# Patient Record
Sex: MALE | Race: WHITE | NOT HISPANIC OR LATINO | URBAN - METROPOLITAN AREA
[De-identification: names, ages, dates, MRNs, and addresses within clinical notes are randomized per-mention and may not be internally consistent; named-entity substitution may affect disease eponyms.]

---

## 2023-09-06 ENCOUNTER — OFFICE (OUTPATIENT)
Dept: URBAN - METROPOLITAN AREA CLINIC 18 | Facility: CLINIC | Age: 68
End: 2023-09-06

## 2023-09-06 ENCOUNTER — HOSPITAL ENCOUNTER (OUTPATIENT)
Dept: DATA CONVERSION | Facility: HOSPITAL | Age: 68
Discharge: HOME | End: 2023-09-07
Payer: MEDICAID

## 2023-09-06 VITALS
HEART RATE: 44 BPM | SYSTOLIC BLOOD PRESSURE: 134 MMHG | HEIGHT: 72 IN | WEIGHT: 315 LBS | DIASTOLIC BLOOD PRESSURE: 86 MMHG

## 2023-09-06 DIAGNOSIS — K80.20 CALCULUS OF GALLBLADDER WITHOUT CHOLECYSTITIS WITHOUT OBSTRU: ICD-10-CM

## 2023-09-06 DIAGNOSIS — M10.9 GOUT, UNSPECIFIED: ICD-10-CM

## 2023-09-06 DIAGNOSIS — Z87.891 PERSONAL HISTORY OF NICOTINE DEPENDENCE: ICD-10-CM

## 2023-09-06 DIAGNOSIS — L03.115 CELLULITIS OF RIGHT LOWER LIMB: ICD-10-CM

## 2023-09-06 DIAGNOSIS — R60.0 LOCALIZED EDEMA: ICD-10-CM

## 2023-09-06 DIAGNOSIS — K83.8 OTHER SPECIFIED DISEASES OF BILIARY TRACT: ICD-10-CM

## 2023-09-06 DIAGNOSIS — E80.6 OTHER DISORDERS OF BILIRUBIN METABOLISM: ICD-10-CM

## 2023-09-06 DIAGNOSIS — Z79.899 OTHER LONG TERM (CURRENT) DRUG THERAPY: ICD-10-CM

## 2023-09-06 DIAGNOSIS — E66.01 MORBID (SEVERE) OBESITY DUE TO EXCESS CALORIES: ICD-10-CM

## 2023-09-06 LAB
ALBUMIN SERPL-MCNC: 3.8 GM/DL (ref 3.5–5)
ALBUMIN/GLOB SERPL: 1.1 RATIO (ref 1.5–3)
ALP BLD-CCNC: 105 U/L (ref 35–125)
ALT SERPL-CCNC: 31 U/L (ref 5–40)
ANION GAP SERPL CALCULATED.3IONS-SCNC: 12 MMOL/L (ref 0–19)
AST SERPL-CCNC: 48 U/L (ref 5–40)
BACTERIA UR QL AUTO: NEGATIVE
BASOPHILS # BLD AUTO: 0.04 K/UL (ref 0–0.22)
BASOPHILS NFR BLD AUTO: 0.4 % (ref 0–1)
BILIRUB SERPL-MCNC: 2.2 MG/DL (ref 0.1–1.2)
BILIRUB UR QL STRIP.AUTO: NEGATIVE
BLOOD CULTURE RESULTS -LH SQ DATA CONVERSION: NORMAL
BLOOD CULTURE RESULTS -LH SQ DATA CONVERSION: NORMAL
BUN SERPL-MCNC: 12 MG/DL (ref 8–25)
BUN/CREAT SERPL: 13.3 RATIO (ref 8–21)
CALCIUM SERPL-MCNC: 9 MG/DL (ref 8.5–10.4)
CHLORIDE SERPL-SCNC: 99 MMOL/L (ref 97–107)
CLARITY UR: ABNORMAL
CO2 SERPL-SCNC: 22 MMOL/L (ref 24–31)
COLOR UR: YELLOW
CREAT SERPL-MCNC: 0.9 MG/DL (ref 0.4–1.6)
CRP SERPL-MCNC: 6.4 MG/DL (ref 0–2)
DEPRECATED RDW RBC AUTO: 57.7 FL (ref 37–54)
DIFFERENTIAL METHOD BLD: ABNORMAL
EOSINOPHIL # BLD AUTO: 0.01 K/UL (ref 0–0.45)
EOSINOPHIL NFR BLD: 0.1 % (ref 0–3)
ERYTHROCYTE [DISTWIDTH] IN BLOOD BY AUTOMATED COUNT: 14.2 % (ref 11.7–15)
ERYTHROCYTE [SEDIMENTATION RATE] IN BLOOD BY WESTERGREN METHOD: 25 MM/HR (ref 0–20)
GFR SERPL CREATININE-BSD FRML MDRD: 94 ML/MIN/1.73 M2
GLOBULIN SER-MCNC: 3.5 G/DL (ref 1.9–3.7)
GLUCOSE SERPL-MCNC: 133 MG/DL (ref 65–99)
GLUCOSE UR STRIP.AUTO-MCNC: NEGATIVE MG/DL
HCT VFR BLD AUTO: 35.9 % (ref 41–50)
HGB BLD-MCNC: 12.7 GM/DL (ref 13.5–16.5)
HGB UR QL STRIP.AUTO: 2 /HPF (ref 0–3)
HGB UR QL: NEGATIVE
HYALINE CASTS UR QL AUTO: 3 /LPF
IMM GRANULOCYTES # BLD AUTO: 0.04 K/UL (ref 0–0.1)
KETONES UR QL STRIP.AUTO: NEGATIVE
LACTATE BLDV-SCNC: 2.5 MMOL/L (ref 0.4–2)
LACTATE BLDV-SCNC: 3.6 MMOL/L (ref 0.4–2)
LEUKOCYTE ESTERASE UR QL STRIP.AUTO: NEGATIVE
LYMPHOCYTES # BLD AUTO: 0.88 K/UL (ref 1.2–3.2)
LYMPHOCYTES NFR BLD MANUAL: 9.6 % (ref 20–40)
MCH RBC QN AUTO: 38.7 PG (ref 26–34)
MCHC RBC AUTO-ENTMCNC: 35.4 % (ref 31–37)
MCV RBC AUTO: 109.5 FL (ref 80–100)
MICROSCOPIC (UA): ABNORMAL
MONOCYTES # BLD AUTO: 1.29 K/UL (ref 0–0.8)
MONOCYTES NFR BLD MANUAL: 14 % (ref 0–8)
NEUTROPHILS # BLD AUTO: 6.94 K/UL
NEUTROPHILS # BLD AUTO: 6.94 K/UL (ref 1.8–7.7)
NEUTROPHILS.IMMATURE NFR BLD: 0.4 % (ref 0–1)
NEUTS SEG NFR BLD: 75.5 % (ref 50–70)
NITRITE UR QL STRIP.AUTO: NEGATIVE
NRBC BLD-RTO: 0 /100 WBC
PH UR STRIP.AUTO: 5.5 [PH] (ref 4.6–8)
PLATELET # BLD AUTO: 200 K/UL (ref 150–450)
PMV BLD AUTO: 9.4 CU (ref 7–12.6)
POTASSIUM SERPL-SCNC: 3.7 MMOL/L (ref 3.4–5.1)
PROT SERPL-MCNC: 7.3 G/DL (ref 5.9–7.9)
PROT UR STRIP.AUTO-MCNC: ABNORMAL MG/DL
RBC # BLD AUTO: 3.28 M/UL (ref 4.5–5.5)
SODIUM SERPL-SCNC: 133 MMOL/L (ref 133–145)
SOURCE,MICRO -LH SQ DATA CONVERSION: NORMAL
SOURCE,MICRO -LH SQ DATA CONVERSION: NORMAL
SP GR UR STRIP.AUTO: 1.03 (ref 1–1.03)
SQUAMOUS UR QL AUTO: ABNORMAL /HPF
URATE SERPL-MCNC: 8.1 MG/DL (ref 3.6–7.7)
URINE CULTURE: ABNORMAL
UROBILINOGEN UR QL STRIP.AUTO: NORMAL MG/DL (ref 0–1)
WBC # BLD AUTO: 9.2 K/UL (ref 4.5–11)
WBC #/AREA URNS AUTO: 2 /HPF (ref 0–3)

## 2023-09-06 PROCEDURE — 99204 OFFICE O/P NEW MOD 45 MIN: CPT | Performed by: INTERNAL MEDICINE

## 2023-09-07 LAB
ALBUMIN SERPL-MCNC: 2.9 GM/DL (ref 3.5–5)
ALBUMIN/GLOB SERPL: 1 RATIO (ref 1.5–3)
ALP BLD-CCNC: 86 U/L (ref 35–125)
ALT SERPL-CCNC: 20 U/L (ref 5–40)
ANION GAP SERPL CALCULATED.3IONS-SCNC: 11 MMOL/L (ref 0–19)
ANISOCYTOSIS BLD QL SMEAR: ABNORMAL
AST SERPL-CCNC: 29 U/L (ref 5–40)
BASOPHILS # BLD AUTO: 0 K/UL (ref 0–0.22)
BASOPHILS NFR BLD AUTO: 0 % (ref 0–1)
BILIRUB SERPL-MCNC: 0.9 MG/DL (ref 0.1–1.2)
BUN SERPL-MCNC: 14 MG/DL (ref 8–25)
BUN/CREAT SERPL: 17.5 RATIO (ref 8–21)
CALCIUM SERPL-MCNC: 8.3 MG/DL (ref 8.5–10.4)
CHLORIDE SERPL-SCNC: 105 MMOL/L (ref 97–107)
CO2 SERPL-SCNC: 21 MMOL/L (ref 24–31)
CREAT SERPL-MCNC: 0.8 MG/DL (ref 0.4–1.6)
DACRYOCYTES BLD QL SMEAR: ABNORMAL
DEPRECATED RDW RBC AUTO: 56.8 FL (ref 37–54)
DIFFERENTIAL METHOD BLD: ABNORMAL
EOSINOPHIL # BLD AUTO: 0 K/UL (ref 0–0.45)
EOSINOPHIL NFR BLD: 0 % (ref 0–3)
ERYTHROCYTE [DISTWIDTH] IN BLOOD BY AUTOMATED COUNT: 13.7 % (ref 11.7–15)
GFR SERPL CREATININE-BSD FRML MDRD: 98 ML/MIN/1.73 M2
GLOBULIN SER-MCNC: 3 G/DL (ref 1.9–3.7)
GLUCOSE SERPL-MCNC: 186 MG/DL (ref 65–99)
HCT VFR BLD AUTO: 31.5 % (ref 41–50)
HGB BLD-MCNC: 10.6 GM/DL (ref 13.5–16.5)
IMM GRANULOCYTES # BLD AUTO: 0.05 K/UL (ref 0–0.1)
LYMPHOCYTES # BLD AUTO: 0.6 K/UL (ref 1.2–3.2)
LYMPHOCYTES NFR BLD MANUAL: 6.7 % (ref 20–40)
MACROCYTES BLD QL SMEAR: ABNORMAL
MAGNESIUM SERPL-MCNC: 2.4 MG/DL (ref 1.6–3.1)
MCH RBC QN AUTO: 37.9 PG (ref 26–34)
MCHC RBC AUTO-ENTMCNC: 33.7 % (ref 31–37)
MCV RBC AUTO: 112.5 FL (ref 80–100)
MONOCYTES # BLD AUTO: 0.34 K/UL (ref 0–0.8)
MONOCYTES NFR BLD MANUAL: 3.8 % (ref 0–8)
NEUTROPHILS # BLD AUTO: 7.97 K/UL
NEUTROPHILS # BLD AUTO: 7.97 K/UL (ref 1.8–7.7)
NEUTROPHILS.IMMATURE NFR BLD: 0.6 % (ref 0–1)
NEUTS SEG NFR BLD: 88.9 % (ref 50–70)
NRBC BLD-RTO: 0 /100 WBC
PHOSPHATE SERPL-MCNC: 1.8 MG/DL (ref 2.5–4.5)
PLATELET # BLD AUTO: 159 K/UL (ref 150–450)
PLATELET BLD QL SMEAR: ABNORMAL
PMV BLD AUTO: 10.3 CU (ref 7–12.6)
POIKILOCYTOSIS BLD QL SMEAR: ABNORMAL
POLYCHROMASIA BLD QL SMEAR: ABNORMAL
POTASSIUM SERPL-SCNC: 3.9 MMOL/L (ref 3.4–5.1)
PROT SERPL-MCNC: 5.9 G/DL (ref 5.9–7.9)
RBC # BLD AUTO: 2.8 M/UL (ref 4.5–5.5)
RBC MORPH BLD: ABNORMAL
SODIUM SERPL-SCNC: 137 MMOL/L (ref 133–145)
URATE SERPL-MCNC: 4.8 MG/DL (ref 3.6–7.7)
WBC # BLD AUTO: 9 K/UL (ref 4.5–11)
WBC MORPH BLD: ABNORMAL

## 2023-10-11 ENCOUNTER — APPOINTMENT (OUTPATIENT)
Dept: RADIOLOGY | Facility: HOSPITAL | Age: 68
End: 2023-10-11
Payer: MEDICAID

## 2023-10-11 ENCOUNTER — HOSPITAL ENCOUNTER (EMERGENCY)
Facility: HOSPITAL | Age: 68
Discharge: HOME | End: 2023-10-11
Attending: EMERGENCY MEDICINE
Payer: MEDICAID

## 2023-10-11 VITALS
WEIGHT: 170 LBS | BODY MASS INDEX: 25.18 KG/M2 | HEART RATE: 88 BPM | TEMPERATURE: 99.3 F | SYSTOLIC BLOOD PRESSURE: 118 MMHG | RESPIRATION RATE: 16 BRPM | HEIGHT: 69 IN | DIASTOLIC BLOOD PRESSURE: 78 MMHG | OXYGEN SATURATION: 98 %

## 2023-10-11 DIAGNOSIS — M10.9 ACUTE GOUT OF RIGHT FOOT, UNSPECIFIED CAUSE: Primary | ICD-10-CM

## 2023-10-11 LAB
ANION GAP SERPL CALC-SCNC: 12 MMOL/L
BASOPHILS # BLD AUTO: 0.04 X10*3/UL (ref 0–0.1)
BASOPHILS NFR BLD AUTO: 0.5 %
BUN SERPL-MCNC: 11 MG/DL (ref 8–25)
CALCIUM SERPL-MCNC: 9.2 MG/DL (ref 8.5–10.4)
CHLORIDE SERPL-SCNC: 101 MMOL/L (ref 97–107)
CO2 SERPL-SCNC: 24 MMOL/L (ref 24–31)
CREAT SERPL-MCNC: 0.9 MG/DL (ref 0.4–1.6)
EOSINOPHIL # BLD AUTO: 0.1 X10*3/UL (ref 0–0.7)
EOSINOPHIL NFR BLD AUTO: 1.2 %
ERYTHROCYTE [DISTWIDTH] IN BLOOD BY AUTOMATED COUNT: 12.6 % (ref 11.5–14.5)
GFR SERPL CREATININE-BSD FRML MDRD: >90 ML/MIN/1.73M*2
GLUCOSE SERPL-MCNC: 100 MG/DL (ref 65–99)
HCT VFR BLD AUTO: 34 % (ref 41–52)
HGB BLD-MCNC: 11.4 G/DL (ref 13.5–17.5)
IMM GRANULOCYTES # BLD AUTO: 0.03 X10*3/UL (ref 0–0.7)
IMM GRANULOCYTES NFR BLD AUTO: 0.4 % (ref 0–0.9)
LYMPHOCYTES # BLD AUTO: 0.97 X10*3/UL (ref 1.2–4.8)
LYMPHOCYTES NFR BLD AUTO: 11.8 %
MCH RBC QN AUTO: 36.8 PG (ref 26–34)
MCHC RBC AUTO-ENTMCNC: 33.5 G/DL (ref 32–36)
MCV RBC AUTO: 110 FL (ref 80–100)
MONOCYTES # BLD AUTO: 0.86 X10*3/UL (ref 0.1–1)
MONOCYTES NFR BLD AUTO: 10.4 %
NEUTROPHILS # BLD AUTO: 6.24 X10*3/UL (ref 1.2–7.7)
NEUTROPHILS NFR BLD AUTO: 75.7 %
NRBC BLD-RTO: 0 /100 WBCS (ref 0–0)
PLATELET # BLD AUTO: 219 X10*3/UL (ref 150–450)
PMV BLD AUTO: 8.7 FL (ref 7.5–11.5)
POTASSIUM SERPL-SCNC: 3.7 MMOL/L (ref 3.4–5.1)
RBC # BLD AUTO: 3.1 X10*6/UL (ref 4.5–5.9)
SODIUM SERPL-SCNC: 137 MMOL/L (ref 133–145)
URATE SERPL-MCNC: 3.9 MG/DL (ref 3.6–7.7)
WBC # BLD AUTO: 8.2 X10*3/UL (ref 4.4–11.3)

## 2023-10-11 PROCEDURE — 36415 COLL VENOUS BLD VENIPUNCTURE: CPT | Performed by: EMERGENCY MEDICINE

## 2023-10-11 PROCEDURE — 84550 ASSAY OF BLOOD/URIC ACID: CPT | Performed by: EMERGENCY MEDICINE

## 2023-10-11 PROCEDURE — 73630 X-RAY EXAM OF FOOT: CPT | Mod: RT,FY

## 2023-10-11 PROCEDURE — 80048 BASIC METABOLIC PNL TOTAL CA: CPT | Performed by: EMERGENCY MEDICINE

## 2023-10-11 PROCEDURE — 2500000004 HC RX 250 GENERAL PHARMACY W/ HCPCS (ALT 636 FOR OP/ED): Performed by: EMERGENCY MEDICINE

## 2023-10-11 PROCEDURE — 99283 EMERGENCY DEPT VISIT LOW MDM: CPT

## 2023-10-11 PROCEDURE — 99284 EMERGENCY DEPT VISIT MOD MDM: CPT | Performed by: EMERGENCY MEDICINE

## 2023-10-11 PROCEDURE — 85025 COMPLETE CBC W/AUTO DIFF WBC: CPT | Performed by: EMERGENCY MEDICINE

## 2023-10-11 RX ORDER — PREDNISONE 20 MG/1
40 TABLET ORAL ONCE
Status: COMPLETED | OUTPATIENT
Start: 2023-10-11 | End: 2023-10-11

## 2023-10-11 RX ORDER — PREDNISONE 20 MG/1
40 TABLET ORAL DAILY
Qty: 14 TABLET | Refills: 0 | Status: SHIPPED | OUTPATIENT
Start: 2023-10-11 | End: 2023-10-18

## 2023-10-11 RX ADMIN — PREDNISONE 40 MG: 20 TABLET ORAL at 17:39

## 2023-10-11 ASSESSMENT — PAIN SCALES - GENERAL: PAINLEVEL_OUTOF10: 10 - WORST POSSIBLE PAIN

## 2023-10-11 ASSESSMENT — PAIN - FUNCTIONAL ASSESSMENT: PAIN_FUNCTIONAL_ASSESSMENT: 0-10

## 2023-10-11 NOTE — ED PROVIDER NOTES
"Department of Emergency Medicine   ED  Provider Note  Admit Date/RoomTime: 10/11/2023  5:13 PM  ED Room: ST28/ST28        History of Present Illness:  Chief Complaint   Patient presents with    Foot Pain/Gout    Leg Pain     Pt states \"I have gout attack and lately my foot has been getting more painful and swollen and red. I was at the hospital 6 weeks ago and they put me on allopurinol 100. I went to see my doctor on Monday sept 25th and she bumped me up to 300 twice a day. On Thursday it seemed to be getting worse and more painful. Yesterday I stopped taking the allopurinol because it seems like it is not helping.\" Pt denies CP/SOB/N/V/fever/chills         Jake Banerjee is a 68 y.o. male presenting to the ED for gout attack, beginning a few days ago.  The complaint has been persistent, moderate in severity, and worsened by nothing.  Patient says that he was in the hospital 6 weeks ago for gout attack.  They put him on allopurinol 100 mg.  He went to see his physician on September 25 and the increase allopurinol to 300 mg twice daily.  Patient says over the last couple of days is becoming more painful.  He stopped taking the allopurinol because he did not think it was helping.  He denies any chest pain, shortness of breath, fever, chills, nausea, vomiting, trauma, injury.      Review of Systems:   Pertinent positives and negatives are stated within HPI, all other systems reviewed and are negative.        --------------------------------------------- PAST HISTORY ---------------------------------------------  Past Medical History:  has a past medical history of Foreign body in cornea, left eye, initial encounter (11/02/2017).  Past Surgical History:  has a past surgical history that includes Hernia repair (10/18/2017).  Social History:    Family History: family history is not on file.. Unless otherwise noted, family history is non contributory  The patient’s home medications have been reviewed.  Allergies: Sulfa " "(sulfonamide antibiotics)        ---------------------------------------------------PHYSICAL EXAM--------------------------------------    GENERAL APPEARANCE: Awake and alert.   VITAL SIGNS: As per the nurses' triage record.   HEENT: Normocephalic, atraumatic. Extraocular muscles are intact. Pupils equal round and reactive to light. Conjunctiva are pink. Negative scleral icterus. Mucous membranes are moist. Tongue in the midline. Pharynx was without erythema or exudates, uvula midline  NECK: Soft Nontender and supple, full gross ROM, no meningeal signs.  CHEST: Nontender to palpation. Clear to auscultation bilaterally. No rales, rhonchi, or wheezing.   HEART: S1, S2. Regular rate and rhythm. No murmurs, gallops or rubs.  Strong and equal pulses in the extremities.   ABDOMEN: Soft, nontender, nondistended, positive bowel sounds, no palpable masses.  MUSCULCSKELETAL: The calves are nontender to palpation. Full gross active range of motion. Ambulating on own with no acute difficulties.  Right foot is edematous and erythematous.  Tenderness to palpation over the foot.  2+ dorsalis pedis pulse.  NEUROLOGICAL: Awake, alert and oriented x 3. Power intact in the upper and lower extremities. Sensation is intact to light touch in the upper and lower extremities.   IMMUNOLOGICAL: No lymphatic streaking noted   DERM: No petechiae, rashes, or ecchymoses.          ------------------------- NURSING NOTES AND VITALS REVIEWED ---------------------------  The nursing notes within the ED encounter and vital signs as below have been reviewed by myself  /77 (BP Location: Right arm, Patient Position: Sitting)   Pulse 92   Temp 37.4 °C (99.3 °F) (Oral)   Resp 18   Ht 1.753 m (5' 9\")   Wt 77.1 kg (170 lb)   SpO2 100%   BMI 25.10 kg/m²     Oxygen Saturation Interpretation: Normal      The patient’s available past medical records and past encounters were reviewed.          -----------------------DIAGNOSTIC " RESULTS------------------------  LABS:    Labs Reviewed   CBC WITH AUTO DIFFERENTIAL - Abnormal       Result Value    WBC 8.2      nRBC 0.0      RBC 3.10 (*)     Hemoglobin 11.4 (*)     Hematocrit 34.0 (*)      (*)     MCH 36.8 (*)     MCHC 33.5      RDW 12.6      Platelets 219      MPV 8.7      Neutrophils % 75.7      Immature Granulocytes %, Automated 0.4      Lymphocytes % 11.8      Monocytes % 10.4      Eosinophils % 1.2      Basophils % 0.5      Neutrophils Absolute 6.24      Immature Granulocytes Absolute, Automated 0.03      Lymphocytes Absolute 0.97 (*)     Monocytes Absolute 0.86      Eosinophils Absolute 0.10      Basophils Absolute 0.04     BASIC METABOLIC PANEL - Abnormal    Glucose 100 (*)     Sodium 137      Potassium 3.7      Chloride 101      Bicarbonate 24      Urea Nitrogen 11      Creatinine 0.90      eGFR >90      Calcium 9.2      Anion Gap 12     URIC ACID - Normal    Uric Acid 3.9         As interpreted by me, the above displayed labs are abnormal. All other labs obtained during this visit were within normal range or not returned as of this dictation.        XR foot right 3+ views   Final Result   1. No acute fracture or dislocation.   2. Soft tissue swelling about the 1st IP joint.        MACRO:   None.        Signed by: Jovan Mota 10/11/2023 4:05 PM   Dictation workstation:   [id number]              XR foot right 3+ views   Final Result   1. No acute fracture or dislocation.   2. Soft tissue swelling about the 1st IP joint.        MACRO:   None.        Signed by: Jovan Mota 10/11/2023 4:05 PM   Dictation workstation:   [id number]              ------------------------------ ED COURSE/MEDICAL DECISION MAKING----------------------  Medical Decision Making:   Exam: A medically appropriate exam performed, outlined above, given the known history and presentation.    [facility]    Social Determinants of Health considered during this visit: none    PAST  MEDICAL HISTORY/Chronic Conditions Affecting Care     has a past medical history of Foreign body in cornea, left eye, initial encounter (11/02/2017).     CC/HPI Summary, Social Determinants of health, Records Reviewed, DDx, testing done/not done, ED Course, Reassessment, disposition considerations/shared decision making with patient, consults, disposition, MDM:   Erythematous and edematous right foot for the last 6 weeks.  History of gout.  Has tried colchicine and allopurinol without much relief of symptoms.  Labs obtained and are within normal limits.  X-ray was obtained.  No acute process identified.  I will start the patient on prednisone.  I told him to take the medication as directed, to follow-up with his primary care physician for reevaluation this week, and to return for any worsening symptoms.  Return precautions were discussed.  Patient is agreeable with the shared decision making at this time.    Differential Diagnosis:  Gout, cellulitis, osteomyelitis    PROCEDURES  Unless otherwise noted below, none    CONSULTS:   None    Diagnoses as of 10/11/23 1747   Acute gout of right foot, unspecified cause       This patient has remained hemodynamically stable during their ED course.    Critical Care: None    Counseling:  The emergency provider has spoken with the patient and discussed today’s results, in addition to providing specific details for the plan of care and counseling regarding the diagnosis and prognosis.  Questions are answered at this time and they are agreeable with the plan.         --------------------------------- IMPRESSION AND DISPOSITION ---------------------------------    IMPRESSION  1. Acute gout of right foot, unspecified cause        DISPOSITION  Disposition: Discharge to home  Patient condition is stable        NOTE: This report was transcribed using voice recognition software. Every effort was made to ensure accuracy; however, inadvertent computerized transcription errors may be  present       Francheska Pichardo MD  10/11/23 7953

## 2024-10-11 ENCOUNTER — PHARMACY VISIT (OUTPATIENT)
Dept: PHARMACY | Facility: CLINIC | Age: 69
End: 2024-10-11

## 2024-10-11 ENCOUNTER — HOSPITAL ENCOUNTER (EMERGENCY)
Facility: HOSPITAL | Age: 69
Discharge: HOME | End: 2024-10-11
Attending: STUDENT IN AN ORGANIZED HEALTH CARE EDUCATION/TRAINING PROGRAM
Payer: MEDICAID

## 2024-10-11 VITALS
HEIGHT: 69 IN | HEART RATE: 98 BPM | BODY MASS INDEX: 22.96 KG/M2 | DIASTOLIC BLOOD PRESSURE: 81 MMHG | SYSTOLIC BLOOD PRESSURE: 122 MMHG | TEMPERATURE: 97.7 F | WEIGHT: 155 LBS | OXYGEN SATURATION: 99 % | RESPIRATION RATE: 15 BRPM

## 2024-10-11 DIAGNOSIS — M25.562 ACUTE PAIN OF LEFT KNEE: Primary | ICD-10-CM

## 2024-10-11 DIAGNOSIS — M25.462 KNEE EFFUSION, LEFT: ICD-10-CM

## 2024-10-11 DIAGNOSIS — M10.162 LEAD-INDUCED ACUTE GOUT OF LEFT KNEE, INITIAL ENCOUNTER: ICD-10-CM

## 2024-10-11 DIAGNOSIS — T56.0X1A LEAD-INDUCED ACUTE GOUT OF LEFT KNEE, INITIAL ENCOUNTER: ICD-10-CM

## 2024-10-11 LAB
ALBUMIN SERPL BCP-MCNC: 3.7 G/DL (ref 3.4–5)
ALP SERPL-CCNC: 124 U/L (ref 33–136)
ALT SERPL W P-5'-P-CCNC: 58 U/L (ref 10–52)
ANION GAP SERPL CALCULATED.3IONS-SCNC: 16 MMOL/L (ref 10–20)
AST SERPL W P-5'-P-CCNC: 91 U/L (ref 9–39)
BASOPHILS # BLD AUTO: 0.04 X10*3/UL (ref 0–0.1)
BASOPHILS NFR BLD AUTO: 0.4 %
BASOPHILS NFR FLD MANUAL: 0 %
BILIRUB SERPL-MCNC: 3.5 MG/DL (ref 0–1.2)
BLASTS NFR FLD MANUAL: 0 %
BUN SERPL-MCNC: 13 MG/DL (ref 6–23)
CALCIUM SERPL-MCNC: 8.9 MG/DL (ref 8.6–10.3)
CHLORIDE SERPL-SCNC: 98 MMOL/L (ref 98–107)
CLARITY FLD: ABNORMAL
CO2 SERPL-SCNC: 24 MMOL/L (ref 21–32)
COLOR FLD: YELLOW
CREAT SERPL-MCNC: 0.85 MG/DL (ref 0.5–1.3)
CRYSTALS FLD MICRO: ABNORMAL
EGFRCR SERPLBLD CKD-EPI 2021: >90 ML/MIN/1.73M*2
EOSINOPHIL # BLD AUTO: 0.03 X10*3/UL (ref 0–0.7)
EOSINOPHIL NFR BLD AUTO: 0.3 %
EOSINOPHIL NFR FLD MANUAL: 0 %
ERYTHROCYTE [DISTWIDTH] IN BLOOD BY AUTOMATED COUNT: 13.5 % (ref 11.5–14.5)
GLUCOSE SERPL-MCNC: 133 MG/DL (ref 74–99)
HCT VFR BLD AUTO: 32.7 % (ref 41–52)
HGB BLD-MCNC: 11.4 G/DL (ref 13.5–17.5)
IMM GRANULOCYTES # BLD AUTO: 0.04 X10*3/UL (ref 0–0.7)
IMM GRANULOCYTES NFR BLD AUTO: 0.4 % (ref 0–0.9)
IMMATURE GRANULOCYTES IN FLUID: 0 %
LYMPHOCYTES # BLD AUTO: 1.01 X10*3/UL (ref 1.2–4.8)
LYMPHOCYTES NFR BLD AUTO: 10.3 %
LYMPHOCYTES NFR FLD MANUAL: 3 %
MAGNESIUM SERPL-MCNC: 1.99 MG/DL (ref 1.6–2.4)
MCH RBC QN AUTO: 38.3 PG (ref 26–34)
MCHC RBC AUTO-ENTMCNC: 34.9 G/DL (ref 32–36)
MCV RBC AUTO: 110 FL (ref 80–100)
MONOCYTES # BLD AUTO: 1.66 X10*3/UL (ref 0.1–1)
MONOCYTES NFR BLD AUTO: 17 %
MONOS+MACROS NFR FLD MANUAL: 3 %
NEUTROPHILS # BLD AUTO: 7 X10*3/UL (ref 1.2–7.7)
NEUTROPHILS NFR BLD AUTO: 71.6 %
NEUTROPHILS NFR FLD MANUAL: 94 %
NRBC BLD-RTO: 0 /100 WBCS (ref 0–0)
OTHER CELLS NFR FLD MANUAL: 0 %
PLASMA CELLS NFR FLD MANUAL: 0 %
PLATELET # BLD AUTO: 113 X10*3/UL (ref 150–450)
POTASSIUM SERPL-SCNC: 3.8 MMOL/L (ref 3.5–5.3)
PROT SERPL-MCNC: 7.3 G/DL (ref 6.4–8.2)
RBC # BLD AUTO: 2.98 X10*6/UL (ref 4.5–5.9)
RBC # FLD AUTO: 4000 /UL
RBC MORPH BLD: NORMAL
SODIUM SERPL-SCNC: 134 MMOL/L (ref 136–145)
TOTAL CELLS COUNTED FLD: 100
URATE SERPL-MCNC: 7.7 MG/DL (ref 4–7.5)
WBC # BLD AUTO: 9.8 X10*3/UL (ref 4.4–11.3)
WBC # FLD AUTO: ABNORMAL /UL

## 2024-10-11 PROCEDURE — 89060 EXAM SYNOVIAL FLUID CRYSTALS: CPT | Mod: TRILAB,WESLAB | Performed by: STUDENT IN AN ORGANIZED HEALTH CARE EDUCATION/TRAINING PROGRAM

## 2024-10-11 PROCEDURE — 2500000001 HC RX 250 WO HCPCS SELF ADMINISTERED DRUGS (ALT 637 FOR MEDICARE OP): Performed by: STUDENT IN AN ORGANIZED HEALTH CARE EDUCATION/TRAINING PROGRAM

## 2024-10-11 PROCEDURE — 2500000004 HC RX 250 GENERAL PHARMACY W/ HCPCS (ALT 636 FOR OP/ED): Performed by: STUDENT IN AN ORGANIZED HEALTH CARE EDUCATION/TRAINING PROGRAM

## 2024-10-11 PROCEDURE — 83735 ASSAY OF MAGNESIUM: CPT | Performed by: STUDENT IN AN ORGANIZED HEALTH CARE EDUCATION/TRAINING PROGRAM

## 2024-10-11 PROCEDURE — RXMED WILLOW AMBULATORY MEDICATION CHARGE

## 2024-10-11 PROCEDURE — 99283 EMERGENCY DEPT VISIT LOW MDM: CPT | Mod: 25

## 2024-10-11 PROCEDURE — 36415 COLL VENOUS BLD VENIPUNCTURE: CPT | Performed by: STUDENT IN AN ORGANIZED HEALTH CARE EDUCATION/TRAINING PROGRAM

## 2024-10-11 PROCEDURE — 84550 ASSAY OF BLOOD/URIC ACID: CPT | Performed by: STUDENT IN AN ORGANIZED HEALTH CARE EDUCATION/TRAINING PROGRAM

## 2024-10-11 PROCEDURE — 20610 DRAIN/INJ JOINT/BURSA W/O US: CPT | Mod: LT | Performed by: STUDENT IN AN ORGANIZED HEALTH CARE EDUCATION/TRAINING PROGRAM

## 2024-10-11 PROCEDURE — 89051 BODY FLUID CELL COUNT: CPT | Performed by: STUDENT IN AN ORGANIZED HEALTH CARE EDUCATION/TRAINING PROGRAM

## 2024-10-11 PROCEDURE — 80053 COMPREHEN METABOLIC PANEL: CPT | Performed by: STUDENT IN AN ORGANIZED HEALTH CARE EDUCATION/TRAINING PROGRAM

## 2024-10-11 PROCEDURE — 87205 SMEAR GRAM STAIN: CPT | Mod: TRILAB,WESLAB | Performed by: STUDENT IN AN ORGANIZED HEALTH CARE EDUCATION/TRAINING PROGRAM

## 2024-10-11 PROCEDURE — 85025 COMPLETE CBC W/AUTO DIFF WBC: CPT | Performed by: STUDENT IN AN ORGANIZED HEALTH CARE EDUCATION/TRAINING PROGRAM

## 2024-10-11 RX ORDER — NAPROXEN 250 MG/1
500 TABLET ORAL ONCE
Status: DISCONTINUED | OUTPATIENT
Start: 2024-10-11 | End: 2024-10-11 | Stop reason: HOSPADM

## 2024-10-11 RX ORDER — COLCHICINE 0.6 MG/1
0.6 TABLET ORAL DAILY
Status: DISCONTINUED | OUTPATIENT
Start: 2024-10-11 | End: 2024-10-11 | Stop reason: HOSPADM

## 2024-10-11 RX ORDER — COLCHICINE 0.6 MG/1
0.6 TABLET ORAL 2 TIMES DAILY
Qty: 20 TABLET | Refills: 0 | Status: SHIPPED | OUTPATIENT
Start: 2024-10-11 | End: 2024-10-21

## 2024-10-11 RX ORDER — OXYCODONE HYDROCHLORIDE 5 MG/1
5 TABLET ORAL ONCE
Status: COMPLETED | OUTPATIENT
Start: 2024-10-11 | End: 2024-10-11

## 2024-10-11 RX ORDER — COLCHICINE 0.6 MG/1
1.2 TABLET ORAL DAILY
Status: DISCONTINUED | OUTPATIENT
Start: 2024-10-11 | End: 2024-10-11

## 2024-10-11 RX ORDER — PREDNISONE 20 MG/1
40 TABLET ORAL DAILY
Qty: 20 TABLET | Refills: 0 | Status: SHIPPED | OUTPATIENT
Start: 2024-10-11 | End: 2024-10-21

## 2024-10-11 RX ORDER — NAPROXEN 500 MG/1
500 TABLET ORAL
Qty: 30 TABLET | Refills: 0 | Status: SHIPPED | OUTPATIENT
Start: 2024-10-11 | End: 2024-10-26

## 2024-10-11 RX ORDER — COLCHICINE 0.6 MG/1
1.2 TABLET ORAL ONCE
Status: DISCONTINUED | OUTPATIENT
Start: 2024-10-11 | End: 2024-10-11 | Stop reason: HOSPADM

## 2024-10-11 RX ORDER — PREDNISONE 20 MG/1
40 TABLET ORAL ONCE
Status: COMPLETED | OUTPATIENT
Start: 2024-10-11 | End: 2024-10-11

## 2024-10-11 RX ORDER — LIDOCAINE HYDROCHLORIDE AND EPINEPHRINE 10; 10 MG/ML; UG/ML
10 INJECTION, SOLUTION INFILTRATION; PERINEURAL ONCE
Status: COMPLETED | OUTPATIENT
Start: 2024-10-11 | End: 2024-10-11

## 2024-10-11 ASSESSMENT — PAIN DESCRIPTION - ORIENTATION: ORIENTATION: LEFT

## 2024-10-11 ASSESSMENT — COLUMBIA-SUICIDE SEVERITY RATING SCALE - C-SSRS
6. HAVE YOU EVER DONE ANYTHING, STARTED TO DO ANYTHING, OR PREPARED TO DO ANYTHING TO END YOUR LIFE?: NO
2. HAVE YOU ACTUALLY HAD ANY THOUGHTS OF KILLING YOURSELF?: NO
1. IN THE PAST MONTH, HAVE YOU WISHED YOU WERE DEAD OR WISHED YOU COULD GO TO SLEEP AND NOT WAKE UP?: NO

## 2024-10-11 ASSESSMENT — PAIN - FUNCTIONAL ASSESSMENT
PAIN_FUNCTIONAL_ASSESSMENT: 0-10
PAIN_FUNCTIONAL_ASSESSMENT: 0-10

## 2024-10-11 ASSESSMENT — PAIN SCALES - GENERAL
PAINLEVEL_OUTOF10: 8
PAINLEVEL_OUTOF10: 10 - WORST POSSIBLE PAIN
PAINLEVEL_OUTOF10: 2

## 2024-10-11 ASSESSMENT — PAIN DESCRIPTION - LOCATION: LOCATION: KNEE

## 2024-10-11 NOTE — DISCHARGE INSTRUCTIONS
You were evaluated for left knee/ankle pain and swelling with difficulty ambulating.     During your visit in the emergency department you were evaluated for your presenting symptoms.  Based on the extensive workup you received,  all efforts were made to identify the source of your symptoms and identify any life-threatening conditions.  These life-threatening conditions that were attempted to be identified and medically managed/treated include but not limited to fracture/dislocation, infectious/septic arthritis, deep vein thrombosis, cellulitis.  Additionally, you may be experiencing symptoms that are non-life-threatening but are uncomfortable and disruptive to your everyday life.  All efforts were made to manage your symptoms while in the emergency department along with appropriate at home therapy for symptomatic management during your recovery. Please be aware that not all of the conditions explained/discussed in these discharge instructions are applicable to your condition but encompass many of the conditions that were evaluated for during your ED encounter.      During your evaluation and assessment, all measures were taken to evaluate you and address your health concerns to identify dangerous and life threatening health conditions. It is not possible to identify all health conditions or pathologies and eliminate any chance of unfavorable outcomes while in the Emergency Department. My team encourages you to be vigilant with your health and follow-up with the appropriate providers outlined in your discharge paperwork. Please return to the Emergency Department if you feel that your health has not improved or experiencing worsening symptoms.    Special instructions please take the medication as prescribed.  Please make a follow-up with your primary care physician to further manage symptoms and address your health concerns.  Please decrease the amount of colchicine you are taking if you start experience significant  diarrhea.    Incidental findings:  None

## 2024-10-11 NOTE — ED PROVIDER NOTES
"HPI   Chief Complaint   Patient presents with    Knee Pain     Pt bib ems from home for left knee pain, no fall or recent injury, ems states he has a hx gout and think that is what is causing it, pain started yesterday and had right knee pain last week but it subsided, no other complaints at this time.       Patient presents to the ED for left knee and left ankle pain.  Notes history of gout.  States been experiencing worsening symptoms over the last few days initially started his ankle extending up to his knee.  Denies any recent falls or injuries.  Has not experienced any significant fever/chills.  Notes no abnormal redness or warmth to his left knee/ankle.  Notes moderate joint swelling and inability to completely extend/flex joint secondary to pressure.  States he takes allopurinol.  Denies any other significant systemic symptoms or complaints.              Patient History   Past Medical History:   Diagnosis Date    Foreign body in cornea, left eye, initial encounter 11/02/2017    Acute foreign body of left cornea     Past Surgical History:   Procedure Laterality Date    HERNIA REPAIR  10/18/2017    Hernia Repair     No family history on file.  Social History     Tobacco Use    Smoking status: Not on file    Smokeless tobacco: Not on file   Substance Use Topics    Alcohol use: Not on file    Drug use: Not on file       Physical Exam   /81   Pulse 98   Temp 36.5 °C (97.7 °F) (Temporal)   Resp 15   Ht 1.753 m (5' 9\")   Wt 70.3 kg (155 lb)   SpO2 99%   BMI 22.89 kg/m²       Physical Exam  Vitals and nursing note reviewed.   Constitutional:       General: He is not in acute distress.     Appearance: Normal appearance.   Cardiovascular:      Rate and Rhythm: Normal rate.   Pulmonary:      Effort: Pulmonary effort is normal.   Musculoskeletal:         General: No swelling, deformity or signs of injury.      Left knee: Swelling and effusion present. No deformity, erythema, ecchymosis, bony tenderness or " crepitus. Decreased range of motion. Tenderness present.      Right ankle: Swelling present.      Left ankle: Swelling present. No deformity, ecchymosis or lacerations. No tenderness. Normal range of motion. Anterior drawer test negative. Normal pulse.      Comments: Moderate joint swelling with effusion of left knee, no abnormal warmth or overlying erythema, incomplete full extension/flexion secondary to pressure and discomfort, no appreciable abnormal alignment, patella is midline, no obvious trauma, remaining extremities neurovasc intact, equal and symmetrical swelling of distal ankles without any significant erythema and no significant pain with palpation, no gross forms or injuries   Skin:     General: Skin is warm and dry.      Capillary Refill: Capillary refill takes less than 2 seconds.   Neurological:      General: No focal deficit present.      Mental Status: He is alert and oriented to person, place, and time.      Sensory: No sensory deficit.      Motor: No weakness.           ED Course & MDM   ED Course as of 10/11/24 2048   Fri Oct 11, 2024   0825 VSS on presentation in setting of reported left knee and left ankle swelling and pain [BC]   0936 Uric acid(!)  Mildly elevated above upper limit concerning for early gout flare in the setting of left knee pain and swelling [BC]   0936 Comprehensive metabolic panel(!)  Unremarkable and noncontributory to Patient condition/symptoms [BC]   0936 Magnesium  WNL [BC]   1040 CBC and Auto Differential(!)  Unremarkable and noncontributory to Patient condition/symptoms [BC]   1212 Body Fluid Cell Count With Differential(!) [BC]      ED Course User Index  [BC] Gus Olivas MD         Diagnoses as of 10/11/24 2048   Acute pain of left knee   Knee effusion, left   Lead-induced acute gout of left knee, initial encounter                 No data recorded     [place] Coma Scale Score: 15 (10/11/24 0802 : Drew Quiroz RN)                           Medical Decision  Making  Patient presented to the ED for left knee/left ankle pain for the last few days with worsening swelling of left knee and ankle with concerning PMHx of gout.  I personally reviewed and interpreted VS and labs which are as stated above in the ED course.    Assessment/evaluation consistent with likely inflammatory/gout flare versus noninflammatory tibiofemoral joint effusion.  No concerning history, clinical evidence/work-up, or exam findings for the considered differentials of low suspicion infectious/septic arthritis, fracture/dislocation, DVT.  These conditions have been thoroughly evaluated and determined to be sufficiently unlikely to be the etiology of patient's presenting symptoms.    Prescribed naproxen for symptomatic management, and prednisone for appropriate treatment.  After receiving an appropriate exam, clinical work-up, and necessary interventions/treatment, Patient is appropriate for discharge at this time due to no concerning symptoms or findings requiring hospitalization for stabilization or further interrogation/management and is appropriate for management of symptoms at home with recommended appropriate outpatient follow-up.  Patient was encouraged to ask any questions or for clarification of today's ED encounter. Discussed with Patient today's results/findings and likely diagnosis, provided appropriate RTED precautions along with recommended follow-up with PCP and Orthopedist/Orthopedics        Per Chart Review: Primary care office visit on 7/29/2024 at [facility] for evaluation of vit B12 deficiency and gout, this summary significant for reported frequent gout flares managed with colchicine twice daily, currently not experiencing any joint pain or swelling, exam unremarkable/noncontributory, recommend follow-up with gastroenterology, obtain future labs, RTO 1 month.      Parts of this chart have been completed using voice-to-text recognition software. Please excuse any errors  of transcription that were missed for editing/correcting.    Amount and/or Complexity of Data Reviewed  External Data Reviewed: notes.     Details: See MDM  Labs: ordered. Decision-making details documented in ED Course.        Procedure  Arthrocentesis    Performed by: Gus Olivas MD  Authorized by: Gus Olivas MD    Consent:     Consent obtained:  Verbal and written    Consent given by:  Patient    Risks, benefits, and alternatives were discussed: yes      Risks discussed:  Bleeding, infection, pain and incomplete drainage    Alternatives discussed:  Alternative treatment  Universal protocol:     Procedure explained and questions answered to patient or proxy's satisfaction: yes      Relevant documents present and verified: no      Test results available: yes      Imaging studies available: no      Required blood products, implants, devices, and special equipment available: yes      Site/side marked: no      Immediately prior to procedure, a time out was called: yes      Patient identity confirmed:  Verbally with patient, arm band and hospital-assigned identification number  Location:     Location:  Knee    Knee:  L knee  Anesthesia:     Anesthesia method:  Local infiltration  Procedure details:     Needle gauge:  18 G    Ultrasound guidance: no      Approach:  Medial    Aspirate amount:  25 cc    Aspirate characteristics:  Yellow    Steroid injected: no      Specimen collected: yes    Post-procedure details:     Dressing:  Adhesive bandage    Procedure completion:  Tolerated       Gus Olivas MD  10/11/24 1158       Gus Olivas MD  10/11/24 2042

## 2024-10-13 LAB
BACTERIA FLD CULT: NORMAL
GRAM STN SPEC: NORMAL
GRAM STN SPEC: NORMAL

## 2024-10-14 LAB
BACTERIA FLD CULT: NORMAL
GRAM STN SPEC: NORMAL
GRAM STN SPEC: NORMAL
PATH REVIEW-CRYSTALS: NORMAL

## 2025-02-18 ENCOUNTER — APPOINTMENT (OUTPATIENT)
Dept: RADIOLOGY | Facility: HOSPITAL | Age: 70
End: 2025-02-18
Payer: MEDICAID

## 2025-02-18 ENCOUNTER — HOSPITAL ENCOUNTER (INPATIENT)
Facility: HOSPITAL | Age: 70
End: 2025-02-18
Attending: STUDENT IN AN ORGANIZED HEALTH CARE EDUCATION/TRAINING PROGRAM | Admitting: INTERNAL MEDICINE
Payer: MEDICAID

## 2025-02-18 DIAGNOSIS — M79.604 PAIN IN BOTH LOWER EXTREMITIES: ICD-10-CM

## 2025-02-18 DIAGNOSIS — M10.09 ACUTE IDIOPATHIC GOUT OF MULTIPLE SITES: Primary | ICD-10-CM

## 2025-02-18 DIAGNOSIS — R60.9 SWELLING: ICD-10-CM

## 2025-02-18 DIAGNOSIS — M79.605 PAIN IN BOTH LOWER EXTREMITIES: ICD-10-CM

## 2025-02-18 PROBLEM — L02.416 CELLULITIS AND ABSCESS OF LEFT LOWER EXTREMITY: Status: ACTIVE | Noted: 2025-02-18

## 2025-02-18 PROBLEM — F10.10 ALCOHOL ABUSE: Status: ACTIVE | Noted: 2025-02-18

## 2025-02-18 PROBLEM — R26.2 AMBULATORY DYSFUNCTION: Status: ACTIVE | Noted: 2025-02-18

## 2025-02-18 PROBLEM — L03.116 CELLULITIS AND ABSCESS OF LEFT LOWER EXTREMITY: Status: ACTIVE | Noted: 2025-02-18

## 2025-02-18 LAB
ABO GROUP (TYPE) IN BLOOD: NORMAL
ABO GROUP (TYPE) IN BLOOD: NORMAL
ALBUMIN SERPL BCP-MCNC: 3.8 G/DL (ref 3.4–5)
ALP SERPL-CCNC: 92 U/L (ref 33–136)
ALT SERPL W P-5'-P-CCNC: 23 U/L (ref 10–52)
ANION GAP SERPL CALCULATED.3IONS-SCNC: 12 MMOL/L (ref 10–20)
ANTIBODY SCREEN: NORMAL
AST SERPL W P-5'-P-CCNC: 37 U/L (ref 9–39)
BASOPHILS # BLD AUTO: 0.03 X10*3/UL (ref 0–0.1)
BASOPHILS NFR BLD AUTO: 0.3 %
BILIRUB DIRECT SERPL-MCNC: 1.2 MG/DL (ref 0–0.3)
BILIRUB SERPL-MCNC: 4.4 MG/DL (ref 0–1.2)
BUN SERPL-MCNC: 16 MG/DL (ref 6–23)
CALCIUM SERPL-MCNC: 9.1 MG/DL (ref 8.6–10.3)
CHLORIDE SERPL-SCNC: 102 MMOL/L (ref 98–107)
CO2 SERPL-SCNC: 25 MMOL/L (ref 21–32)
CREAT SERPL-MCNC: 0.8 MG/DL (ref 0.5–1.3)
CRP SERPL-MCNC: 10.91 MG/DL
EGFRCR SERPLBLD CKD-EPI 2021: >90 ML/MIN/1.73M*2
EOSINOPHIL # BLD AUTO: 0.02 X10*3/UL (ref 0–0.7)
EOSINOPHIL NFR BLD AUTO: 0.2 %
ERYTHROCYTE [DISTWIDTH] IN BLOOD BY AUTOMATED COUNT: 15.5 % (ref 11.5–14.5)
ERYTHROCYTE [SEDIMENTATION RATE] IN BLOOD BY WESTERGREN METHOD: 97 MM/H (ref 0–20)
ETHANOL SERPL-MCNC: <10 MG/DL
GLUCOSE SERPL-MCNC: 139 MG/DL (ref 74–99)
HCT VFR BLD AUTO: 31.8 % (ref 41–52)
HGB BLD-MCNC: 10.8 G/DL (ref 13.5–17.5)
IMM GRANULOCYTES # BLD AUTO: 0.03 X10*3/UL (ref 0–0.7)
IMM GRANULOCYTES NFR BLD AUTO: 0.3 % (ref 0–0.9)
INR PPP: 1.2 (ref 0.9–1.2)
LYMPHOCYTES # BLD AUTO: 0.63 X10*3/UL (ref 1.2–4.8)
LYMPHOCYTES NFR BLD AUTO: 6.6 %
MAGNESIUM SERPL-MCNC: 2.26 MG/DL (ref 1.6–2.4)
MCH RBC QN AUTO: 37 PG (ref 26–34)
MCHC RBC AUTO-ENTMCNC: 34 G/DL (ref 32–36)
MCV RBC AUTO: 109 FL (ref 80–100)
MONOCYTES # BLD AUTO: 1.38 X10*3/UL (ref 0.1–1)
MONOCYTES NFR BLD AUTO: 14.5 %
NEUTROPHILS # BLD AUTO: 7.43 X10*3/UL (ref 1.2–7.7)
NEUTROPHILS NFR BLD AUTO: 78.1 %
NRBC BLD-RTO: 0 /100 WBCS (ref 0–0)
PLATELET # BLD AUTO: 128 X10*3/UL (ref 150–450)
POTASSIUM SERPL-SCNC: 3.9 MMOL/L (ref 3.5–5.3)
PROT SERPL-MCNC: 8.1 G/DL (ref 6.4–8.2)
PROTHROMBIN TIME: 12.8 SECONDS (ref 9.3–12.7)
RBC # BLD AUTO: 2.92 X10*6/UL (ref 4.5–5.9)
RH FACTOR (ANTIGEN D): NORMAL
RH FACTOR (ANTIGEN D): NORMAL
SODIUM SERPL-SCNC: 135 MMOL/L (ref 136–145)
WBC # BLD AUTO: 9.5 X10*3/UL (ref 4.4–11.3)

## 2025-02-18 PROCEDURE — 87040 BLOOD CULTURE FOR BACTERIA: CPT

## 2025-02-18 PROCEDURE — 73590 X-RAY EXAM OF LOWER LEG: CPT | Mod: LEFT SIDE | Performed by: RADIOLOGY

## 2025-02-18 PROCEDURE — 36415 COLL VENOUS BLD VENIPUNCTURE: CPT | Performed by: STUDENT IN AN ORGANIZED HEALTH CARE EDUCATION/TRAINING PROGRAM

## 2025-02-18 PROCEDURE — 86901 BLOOD TYPING SEROLOGIC RH(D): CPT | Performed by: INTERNAL MEDICINE

## 2025-02-18 PROCEDURE — 99285 EMERGENCY DEPT VISIT HI MDM: CPT | Mod: 25 | Performed by: STUDENT IN AN ORGANIZED HEALTH CARE EDUCATION/TRAINING PROGRAM

## 2025-02-18 PROCEDURE — 2500000004 HC RX 250 GENERAL PHARMACY W/ HCPCS (ALT 636 FOR OP/ED): Performed by: STUDENT IN AN ORGANIZED HEALTH CARE EDUCATION/TRAINING PROGRAM

## 2025-02-18 PROCEDURE — 87040 BLOOD CULTURE FOR BACTERIA: CPT | Mod: TRILAB | Performed by: INTERNAL MEDICINE

## 2025-02-18 PROCEDURE — 2500000004 HC RX 250 GENERAL PHARMACY W/ HCPCS (ALT 636 FOR OP/ED): Performed by: INTERNAL MEDICINE

## 2025-02-18 PROCEDURE — 86140 C-REACTIVE PROTEIN: CPT | Performed by: INTERNAL MEDICINE

## 2025-02-18 PROCEDURE — 73610 X-RAY EXAM OF ANKLE: CPT | Mod: 50

## 2025-02-18 PROCEDURE — 2500000004 HC RX 250 GENERAL PHARMACY W/ HCPCS (ALT 636 FOR OP/ED)

## 2025-02-18 PROCEDURE — 87075 CULTR BACTERIA EXCEPT BLOOD: CPT

## 2025-02-18 PROCEDURE — 2500000001 HC RX 250 WO HCPCS SELF ADMINISTERED DRUGS (ALT 637 FOR MEDICARE OP): Performed by: INTERNAL MEDICINE

## 2025-02-18 PROCEDURE — 93970 EXTREMITY STUDY: CPT | Performed by: RADIOLOGY

## 2025-02-18 PROCEDURE — 36415 COLL VENOUS BLD VENIPUNCTURE: CPT | Performed by: INTERNAL MEDICINE

## 2025-02-18 PROCEDURE — 73610 X-RAY EXAM OF ANKLE: CPT | Mod: BILATERAL PROCEDURE | Performed by: RADIOLOGY

## 2025-02-18 PROCEDURE — 82077 ASSAY SPEC XCP UR&BREATH IA: CPT | Performed by: INTERNAL MEDICINE

## 2025-02-18 PROCEDURE — 80361 OPIATES 1 OR MORE: CPT | Mod: TRILAB | Performed by: INTERNAL MEDICINE

## 2025-02-18 PROCEDURE — 83036 HEMOGLOBIN GLYCOSYLATED A1C: CPT | Mod: TRILAB | Performed by: INTERNAL MEDICINE

## 2025-02-18 PROCEDURE — 85652 RBC SED RATE AUTOMATED: CPT | Performed by: INTERNAL MEDICINE

## 2025-02-18 PROCEDURE — 86060 ANTISTREPTOLYSIN O TITER: CPT | Mod: TRILAB | Performed by: INTERNAL MEDICINE

## 2025-02-18 PROCEDURE — 85610 PROTHROMBIN TIME: CPT | Performed by: INTERNAL MEDICINE

## 2025-02-18 PROCEDURE — 99223 1ST HOSP IP/OBS HIGH 75: CPT | Performed by: INTERNAL MEDICINE

## 2025-02-18 PROCEDURE — 73564 X-RAY EXAM KNEE 4 OR MORE: CPT | Mod: BILATERAL PROCEDURE | Performed by: RADIOLOGY

## 2025-02-18 PROCEDURE — 73590 X-RAY EXAM OF LOWER LEG: CPT | Mod: LT

## 2025-02-18 PROCEDURE — G0378 HOSPITAL OBSERVATION PER HR: HCPCS

## 2025-02-18 PROCEDURE — 80307 DRUG TEST PRSMV CHEM ANLYZR: CPT | Performed by: INTERNAL MEDICINE

## 2025-02-18 PROCEDURE — 96372 THER/PROPH/DIAG INJ SC/IM: CPT | Performed by: INTERNAL MEDICINE

## 2025-02-18 PROCEDURE — 2500000001 HC RX 250 WO HCPCS SELF ADMINISTERED DRUGS (ALT 637 FOR MEDICARE OP): Performed by: STUDENT IN AN ORGANIZED HEALTH CARE EDUCATION/TRAINING PROGRAM

## 2025-02-18 PROCEDURE — 85025 COMPLETE CBC W/AUTO DIFF WBC: CPT | Performed by: STUDENT IN AN ORGANIZED HEALTH CARE EDUCATION/TRAINING PROGRAM

## 2025-02-18 PROCEDURE — 80053 COMPREHEN METABOLIC PANEL: CPT | Performed by: STUDENT IN AN ORGANIZED HEALTH CARE EDUCATION/TRAINING PROGRAM

## 2025-02-18 PROCEDURE — 83735 ASSAY OF MAGNESIUM: CPT | Performed by: INTERNAL MEDICINE

## 2025-02-18 PROCEDURE — 73564 X-RAY EXAM KNEE 4 OR MORE: CPT | Mod: 50

## 2025-02-18 PROCEDURE — 97161 PT EVAL LOW COMPLEX 20 MIN: CPT | Mod: GP

## 2025-02-18 PROCEDURE — 93970 EXTREMITY STUDY: CPT

## 2025-02-18 PROCEDURE — 81001 URINALYSIS AUTO W/SCOPE: CPT | Performed by: INTERNAL MEDICINE

## 2025-02-18 PROCEDURE — 82248 BILIRUBIN DIRECT: CPT | Performed by: INTERNAL MEDICINE

## 2025-02-18 RX ORDER — CEPHALEXIN 500 MG/1
500 CAPSULE ORAL ONCE
Status: COMPLETED | OUTPATIENT
Start: 2025-02-18 | End: 2025-02-18

## 2025-02-18 RX ORDER — OXYCODONE HYDROCHLORIDE 5 MG/1
5 TABLET ORAL EVERY 6 HOURS PRN
Status: DISCONTINUED | OUTPATIENT
Start: 2025-02-18 | End: 2025-02-24 | Stop reason: HOSPADM

## 2025-02-18 RX ORDER — POLYETHYLENE GLYCOL 3350 17 G/17G
17 POWDER, FOR SOLUTION ORAL DAILY
Status: DISCONTINUED | OUTPATIENT
Start: 2025-02-18 | End: 2025-02-24 | Stop reason: HOSPADM

## 2025-02-18 RX ORDER — PANTOPRAZOLE SODIUM 40 MG/10ML
40 INJECTION, POWDER, LYOPHILIZED, FOR SOLUTION INTRAVENOUS
Status: DISCONTINUED | OUTPATIENT
Start: 2025-02-19 | End: 2025-02-24 | Stop reason: HOSPADM

## 2025-02-18 RX ORDER — MULTIVIT-MIN/IRON FUM/FOLIC AC 7.5 MG-4
1 TABLET ORAL DAILY
Status: DISCONTINUED | OUTPATIENT
Start: 2025-02-18 | End: 2025-02-24 | Stop reason: HOSPADM

## 2025-02-18 RX ORDER — COLCHICINE 0.6 MG/1
0.6 TABLET ORAL 2 TIMES DAILY
Status: DISCONTINUED | OUTPATIENT
Start: 2025-02-19 | End: 2025-02-24 | Stop reason: HOSPADM

## 2025-02-18 RX ORDER — THIAMINE HYDROCHLORIDE 100 MG/ML
100 INJECTION, SOLUTION INTRAMUSCULAR; INTRAVENOUS DAILY
Status: COMPLETED | OUTPATIENT
Start: 2025-02-18 | End: 2025-02-20

## 2025-02-18 RX ORDER — VANCOMYCIN HYDROCHLORIDE 1 G/20ML
INJECTION, POWDER, LYOPHILIZED, FOR SOLUTION INTRAVENOUS DAILY PRN
Status: DISCONTINUED | OUTPATIENT
Start: 2025-02-18 | End: 2025-02-23

## 2025-02-18 RX ORDER — COLCHICINE 0.6 MG/1
1.2 TABLET ORAL ONCE
Status: COMPLETED | OUTPATIENT
Start: 2025-02-18 | End: 2025-02-18

## 2025-02-18 RX ORDER — MULTIVITAMIN
1 TABLET ORAL
COMMUNITY
Start: 2024-05-24 | End: 2025-02-18 | Stop reason: ENTERED-IN-ERROR

## 2025-02-18 RX ORDER — IBUPROFEN 800 MG/1
800 TABLET ORAL EVERY 8 HOURS PRN
Status: DISCONTINUED | OUTPATIENT
Start: 2025-02-18 | End: 2025-02-24 | Stop reason: HOSPADM

## 2025-02-18 RX ORDER — PANTOPRAZOLE SODIUM 40 MG/1
40 TABLET, DELAYED RELEASE ORAL
Status: DISCONTINUED | OUTPATIENT
Start: 2025-02-19 | End: 2025-02-24 | Stop reason: HOSPADM

## 2025-02-18 RX ORDER — OXYCODONE AND ACETAMINOPHEN 5; 325 MG/1; MG/1
1 TABLET ORAL ONCE
Status: COMPLETED | OUTPATIENT
Start: 2025-02-18 | End: 2025-02-18

## 2025-02-18 RX ORDER — ENOXAPARIN SODIUM 100 MG/ML
40 INJECTION SUBCUTANEOUS EVERY 24 HOURS
Status: DISCONTINUED | OUTPATIENT
Start: 2025-02-18 | End: 2025-02-24 | Stop reason: HOSPADM

## 2025-02-18 RX ORDER — COLCHICINE 0.6 MG/1
0.6 TABLET ORAL ONCE
Status: COMPLETED | OUTPATIENT
Start: 2025-02-18 | End: 2025-02-18

## 2025-02-18 RX ORDER — ACETAMINOPHEN 325 MG/1
650 TABLET ORAL EVERY 6 HOURS PRN
Status: DISCONTINUED | OUTPATIENT
Start: 2025-02-18 | End: 2025-02-24 | Stop reason: HOSPADM

## 2025-02-18 RX ORDER — COLCHICINE 0.6 MG/1
1.2 TABLET ORAL DAILY PRN
COMMUNITY
Start: 2024-06-26 | End: 2025-02-24 | Stop reason: HOSPADM

## 2025-02-18 RX ORDER — PREDNISONE 20 MG/1
20 TABLET ORAL ONCE
Status: COMPLETED | OUTPATIENT
Start: 2025-02-18 | End: 2025-02-18

## 2025-02-18 RX ORDER — VANCOMYCIN 1.25 G/250ML
1750 INJECTION, SOLUTION INTRAVENOUS
Status: DISCONTINUED | OUTPATIENT
Start: 2025-02-18 | End: 2025-02-22

## 2025-02-18 RX ORDER — LANOLIN ALCOHOL/MO/W.PET/CERES
100 CREAM (GRAM) TOPICAL DAILY
Status: DISCONTINUED | OUTPATIENT
Start: 2025-02-21 | End: 2025-02-24 | Stop reason: HOSPADM

## 2025-02-18 RX ORDER — FOLIC ACID 1 MG/1
1 TABLET ORAL DAILY
Status: DISCONTINUED | OUTPATIENT
Start: 2025-02-18 | End: 2025-02-24 | Stop reason: HOSPADM

## 2025-02-18 RX ADMIN — OXYCODONE HYDROCHLORIDE AND ACETAMINOPHEN 1 TABLET: 5; 325 TABLET ORAL at 09:17

## 2025-02-18 RX ADMIN — PREDNISONE 20 MG: 20 TABLET ORAL at 09:18

## 2025-02-18 RX ADMIN — CEPHALEXIN 500 MG: 500 CAPSULE ORAL at 09:17

## 2025-02-18 RX ADMIN — AMPICILLIN SODIUM AND SULBACTAM SODIUM 3 G: 2; 1 INJECTION, POWDER, FOR SOLUTION INTRAMUSCULAR; INTRAVENOUS at 17:47

## 2025-02-18 RX ADMIN — COLCHICINE 0.6 MG: 0.6 TABLET, FILM COATED ORAL at 17:24

## 2025-02-18 RX ADMIN — VANCOMYCIN 1750 MG: 1.25 INJECTION, SOLUTION INTRAVENOUS at 20:11

## 2025-02-18 RX ADMIN — COLCHICINE 1.2 MG: 0.6 TABLET, FILM COATED ORAL at 09:18

## 2025-02-18 RX ADMIN — THIAMINE HYDROCHLORIDE 100 MG: 100 INJECTION, SOLUTION INTRAMUSCULAR; INTRAVENOUS at 17:24

## 2025-02-18 RX ADMIN — ENOXAPARIN SODIUM 40 MG: 40 INJECTION SUBCUTANEOUS at 17:24

## 2025-02-18 RX ADMIN — AMPICILLIN SODIUM AND SULBACTAM SODIUM 3 G: 2; 1 INJECTION, POWDER, FOR SOLUTION INTRAMUSCULAR; INTRAVENOUS at 23:20

## 2025-02-18 SDOH — SOCIAL STABILITY: SOCIAL INSECURITY
WITHIN THE LAST YEAR, HAVE YOU BEEN KICKED, HIT, SLAPPED, OR OTHERWISE PHYSICALLY HURT BY YOUR PARTNER OR EX-PARTNER?: NO

## 2025-02-18 SDOH — SOCIAL STABILITY: SOCIAL INSECURITY: HAS ANYONE EVER THREATENED TO HURT YOUR FAMILY OR YOUR PETS?: NO

## 2025-02-18 SDOH — ECONOMIC STABILITY: HOUSING INSECURITY: AT ANY TIME IN THE PAST 12 MONTHS, WERE YOU HOMELESS OR LIVING IN A SHELTER (INCLUDING NOW)?: NO

## 2025-02-18 SDOH — SOCIAL STABILITY: SOCIAL INSECURITY: DOES ANYONE TRY TO KEEP YOU FROM HAVING/CONTACTING OTHER FRIENDS OR DOING THINGS OUTSIDE YOUR HOME?: NO

## 2025-02-18 SDOH — SOCIAL STABILITY: SOCIAL INSECURITY
WITHIN THE LAST YEAR, HAVE YOU BEEN RAPED OR FORCED TO HAVE ANY KIND OF SEXUAL ACTIVITY BY YOUR PARTNER OR EX-PARTNER?: NO

## 2025-02-18 SDOH — SOCIAL STABILITY: SOCIAL INSECURITY: WITHIN THE LAST YEAR, HAVE YOU BEEN AFRAID OF YOUR PARTNER OR EX-PARTNER?: NO

## 2025-02-18 SDOH — ECONOMIC STABILITY: HOUSING INSECURITY: IN THE LAST 12 MONTHS, WAS THERE A TIME WHEN YOU WERE NOT ABLE TO PAY THE MORTGAGE OR RENT ON TIME?: NO

## 2025-02-18 SDOH — ECONOMIC STABILITY: FOOD INSECURITY
WITHIN THE PAST 12 MONTHS, YOU WORRIED THAT YOUR FOOD WOULD RUN OUT BEFORE YOU GOT THE MONEY TO BUY MORE.: SOMETIMES TRUE

## 2025-02-18 SDOH — SOCIAL STABILITY: SOCIAL INSECURITY: ARE YOU OR HAVE YOU BEEN THREATENED OR ABUSED PHYSICALLY, EMOTIONALLY, OR SEXUALLY BY ANYONE?: NO

## 2025-02-18 SDOH — SOCIAL STABILITY: SOCIAL INSECURITY: WITHIN THE LAST YEAR, HAVE YOU BEEN HUMILIATED OR EMOTIONALLY ABUSED IN OTHER WAYS BY YOUR PARTNER OR EX-PARTNER?: NO

## 2025-02-18 SDOH — SOCIAL STABILITY: SOCIAL INSECURITY: ARE THERE ANY APPARENT SIGNS OF INJURIES/BEHAVIORS THAT COULD BE RELATED TO ABUSE/NEGLECT?: NO

## 2025-02-18 SDOH — ECONOMIC STABILITY: FOOD INSECURITY: HOW HARD IS IT FOR YOU TO PAY FOR THE VERY BASICS LIKE FOOD, HOUSING, MEDICAL CARE, AND HEATING?: SOMEWHAT HARD

## 2025-02-18 SDOH — ECONOMIC STABILITY: FOOD INSECURITY: WITHIN THE PAST 12 MONTHS, THE FOOD YOU BOUGHT JUST DIDN'T LAST AND YOU DIDN'T HAVE MONEY TO GET MORE.: SOMETIMES TRUE

## 2025-02-18 SDOH — ECONOMIC STABILITY: INCOME INSECURITY: IN THE PAST 12 MONTHS HAS THE ELECTRIC, GAS, OIL, OR WATER COMPANY THREATENED TO SHUT OFF SERVICES IN YOUR HOME?: NO

## 2025-02-18 SDOH — SOCIAL STABILITY: SOCIAL INSECURITY: WERE YOU ABLE TO COMPLETE ALL THE BEHAVIORAL HEALTH SCREENINGS?: YES

## 2025-02-18 SDOH — SOCIAL STABILITY: SOCIAL INSECURITY: HAVE YOU HAD THOUGHTS OF HARMING ANYONE ELSE?: NO

## 2025-02-18 SDOH — SOCIAL STABILITY: SOCIAL INSECURITY: ABUSE: ADULT

## 2025-02-18 SDOH — ECONOMIC STABILITY: HOUSING INSECURITY: IN THE PAST 12 MONTHS, HOW MANY TIMES HAVE YOU MOVED WHERE YOU WERE LIVING?: 0

## 2025-02-18 SDOH — SOCIAL STABILITY: SOCIAL INSECURITY: DO YOU FEEL UNSAFE GOING BACK TO THE PLACE WHERE YOU ARE LIVING?: NO

## 2025-02-18 SDOH — SOCIAL STABILITY: SOCIAL INSECURITY: HAVE YOU HAD ANY THOUGHTS OF HARMING ANYONE ELSE?: NO

## 2025-02-18 SDOH — ECONOMIC STABILITY: TRANSPORTATION INSECURITY: IN THE PAST 12 MONTHS, HAS LACK OF TRANSPORTATION KEPT YOU FROM MEDICAL APPOINTMENTS OR FROM GETTING MEDICATIONS?: YES

## 2025-02-18 SDOH — SOCIAL STABILITY: SOCIAL INSECURITY: DO YOU FEEL ANYONE HAS EXPLOITED OR TAKEN ADVANTAGE OF YOU FINANCIALLY OR OF YOUR PERSONAL PROPERTY?: NO

## 2025-02-18 ASSESSMENT — ENCOUNTER SYMPTOMS
MUSCULOSKELETAL NEGATIVE: 1
ALLERGIC/IMMUNOLOGIC NEGATIVE: 1
HEMATOLOGIC/LYMPHATIC NEGATIVE: 1
PSYCHIATRIC NEGATIVE: 1
EYES NEGATIVE: 1
COLOR CHANGE: 1
NEUROLOGICAL NEGATIVE: 1
RESPIRATORY NEGATIVE: 1
ENDOCRINE NEGATIVE: 1
GASTROINTESTINAL NEGATIVE: 1
ARTHRALGIAS: 1
JOINT SWELLING: 1
WEAKNESS: 1
CONSTITUTIONAL NEGATIVE: 1

## 2025-02-18 ASSESSMENT — LIFESTYLE VARIABLES
HOW OFTEN DO YOU HAVE 6 OR MORE DRINKS ON ONE OCCASION: NEVER
HEADACHE, FULLNESS IN HEAD: NOT PRESENT
VISUAL DISTURBANCES: NOT PRESENT
NAUSEA AND VOMITING: NO NAUSEA AND NO VOMITING
HEADACHE, FULLNESS IN HEAD: NOT PRESENT
PAROXYSMAL SWEATS: NO SWEAT VISIBLE
TREMOR: NO TREMOR
NAUSEA AND VOMITING: NO NAUSEA AND NO VOMITING
HOW OFTEN DO YOU HAVE A DRINK CONTAINING ALCOHOL: NEVER
TOTAL SCORE: 0
HEADACHE, FULLNESS IN HEAD: NOT PRESENT
ANXIETY: NO ANXIETY, AT EASE
TREMOR: NOT VISIBLE, BUT CAN BE FELT FINGERTIP TO FINGERTIP
AUDITORY DISTURBANCES: NOT PRESENT
ORIENTATION AND CLOUDING OF SENSORIUM: ORIENTED AND CAN DO SERIAL ADDITIONS
HEADACHE, FULLNESS IN HEAD: NOT PRESENT
AUDIT-C TOTAL SCORE: 0
ORIENTATION AND CLOUDING OF SENSORIUM: ORIENTED AND CAN DO SERIAL ADDITIONS
HEADACHE, FULLNESS IN HEAD: NOT PRESENT
NAUSEA AND VOMITING: NO NAUSEA AND NO VOMITING
PAROXYSMAL SWEATS: NO SWEAT VISIBLE
AUDITORY DISTURBANCES: NOT PRESENT
VISUAL DISTURBANCES: NOT PRESENT
NAUSEA AND VOMITING: NO NAUSEA AND NO VOMITING
VISUAL DISTURBANCES: NOT PRESENT
AGITATION: NORMAL ACTIVITY
ANXIETY: NO ANXIETY, AT EASE
TOTAL SCORE: 2
NAUSEA AND VOMITING: NO NAUSEA AND NO VOMITING
ORIENTATION AND CLOUDING OF SENSORIUM: ORIENTED AND CAN DO SERIAL ADDITIONS
AUDITORY DISTURBANCES: NOT PRESENT
ANXIETY: NO ANXIETY, AT EASE
TREMOR: NO TREMOR
TREMOR: NO TREMOR
HOW MANY STANDARD DRINKS CONTAINING ALCOHOL DO YOU HAVE ON A TYPICAL DAY: PATIENT DOES NOT DRINK
AUDITORY DISTURBANCES: NOT PRESENT
PAROXYSMAL SWEATS: NO SWEAT VISIBLE
VISUAL DISTURBANCES: NOT PRESENT
HEADACHE, FULLNESS IN HEAD: NOT PRESENT
ORIENTATION AND CLOUDING OF SENSORIUM: ORIENTED AND CAN DO SERIAL ADDITIONS
PAROXYSMAL SWEATS: NO SWEAT VISIBLE
AGITATION: NORMAL ACTIVITY
TOTAL SCORE: 0
VISUAL DISTURBANCES: NOT PRESENT
PAROXYSMAL SWEATS: NO SWEAT VISIBLE
AUDIT-C TOTAL SCORE: 0
NAUSEA AND VOMITING: NO NAUSEA AND NO VOMITING
AGITATION: NORMAL ACTIVITY
AUDITORY DISTURBANCES: NOT PRESENT
TOTAL SCORE: 0
AGITATION: NORMAL ACTIVITY
AUDITORY DISTURBANCES: NOT PRESENT
ANXIETY: NO ANXIETY, AT EASE
PAROXYSMAL SWEATS: NO SWEAT VISIBLE
ORIENTATION AND CLOUDING OF SENSORIUM: ORIENTED AND CAN DO SERIAL ADDITIONS
AGITATION: NORMAL ACTIVITY
TREMOR: NO TREMOR
ANXIETY: NO ANXIETY, AT EASE
TOTAL SCORE: 1
SKIP TO QUESTIONS 9-10: 1
ANXIETY: NO ANXIETY, AT EASE
VISUAL DISTURBANCES: NOT PRESENT
AGITATION: NORMAL ACTIVITY
TREMOR: 2
TOTAL SCORE: 0
ORIENTATION AND CLOUDING OF SENSORIUM: ORIENTED AND CAN DO SERIAL ADDITIONS

## 2025-02-18 ASSESSMENT — ACTIVITIES OF DAILY LIVING (ADL)
BATHING: NEEDS ASSISTANCE
ADL_ASSISTANCE: INDEPENDENT
GROOMING: NEEDS ASSISTANCE
WALKS IN HOME: NEEDS ASSISTANCE
TOILETING: NEEDS ASSISTANCE
JUDGMENT_ADEQUATE_SAFELY_COMPLETE_DAILY_ACTIVITIES: YES
DRESSING YOURSELF: NEEDS ASSISTANCE
ADEQUATE_TO_COMPLETE_ADL: YES
LACK_OF_TRANSPORTATION: YES
ASSISTIVE_DEVICE: WALKER
HEARING - LEFT EAR: FUNCTIONAL
FEEDING YOURSELF: NEEDS ASSISTANCE
LACK_OF_TRANSPORTATION: YES
HEARING - RIGHT EAR: FUNCTIONAL
PATIENT'S MEMORY ADEQUATE TO SAFELY COMPLETE DAILY ACTIVITIES?: YES

## 2025-02-18 ASSESSMENT — COGNITIVE AND FUNCTIONAL STATUS - GENERAL
MOBILITY SCORE: 15
PERSONAL GROOMING: A LITTLE
PATIENT BASELINE BEDBOUND: NO
MOVING FROM LYING ON BACK TO SITTING ON SIDE OF FLAT BED WITH BEDRAILS: A LITTLE
DRESSING REGULAR UPPER BODY CLOTHING: A LITTLE
STANDING UP FROM CHAIR USING ARMS: A LOT
DAILY ACTIVITIY SCORE: 17
TOILETING: A LITTLE
STANDING UP FROM CHAIR USING ARMS: A LOT
TURNING FROM BACK TO SIDE WHILE IN FLAT BAD: A LOT
DRESSING REGULAR UPPER BODY CLOTHING: A LITTLE
MOVING TO AND FROM BED TO CHAIR: TOTAL
DAILY ACTIVITIY SCORE: 20
CLIMB 3 TO 5 STEPS WITH RAILING: A LOT
WALKING IN HOSPITAL ROOM: TOTAL
HELP NEEDED FOR BATHING: A LITTLE
MOBILITY SCORE: 13
MOBILITY SCORE: 8
CLIMB 3 TO 5 STEPS WITH RAILING: TOTAL
WALKING IN HOSPITAL ROOM: A LOT
TURNING FROM BACK TO SIDE WHILE IN FLAT BAD: A LITTLE
DRESSING REGULAR LOWER BODY CLOTHING: A LITTLE
HELP NEEDED FOR BATHING: A LOT
DRESSING REGULAR LOWER BODY CLOTHING: A LITTLE
STANDING UP FROM CHAIR USING ARMS: TOTAL
MOVING TO AND FROM BED TO CHAIR: A LOT
CLIMB 3 TO 5 STEPS WITH RAILING: TOTAL
TOILETING: A LOT
MOVING TO AND FROM BED TO CHAIR: A LOT
MOVING FROM LYING ON BACK TO SITTING ON SIDE OF FLAT BED WITH BEDRAILS: A LOT
TURNING FROM BACK TO SIDE WHILE IN FLAT BAD: A LITTLE
WALKING IN HOSPITAL ROOM: A LOT

## 2025-02-18 ASSESSMENT — PAIN SCALES - GENERAL
PAINLEVEL_OUTOF10: 0 - NO PAIN
PAINLEVEL_OUTOF10: 8
PAINLEVEL_OUTOF10: 1
PAINLEVEL_OUTOF10: 0 - NO PAIN

## 2025-02-18 ASSESSMENT — PAIN - FUNCTIONAL ASSESSMENT
PAIN_FUNCTIONAL_ASSESSMENT: 0-10

## 2025-02-18 ASSESSMENT — COLUMBIA-SUICIDE SEVERITY RATING SCALE - C-SSRS
1. IN THE PAST MONTH, HAVE YOU WISHED YOU WERE DEAD OR WISHED YOU COULD GO TO SLEEP AND NOT WAKE UP?: NO
6. HAVE YOU EVER DONE ANYTHING, STARTED TO DO ANYTHING, OR PREPARED TO DO ANYTHING TO END YOUR LIFE?: NO
6. HAVE YOU EVER DONE ANYTHING, STARTED TO DO ANYTHING, OR PREPARED TO DO ANYTHING TO END YOUR LIFE?: NO
1. IN THE PAST MONTH, HAVE YOU WISHED YOU WERE DEAD OR WISHED YOU COULD GO TO SLEEP AND NOT WAKE UP?: NO
2. HAVE YOU ACTUALLY HAD ANY THOUGHTS OF KILLING YOURSELF?: NO
2. HAVE YOU ACTUALLY HAD ANY THOUGHTS OF KILLING YOURSELF?: NO

## 2025-02-18 ASSESSMENT — PATIENT HEALTH QUESTIONNAIRE - PHQ9
1. LITTLE INTEREST OR PLEASURE IN DOING THINGS: NOT AT ALL
2. FEELING DOWN, DEPRESSED OR HOPELESS: NOT AT ALL
SUM OF ALL RESPONSES TO PHQ9 QUESTIONS 1 & 2: 0

## 2025-02-18 ASSESSMENT — PAIN DESCRIPTION - LOCATION: LOCATION: LEG

## 2025-02-18 ASSESSMENT — PAIN SCALES - PAIN ASSESSMENT IN ADVANCED DEMENTIA (PAINAD): TOTALSCORE: MEDICATION (SEE MAR)

## 2025-02-18 NOTE — ED PROVIDER NOTES
HPI   Chief Complaint   Patient presents with    Gout     Patient [facility] for gout flare up. Patient is a&ox4 and nad. Patient unable to ambulate at baseline uses walker. vss       Patient presents with pain in his legs.  He reports that initially he had some pain and swelling in his right knee as well as his right ankle.  He states that he ran out of his gout medication and was not able to get to the pharmacy to get more.  He has had gout flares in the past which have been similar.  He does live at home but is had difficulty getting around due to his gout.  EMS reports that patient has been urinating and cups next to him because he is not able to get around his house.  He normally uses a walker.              Patient History   Past Medical History:   Diagnosis Date    Foreign body in cornea, left eye, initial encounter 11/02/2017    Acute foreign body of left cornea     Past Surgical History:   Procedure Laterality Date    HERNIA REPAIR  10/18/2017    Hernia Repair     No family history on file.  Social History     Tobacco Use    Smoking status: Not on file    Smokeless tobacco: Not on file   Substance Use Topics    Alcohol use: Not on file    Drug use: Not on file       Physical Exam   ED Triage Vitals [02/18/25 0841]   Temperature Heart Rate Respirations BP   36.8 °C (98.2 °F) 100 16 145/69      Pulse Ox Temp Source Heart Rate Source Patient Position   100 % Temporal -- --      BP Location FiO2 (%)     -- --       Physical Exam  HENT:      Head: Normocephalic.   Musculoskeletal:      Comments: Some redness and swelling of the left shin.  Swelling of bilateral knees and ankles.   Neurological:      Mental Status: He is alert.      Comments: Patient is awake and alert, answers questions appropriately.           ED Course & MDM   Diagnoses as of 02/18/25 1248   Acute idiopathic gout of multiple sites   Pain in both lower extremities                 No data recorded                                 Medical Decision  Making  Symptoms felt to be secondary to gout.  Laboratory studies reveal no leukocytosis.  Patient was given colchicine, steroid, and well my suspicion for infection is lower, there was significant erythema of the left shin, and patient was given cephalexin in case of possible cellulitis.  Despite analgesics, patient was not able to ambulate in the emergency department.  He was not able to ambulate at home either for several days.  Patient accepted to medical service for further management in setting of ambulatory failure.  Parts of this chart were completed with dictation software, please excuse any errors in transcription.        Procedure  Procedures     Neeraj Jackson MD  02/18/25 5388

## 2025-02-18 NOTE — H&P
History Of Present Illness  Jake Banerjee is a 69 y.o. male presenting with inability to ambulate due to bilateral lower extremity swelling and pain.  He states that he has a previous diagnosis of gout in he believes that this is his gout flaring up.  The emergency room he had a workup by the ER physician who felt the patient may have some cellulitic change in the left calf but basically requested me to admit him for inability to ambulate due to gout flareup.  Patient is a poor historian.  He admits to daily alcohol intake which she describes as 1 beer with dinner.  He denies any fever chills nausea vomiting chest pain.  He states that the knees and the ankles have been swollen for some time but worse over the last several days.  Apparently he uses crutches or rolling walker.  I am not sure he can walk unassisted.  Past Medical History  He has a past medical history of Foreign body in cornea, left eye, initial encounter (11/02/2017).  Left knee aspiration from October of last year demonstrated uric acid crystals, consistent diagnosis of gout  Surgical History  He has a past surgical history that includes Hernia repair (10/18/2017).  Reviewed  Social History  He has no history on file for tobacco use, alcohol use, and drug use.  He lives with a roommate, alcohol use as above  Family History  No family history on file.     Allergies  Sulfa (sulfonamide antibiotics)    ROS  Review of Systems   Constitutional: Negative.    HENT: Negative.     Eyes: Negative.    Respiratory: Negative.     Cardiovascular:  Positive for leg swelling.   Gastrointestinal: Negative.    Endocrine: Negative.    Genitourinary: Negative.    Musculoskeletal: Negative.    Skin: Negative.    Allergic/Immunologic: Negative.    Neurological: Negative.    Hematological: Negative.    Psychiatric/Behavioral: Negative.     All other systems reviewed and are negative.       Last Recorded Vitals  /71 (BP Location: Right arm, Patient Position: Lying)    Pulse 83   Temp 36.6 °C (97.9 °F) (Temporal)   Resp 16   Wt 70.3 kg (155 lb)   SpO2 96%     Physical Exam  I says patient emergency room bed number time.  Alert oriented x 3 cooperative  male pleasant  Normocephalic/atraumatic EOMI PERRLA  Neck supple  Chest clear but diminished no adventitious breath sounds  Heart regular S1-S2 distant  Abdomen soft nontender  Extremities there is bilateral swelling of both ankles in's in both knees but the left worse.  Left knee however is not red or warm but is definitely swollen.  There is also cellulitic change in the left calf good peripheral pulses  Neurologic exam grossly nonfocal.  He has difficulty moving his legs but this is mostly due to pain, at least this is my impression; he has preserved sensation    Relevant Results  Reviewed    ASSESSMENT/PLAN  Assessment/Plan   Assessment & Plan  Acute idiopathic gout of multiple sites    Ambulatory dysfunction    Cellulitis and abscess of left lower extremity    Alcohol abuse    February 18,    He does not appear toxic.  I feel gout flareup plus cellulitis of the left calf are very likely as his diagnosis.  Will check x-rays of both knees but ankles are asked podiatry and orthopedics to evaluate ankles and feet respectively will defer to them decision about draining any of these acutely swollen joints.  In the meantime we will continue with colchicine for acute gout flareup he did receive 20 of prednisone but at this point will not continue prednisone until further evaluated.  Will check ESR and CRP PT OT eval.  Usual alcohol withdrawal precautions       Raghav Mcdonald MD

## 2025-02-18 NOTE — CONSULTS
Vancomycin Dosing by Pharmacy- INITIAL    Jake Banerjee is a 69 y.o. year old male who Pharmacy has been consulted for vancomycin dosing for osteomyelitis/septic arthritis. Based on the patient's indication and renal status this patient will be dosed based on a goal AUC of 400-600.     Renal function is currently stable.    Visit Vitals  /71 (BP Location: Right arm, Patient Position: Lying)   Pulse 83   Temp 36.6 °C (97.9 °F) (Temporal)   Resp 16        Lab Results   Component Value Date    CREATININE 0.80 2025    CREATININE 0.85 10/11/2024    CREATININE 0.90 10/11/2023    CREATININE 0.8 2023    CREATININE 0.9 2023    CREATININE 1.0 2021    CREATININE 0.98 2018        Patient weight is as follows:   Vitals:    25 0841   Weight: 70.3 kg (155 lb)       Cultures:  No results found for the encounter in last 14 days.        No intake/output data recorded.  I/O during current shift:  No intake/output data recorded.    Temp (24hrs), Av.7 °C (98.1 °F), Min:36.6 °C (97.9 °F), Max:36.8 °C (98.2 °F)         Assessment/Plan     Patient will not be given a loading dose.  Will initiate vancomycin maintenance,  1750 mg every 24 hours.    This dosing regimen is predicted by [employer] to result in the following pharmacokinetic parameters:  <<<<<Insight[employer] DATA>>>>>  Loading dose: N/A  Regimen: 1750 mg IV every 24 hours.  Start time: 17:53 on 2025  Exposure target: AUC24 (range)400-600 mg/L.hr   [id number]: 405 mg/L.hr  AUC24,ss: 479 mg/L.hr  Probability of AUC24 > 400: 71 %  Ctrough,ss: 12 mg/L  Probability of Ctrough,ss > 20: 13 %    Follow-up level will be ordered on  at 0500 unless clinically indicated sooner.  Will continue to monitor renal function daily while on vancomycin and order serum creatinine at least every 48 hours if not already ordered.  Follow for continued vancomycin needs, clinical response, and signs/symptoms of toxicity.       LISA Bejarano, PharmD

## 2025-02-18 NOTE — CARE PLAN
The patient's goals for the shift include   Problem: Fall/Injury  Goal: Not fall by end of shift  Outcome: Progressing     Problem: Skin  Goal: Prevent/manage excess moisture  Outcome: Progressing     Problem: Fall/Injury  Goal: Use assistive devices by end of the shift  Outcome: Progressing        The clinical goals for the shift include pain control    Over the shift, the patient did make progress toward the following goals. Recommendations to address these barriers include assessing pain, call light in reach, bed alarm set, appropriately medicating through the shift.

## 2025-02-18 NOTE — PROGRESS NOTES
Physical Therapy    Physical Therapy Evaluation    Patient Name: Jake Banerjee  MRN: [id number]  Department: [facility] ED  Room: [place] [address]  Today's Date: 2/18/2025   Time Calculation  Start Time: 1132  Stop Time: 1153  Time Calculation (min): 21 min    Assessment/Plan   PT Assessment  PT Assessment Results: Decreased strength, Decreased range of motion, Decreased endurance, Impaired balance, Decreased mobility, Decreased cognition, Impaired judgement, Decreased safety awareness, Decreased skin integrity, Pain  Rehab Prognosis: Fair  Barriers to Discharge Home: Caregiver assistance, Cognition needs, Physical needs  Caregiver Assistance: Caregiver assistance needed per identified barriers - however, level of patient's required assistance exceeds assistance available at home  Physical Needs: High falls risk due to function or environment, 24hr ADL assistance needed, 24hr mobility assistance needed  Evaluation/Treatment Tolerance: Patient limited by pain  Medical Staff Made Aware: Yes  Strengths: Premorbid level of function  Barriers to Participation: Ability to acquire knowledge, Attitude of self, Comorbidities, Insight into problems  End of Session Communication: Bedside nurse  Assessment Comment: 69 year old male presents to the ED with gout flare up. Unable to stand/walk as a result. On eval, he demonstrates impaired mobility warranting skilled PT care. At DC, moderate intensity rehab is recommended.  End of Session Patient Position: Bed, 2 rail up, Alarm off, not on at start of session (ED bed)  IP OR SWING BED PT PLAN  Inpatient or Swing Bed: Inpatient  PT Plan  Treatment/Interventions: Bed mobility, Gait training, Transfer training, Stair training, Balance training, Strengthening, Endurance training, Therapeutic exercise, Therapeutic activity, Home exercise program  PT Plan: Ongoing PT  PT Frequency: 3 times per week  PT Discharge Recommendations: Moderate intensity level of continued care  Equipment Recommended upon  Discharge: Wheeled walker  PT Recommended Transfer Status:  (TBD)  PT - OK to Discharge: Yes    Subjective   General Visit Information:  General  Reason for Referral: Impaired Mobility-Gout Flare up  Referred By: Dr. Jackson  Past Medical History Relevant to Rehab: gout  Family/Caregiver Present: No  Prior to Session Communication: Bedside nurse  Patient Position Received: Bed, 2 rail up, Alarm off, not on at start of session (ED bed)  Preferred Learning Style: verbal, visual  General Comment: 69 year old male presents to the ED with gout flare up. Unable to stand/walk as a result.  Home Living:  Home Living  Type of Home: House  Lives With:  (roomate)  Home Adaptive Equipment: Walker rolling or standard  Home Layout: Multi-level, Able to live on main level with bedroom/bathroom  Home Access: Stairs to enter with rails  Entrance Stairs-Rails: Left  Entrance Stairs-Number of Steps: 3  Bathroom Shower/Tub: Tub/shower unit  Bathroom Equipment: Grab bars in shower  Prior Level of Function:  Prior Function Per Pt/Caregiver Report  Level of Stem: Independent with ADLs and functional transfers, Independent with homemaking with ambulation  ADL Assistance: Independent  Homemaking Assistance: Independent  Ambulatory Assistance: Independent  Prior Function Comments: Typically no AD but reports 5x falls/ 3 months due to LOBs. The last 2 days he reports being unable to transfer/walk due to his gout. Unable to dress/bath as well  Precautions:  Precautions  Medical Precautions: Fall precautions    Vital Signs Comment: RA     Objective   Pain:  Pain Assessment  Pain Assessment: 0-10  0-10 (Numeric) Pain Score: 0 - No pain (at rest but 5/10 FLACC throughout BLEs with movement)  Cognition:  Cognition  Overall Cognitive Status: Impaired  Orientation Level: Oriented X4  Cognition Comments: AxO4 but poor insight into medical situation and functional deficits  Insight: Severe    General Assessments:  Activity Tolerance  Endurance:  Decreased tolerance for upright activites    Sensation  Light Touch:  (Declines attempts at LE sensation testing)    Strength  Strength Comments: Attempted MMT sitting at EOB. Pt is very fearful of LE pain due to flare up and refuses strength or ROM testing at this time. Generally appears very stiff with B knees fully extended out  Static Sitting Balance  Static Sitting-Balance Support: Bilateral upper extremity supported  Static Sitting-Level of Assistance: Close supervision  Static Sitting-Comment/Number of Minutes: x5mins at EOB  Functional Assessments:  Bed Mobility  Bed Mobility: Yes  Bed Mobility 1  Bed Mobility 1: Supine to sitting  Level of Assistance 1: Moderate assistance  Bed Mobility Comments 1: to bring BLEs off EOB and scoot hips towards floor. Cues on technique  Bed Mobility 2  Bed Mobility  2: Sitting to supine  Level of Assistance 2: Moderate assistance  Bed Mobility Comments 2: to bring BLEs back into bed. Able to self control trunk    Transfers  Transfer: No (From EOB sitting, walker placed in front of Pt. Despite reassurance of safety, Pt adamently declines attempting to stand stating his legs will not hold him enough to try.)    Ambulation/Gait Training  Ambulation/Gait Training Performed: No    Outcome Measures:  [facility] Basic Mobility  Turning from your back to your side while in a flat bed without using bedrails: A lot  Moving from lying on your back to sitting on the side of a flat bed without using bedrails: A lot  Moving to and from bed to chair (including a wheelchair): Total  Standing up from a chair using your arms (e.g. wheelchair or bedside chair): Total  To walk in hospital room: Total  Climbing 3-5 steps with railing: Total  Basic Mobility - Total Score: 8    Encounter Problems       Encounter Problems (Active)       Balance       Standing Balance (Progressing)       Start:  02/18/25    Expected End:  03/04/25       Pt will demonstrate good static standing balance to promote safe  participation with out of bed activity, transfers, and mobility           Standing Balance (Progressing)       Start:  02/18/25    Expected End:  03/04/25       Pt will demonstrate good static standing balance to promote safe participation with out of bed activity, transfers, and mobility              Mobility       Ambulation (Progressing)       Start:  02/18/25    Expected End:  03/04/25       Pt will ambulate 50' modified independent assist with LRD to promote safe home mobility           Entry Stair Negotiation (Progressing)       Start:  02/18/25    Expected End:  03/04/25       Pt will ascend/descend 3 stairs with rail(s) on L and modified independent assist to promote safe entry and exit in home environment                PT Transfers       Supine to sit (Progressing)       Start:  02/18/25    Expected End:  03/04/25       Pt will transfer supine to sitting at edge of bed with modified independent assist to promote acute care out of bed activity           Sit to stand (Progressing)       Start:  02/18/25    Expected End:  03/04/25       Pt will transfer sit to standing position with modified independent assist and walker to promote safe out of bed activity              Safety       Safe Mobility Techniques (Progressing)       Start:  02/18/25    Expected End:  03/04/25       Pt will correctly identify and demonstrate safe mobility techniques to reduce their risks for falls during their acute care stay                   Education Documentation  Mobility Training, taught by Simon Hitchcock, PT at 2/18/2025  1:31 PM.  Learner: Patient  Readiness: Refuses  Method: Explanation, Demonstration  Response: No Evidence of Learning, Refused Teaching  Comment: safe mobility techniques, importance of OOB activity    Education Comments  No comments found.

## 2025-02-18 NOTE — CONSULTS
Inpatient consult to Infectious Diseases  Consult performed by: Flora Jiménez, APRN-CNP  Consult ordered by: Raghav Mcdonald MD  Reason for consult: left leg cellulitis, multiple painful, swollen joints          Primary MD: Emmie Castillo MD      History Of Present Illness  Jake Banerjee is a 69 y.o. male with past medical history of gout.  He presented to the emergency room due to bilateral lower extremity pain and difficulty ambulating.  He reports started on his right leg with knee and ankle pain.  Then progressed to left leg pain including knee and ankle.  Bilateral lower extremity edema left worse than right with left leg erythema and tenderness with palpation.  Left great toe with black discoloration.  He denies any recent injury or trauma.  He is afebrile, denies chills.  He did denies shortness of breath cough or chest pain.  He is on room air saturating 96%.  Labs with no leukocytosis.  Elevated C-reactive protein.  Blood cultures pending.  X-ray of ankle with mild bilateral soft tissue swelling, no evidence of acute osteomyelitis.  X-ray of knees show small right and moderate left subtalar joint effusions without fracture or signs of osteomyelitis.  Left tibia-fibula x-ray with no acute findings.  He was admitted for further evaluation and management.  He was started on Unasyn.       Past Medical History  He has a past medical history of Foreign body in cornea, left eye, initial encounter (11/02/2017).    Surgical History  He has a past surgical history that includes Hernia repair (10/18/2017).     Social History     Occupational History    Not on file   Tobacco Use    Smoking status: Never    Smokeless tobacco: Never   Vaping Use    Vaping status: Never Used   Substance and Sexual Activity    Alcohol use: Not Currently     Alcohol/week: 1.0 standard drink of alcohol     Types: 1 Cans of beer per week    Drug use: Not on file    Sexual activity: Not on file     Travel History   Travel since  01/18/25    No documented travel since 01/18/25            Family History  No family history on file.  Allergies  Sulfa (sulfonamide antibiotics)     Immunization History   Administered Date(s) Administered    Need Fixed SARS-CoV-2 Vaccination 10/29/2021    Pfizer COVID-19 vaccine, bivalent, age 12 years and older (30 mcg/0.3 mL) 01/23/2023     Medications  Home medications:  Medications Prior to Admission   Medication Sig Dispense Refill Last Dose/Taking    colchicine 0.6 mg tablet Take 2 tablets (1.2 mg) by mouth once daily as needed for muscle/joint pain.   Unknown    dexAMETHasone (Decadron) 2 mg tablet TAKE 3 TABLETS BY MOUTH ONE TIME DAILY WITH BREAKFAST 30 tablet 0      Current medications:  Scheduled medications  ampicillin-sulbactam, 3 g, intravenous, q6h  [START ON 2/19/2025] colchicine, 0.6 mg, oral, BID  enoxaparin, 40 mg, subcutaneous, q24h  influenza, 0.5 mL, intramuscular, During hospitalization  folic acid, 1 mg, oral, Daily  multivitamin with minerals, 1 tablet, oral, Daily  [START ON 2/19/2025] pantoprazole, 40 mg, oral, Daily before breakfast   Or  [START ON 2/19/2025] pantoprazole, 40 mg, intravenous, Daily before breakfast  polyethylene glycol, 17 g, oral, Daily  [START ON 2/21/2025] thiamine, 100 mg, oral, Daily  thiamine, 100 mg, intravenous, Daily      Continuous medications     PRN medications  PRN medications: acetaminophen, ibuprofen, oxyCODONE    Review of Systems   Constitutional: Negative.    HENT: Negative.     Eyes: Negative.    Respiratory: Negative.     Cardiovascular:  Positive for leg swelling.   Gastrointestinal: Negative.    Endocrine: Negative.    Genitourinary: Negative.    Musculoskeletal:  Positive for arthralgias and joint swelling.   Skin:  Positive for color change.   Neurological:  Positive for weakness.   Psychiatric/Behavioral: Negative.          Objective  Range of Vitals (last 24 hours)  Heart Rate:  []   Temp:  [36.6 °C (97.9 °F)-36.8 °C (98.2 °F)]   Resp:   "[15-16]   BP: (116-145)/(69-78)   Height:  [175.3 cm (5' 9\")]   Weight:  [70.3 kg (155 lb)]   SpO2:  [96 %-100 %]   Daily Weight  02/18/25 : 70.3 kg (155 lb)    Body mass index is 22.89 kg/m².     Physical Exam  Constitutional:       Appearance: Normal appearance.   HENT:      Head: Normocephalic and atraumatic.      Nose: Nose normal.      Mouth/Throat:      Mouth: Mucous membranes are moist.      Pharynx: Oropharynx is clear.   Eyes:      General: No scleral icterus.  Cardiovascular:      Rate and Rhythm: Normal rate and regular rhythm.   Pulmonary:      Effort: Pulmonary effort is normal.      Breath sounds: Normal breath sounds.   Abdominal:      General: Bowel sounds are normal.      Palpations: Abdomen is soft.   Musculoskeletal:         General: Normal range of motion.      Cervical back: Normal range of motion and neck supple.      Comments: Bilateral knee and ankle swelling  Right hallux swelling, mild erythema    Skin:     Comments: Left leg erythema, calor  Left hallux black discoloration under nail bed   Neurological:      General: No focal deficit present.      Mental Status: He is alert.   Psychiatric:         Mood and Affect: Mood normal.         Behavior: Behavior normal.          Relevant Results  Outside Hospital Results  No  Labs  Results from last 72 hours   Lab Units 02/18/25  0911   WBC AUTO x10*3/uL 9.5   HEMOGLOBIN g/dL 10.8*   HEMATOCRIT % 31.8*   PLATELETS AUTO x10*3/uL 128*   NEUTROS PCT AUTO % 78.1   LYMPHS PCT AUTO % 6.6   MONOS PCT AUTO % 14.5   EOS PCT AUTO % 0.2     Results from last 72 hours   Lab Units 02/18/25  0911   SODIUM mmol/L 135*   POTASSIUM mmol/L 3.9   CHLORIDE mmol/L 102   CO2 mmol/L 25   BUN mg/dL 16   CREATININE mg/dL 0.80   GLUCOSE mg/dL 139*   CALCIUM mg/dL 9.1   ANION GAP mmol/L 12   EGFR mL/min/1.73m*2 >90     Results from last 72 hours   Lab Units 02/18/25  1459   ALK PHOS U/L 92   BILIRUBIN TOTAL mg/dL 4.4*   BILIRUBIN DIRECT mg/dL 1.2*   PROTEIN TOTAL g/dL 8.1 " "  ALT U/L 23   AST U/L 37   ALBUMIN g/dL 3.8     Estimated Creatinine Clearance: 89.1 mL/min (by C-G formula based on SCr of 0.8 mg/dL).  C-Reactive Protein   Date Value Ref Range Status   02/18/2025 10.91 (H) <1.00 mg/dL Final     CRP   Date Value Ref Range Status   09/06/2023 6.4 (H) 0 - 2.0 MG/DL Final     Comment:     Performed at [address] [address]     Sedimentation Rate   Date Value Ref Range Status   02/18/2025 97 (H) 0 - 20 mm/h Final   09/06/2023 25 (H) 0 - 20 MM/HR Final     Comment:     Performed at [address] [address]     No results found for: \"HIV1X2\", \"HIVCONF\", \"GBNMBL6EN\"  No results found for: \"HEPCABINIT\", \"HEPCAB\", \"HCVPCRQUANT\"  Microbiology  Blood culture pending       Imaging  XR knee 4+ views bilateral    Result Date: 2/18/2025  Interpreted By:  Flavio Arceo, STUDY: XR KNEE 4+ VIEWS BILATERAL; 2/18/2025 4:14 pm   INDICATION: Signs/Symptoms:Pain and swelling both knees   COMPARISON: None.   ACCESSION NUMBER(S): [id number]   ORDERING CLINICIAN: DEVANG ROMERO   TECHNIQUE: Number of films: 8 view radiographs of the knees bilaterally.   FINDINGS: No fractures or destructive lesions are identified. The joint spaces and articular surfaces are maintained. The alignment is anatomic. The soft tissues are unremarkable. There is small right and moderate right suprapatellar joint effusion.       Small right and moderate left suprapatellar joint effusions without underlying fracture or subluxation. No radiographic evidence of acute osteomyelitis.   Signed by: Flavio Arceo 2/18/2025 4:57 PM Dictation workstation:   [id number]    XR ankle bilateral complete minimum 3 views    Result Date: 2/18/2025  Interpreted By:  Flavio Arceo, STUDY: XR ANKLE BILATERAL COMPLETE MINIMUM 3 VIEWS 2/18/2025 4:14 pm   INDICATION: Signs/Symptoms:Rule out arthritis vs infection Injury/Pain   COMPARISON: Left knee from 2017   ACCESSION NUMBER(S): [id number]   ORDERING " CLINICIAN: DEVANG ROMERO   TECHNIQUE: Number of films: 6 view radiographs of the ankles bilaterally.   FINDINGS: There is no fracture, blastic or lytic bone lesion. No abnormal periosteal reaction is identified. The ankle mortise and subtalar joints are intact. The alignment is anatomic. Small calcaneal spurs are seen bilaterally. Mild bilateral soft tissue swelling is noted, without abnormal gas collections or radiopaque foreign bodies.       Mild bilateral soft tissue swelling. No radiographic evidence of acute osteomyelitis.   Signed by: Flavio Arceo 2/18/2025 4:55 PM Dictation workstation:   [id number]    Vascular US Lower Extremity Venous Duplex Bilateral    Result Date: 2/18/2025  Interpreted By:  Vamsi Krishna, STUDY: [facility] US LOWER EXTREMITY VENOUS DUPLEX BILATERAL  2/18/2025 3:55 pm   INDICATION: Swelling and pain for 5 days.   ,R60.9 Edema, unspecified   COMPARISON: 09/06/2023.   ACCESSION NUMBER(S): [id number]   ORDERING CLINICIAN: DEVANG ROMERO   TECHNIQUE: Routine ultrasound of the  bilateral lower extremity was performed with duplex Doppler (color and spectral) evaluation.  Static images were obtained for remote interpretation.   FINDINGS: THIGH VEINS: The  bilateral common femoral, femoral, popliteal, proximal medial saphenous, and deep femoral veins are patent and free of thrombus. The veins are normally compressible.  They demonstrate normal phasic flow and augmentation response.   Within the right popliteal fossa there is a small hypoechoic cystic lesion or fluid collection measuring 1.4 x 0.5 x 0.6 cm.   CALF VEINS: The paired peroneal and posterior tibial calf veins are patent.         No evidence of deep venous thrombosis of the  bilateral lower extremity.   Nonspecific small hypoechoic cystic lesion or fluid collection of the right popliteal fossa.       MACRO: None.   Signed by: Vamsi Krishna 2/18/2025 4:01 PM Dictation workstation:   [id number]    XR tibia fibula  left 2 views    Result Date: 2/18/2025  Interpreted By:  Ruddy Gage, STUDY: XR TIBIA FIBULA LEFT 2 VIEWS; 2/18/2025 9:32 am   INDICATION: Signs/Symptoms:swelling, pain.   COMPARISON: None.   ACCESSION NUMBER(S): [id number]   ORDERING CLINICIAN: BIPIN DE LA GARZA   TECHNIQUE: Left tib fib two views   FINDINGS: No fractures or destructive lesions are identified. There is a small plantar calcaneal spur incidentally noted.       No acute pathologic findings are identified.   MACRO: none   Signed by: Ruddy Gage 2/18/2025 10:08 AM Dictation workstation:   [id number]     Assessment/Plan   Left leg cellulitis   Bilateral knee and ankle swelling/pain-gout, inflammation verses infection       IV Unasyn  IV vancomycin   Follow blood cultures  Bilateral foot x-ray  Monitor temperature and WBC  Supportive care  Orthopedic consulted  Podiatry consulted  Further recommendations based on workup    Total time spent caring for the patient today was 35 minutes. This includes time spent before the visit reviewing the chart, time spent during the visit, and time spent after the visit on documentation.       Flora Jiménez, APRN-CNP

## 2025-02-18 NOTE — CONSULTS
"Consults    Reason For Consult  Bilateral knee swelling    History Of Present Illness  Jake Banerjee is a 69 y.o. male presenting with lower extremity symptoms including swelling in his knees.  He has a history of gout and is prone to frequent flareups.  At the present time he reports no significant pain in his knees and reports that they are \" getting better.\"     Past Medical History  He has a past medical history of Foreign body in cornea, left eye, initial encounter (11/02/2017).    Surgical History  He has a past surgical history that includes Hernia repair (10/18/2017).     Social History  He reports that he has never smoked. He has never used smokeless tobacco. He reports that he does not currently use alcohol after a past usage of about 1.0 standard drink of alcohol per week. No history on file for drug use.    Family History  No family history on file.     Allergies  Sulfa (sulfonamide antibiotics)    Review of Systems     Physical Exam examination of his knees bilateral demonstrate minimal swelling perhaps a slight effusion on the left side.  Range of motion is quite ample with minimal pain.     Last Recorded Vitals  Blood pressure 116/71, pulse 83, temperature 36.6 °C (97.9 °F), temperature source Temporal, resp. rate 16, height 1.753 m (5' 9\"), weight 70.3 kg (155 lb), SpO2 96%.    Relevant Results      Scheduled medications  ampicillin-sulbactam, 3 g, intravenous, q6h  [START ON 2/19/2025] colchicine, 0.6 mg, oral, BID  enoxaparin, 40 mg, subcutaneous, q24h  influenza, 0.5 mL, intramuscular, During hospitalization  folic acid, 1 mg, oral, Daily  multivitamin with minerals, 1 tablet, oral, Daily  [START ON 2/19/2025] pantoprazole, 40 mg, oral, Daily before breakfast   Or  [START ON 2/19/2025] pantoprazole, 40 mg, intravenous, Daily before breakfast  polyethylene glycol, 17 g, oral, Daily  [START ON 2/21/2025] thiamine, 100 mg, oral, Daily  thiamine, 100 mg, intravenous, Daily      Continuous medications   "   PRN medications  PRN medications: acetaminophen, ibuprofen, oxyCODONE, vancomycin  Results for orders placed or performed during the hospital encounter of 02/18/25 (from the past 24 hours)   CBC and Auto Differential   Result Value Ref Range    WBC 9.5 4.4 - 11.3 x10*3/uL    nRBC 0.0 0.0 - 0.0 /100 WBCs    RBC 2.92 (L) 4.50 - 5.90 x10*6/uL    Hemoglobin 10.8 (L) 13.5 - 17.5 g/dL    Hematocrit 31.8 (L) 41.0 - 52.0 %     (H) 80 - 100 fL    MCH 37.0 (H) 26.0 - 34.0 pg    MCHC 34.0 32.0 - 36.0 g/dL    RDW 15.5 (H) 11.5 - 14.5 %    Platelets 128 (L) 150 - 450 x10*3/uL    Neutrophils % 78.1 40.0 - 80.0 %    Immature Granulocytes %, Automated 0.3 0.0 - 0.9 %    Lymphocytes % 6.6 13.0 - 44.0 %    Monocytes % 14.5 2.0 - 10.0 %    Eosinophils % 0.2 0.0 - 6.0 %    Basophils % 0.3 0.0 - 2.0 %    Neutrophils Absolute 7.43 1.20 - 7.70 x10*3/uL    Immature Granulocytes Absolute, Automated 0.03 0.00 - 0.70 x10*3/uL    Lymphocytes Absolute 0.63 (L) 1.20 - 4.80 x10*3/uL    Monocytes Absolute 1.38 (H) 0.10 - 1.00 x10*3/uL    Eosinophils Absolute 0.02 0.00 - 0.70 x10*3/uL    Basophils Absolute 0.03 0.00 - 0.10 x10*3/uL   Basic Metabolic Panel   Result Value Ref Range    Glucose 139 (H) 74 - 99 mg/dL    Sodium 135 (L) 136 - 145 mmol/L    Potassium 3.9 3.5 - 5.3 mmol/L    Chloride 102 98 - 107 mmol/L    Bicarbonate 25 21 - 32 mmol/L    Anion Gap 12 10 - 20 mmol/L    Urea Nitrogen 16 6 - 23 mg/dL    Creatinine 0.80 0.50 - 1.30 mg/dL    eGFR >90 >60 mL/min/1.73m*2    Calcium 9.1 8.6 - 10.3 mg/dL   Type And Screen   Result Value Ref Range    ABO TYPE A     Rh TYPE POS     ANTIBODY SCREEN NEG    Protime-INR   Result Value Ref Range    Protime 12.8 (H) 9.3 - 12.7 seconds    INR 1.2 0.9 - 1.2   Sedimentation rate, automated   Result Value Ref Range    Sedimentation Rate 97 (H) 0 - 20 mm/h   Magnesium   Result Value Ref Range    Magnesium 2.26 1.60 - 2.40 mg/dL   Alcohol   Result Value Ref Range    Alcohol <10 <=10 mg/dL   Hepatic  function panel   Result Value Ref Range    Albumin 3.8 3.4 - 5.0 g/dL    Bilirubin, Total 4.4 (H) 0.0 - 1.2 mg/dL    Bilirubin, Direct 1.2 (H) 0.0 - 0.3 mg/dL    Alkaline Phosphatase 92 33 - 136 U/L    ALT 23 10 - 52 U/L    AST 37 9 - 39 U/L    Total Protein 8.1 6.4 - 8.2 g/dL   C-reactive protein   Result Value Ref Range    C-Reactive Protein 10.91 (H) <1.00 mg/dL        Assessment/Plan     I had a lengthy discussion with the patient regarding my pressure of his findings.  Do not see any active process involving his knees.  He may be dealing with some mild effusions based upon his history of gout but it I do not suspect an infectious process.  It appears that he is dealing with more pressing issues in his lower leg foot and ankle area and is being evaluated podiatry.  There is no indication for orthopedic intervention at this time.  Zev Chamberlain MD

## 2025-02-18 NOTE — PROGRESS NOTES
Pharmacy Medication History Review    Jake Banerjee is a 69 y.o. male admitted for Acute idiopathic gout of multiple sites. Pharmacy reviewed the patient's nyrbr-ik-vinslesrg medications and allergies for accuracy.    Medications ADDED:  N/A  Medications CHANGED:  N/A  Medications REMOVED:   N/A     The list below reflects the updated PTA list.   Prior to Admission Medications   Prescriptions Last Dose Informant Patient Reported? Taking?   colchicine 0.6 mg tablet Unknown  Yes Yes   Sig: Take 2 tablets (1.2 mg) by mouth once daily as needed for muscle/joint pain.   dexAMETHasone (Decadron) 2 mg tablet   No No   Sig: TAKE 3 TABLETS BY MOUTH ONE TIME DAILY WITH BREAKFAST      Facility-Administered Medications: None        The list below reflects the updated allergy list. Please review each documented allergy for additional clarification and justification.  Allergies  Reviewed by Lester Tucker on 2/18/2025        Severity Reactions Comments    Sulfa (sulfonamide Antibiotics) Low Rash             Patient accepts M2B at discharge.     Sources:   Pharmacy dispense history  Patient interview Poor historian  Chart Review  Care Everywhere     Additional Comments:  Patient stated he ran out of Colchicine at home and haven't had it refilled. Patient takes medication as needed. When taken patient takes 2 tabs for a total of 1.2 mg and an additional .6 mg later in the day.      Lester Tucker  Pharmacy Technician  02/18/25     Secure Chat preferred

## 2025-02-19 ENCOUNTER — APPOINTMENT (OUTPATIENT)
Dept: RADIOLOGY | Facility: HOSPITAL | Age: 70
End: 2025-02-19
Payer: MEDICAID

## 2025-02-19 PROBLEM — E80.6 HYPERBILIRUBINEMIA: Status: ACTIVE | Noted: 2025-02-19

## 2025-02-19 PROBLEM — D64.89 OTHER SPECIFIED ANEMIAS: Status: ACTIVE | Noted: 2025-02-19

## 2025-02-19 LAB
ALBUMIN SERPL BCP-MCNC: 3 G/DL (ref 3.4–5)
ALP SERPL-CCNC: 91 U/L (ref 33–136)
ALT SERPL W P-5'-P-CCNC: 20 U/L (ref 10–52)
AMMONIA PLAS-SCNC: 20 UMOL/L (ref 16–53)
AMPHETAMINES UR QL SCN: ABNORMAL
ANION GAP SERPL CALCULATED.3IONS-SCNC: 10 MMOL/L (ref 10–20)
APPEARANCE UR: CLEAR
ASO AB SERPL-ACNC: 502 IU/ML (ref 0–250)
AST SERPL W P-5'-P-CCNC: 37 U/L (ref 9–39)
BARBITURATES UR QL SCN: ABNORMAL
BASOPHILS # BLD AUTO: 0.04 X10*3/UL (ref 0–0.1)
BASOPHILS NFR BLD AUTO: 0.5 %
BENZODIAZ UR QL SCN: ABNORMAL
BILIRUB SERPL-MCNC: 1.7 MG/DL (ref 0–1.2)
BILIRUB UR STRIP.AUTO-MCNC: NEGATIVE MG/DL
BUN SERPL-MCNC: 19 MG/DL (ref 6–23)
BZE UR QL SCN: ABNORMAL
CALCIUM SERPL-MCNC: 8.7 MG/DL (ref 8.6–10.3)
CANNABINOIDS UR QL SCN: ABNORMAL
CHLORIDE SERPL-SCNC: 104 MMOL/L (ref 98–107)
CO2 SERPL-SCNC: 26 MMOL/L (ref 21–32)
COLOR UR: ABNORMAL
CREAT SERPL-MCNC: 0.73 MG/DL (ref 0.5–1.3)
EGFRCR SERPLBLD CKD-EPI 2021: >90 ML/MIN/1.73M*2
EOSINOPHIL # BLD AUTO: 0.11 X10*3/UL (ref 0–0.7)
EOSINOPHIL NFR BLD AUTO: 1.3 %
ERYTHROCYTE [DISTWIDTH] IN BLOOD BY AUTOMATED COUNT: 15.4 % (ref 11.5–14.5)
EST. AVERAGE GLUCOSE BLD GHB EST-MCNC: 82 MG/DL
FENTANYL+NORFENTANYL UR QL SCN: ABNORMAL
GLUCOSE SERPL-MCNC: 113 MG/DL (ref 74–99)
GLUCOSE UR STRIP.AUTO-MCNC: NORMAL MG/DL
HBA1C MFR BLD: 4.5 %
HCT VFR BLD AUTO: 29.2 % (ref 41–52)
HGB BLD-MCNC: 9.5 G/DL (ref 13.5–17.5)
HGB RETIC QN: 35 PG (ref 28–38)
IMM GRANULOCYTES # BLD AUTO: 0.04 X10*3/UL (ref 0–0.7)
IMM GRANULOCYTES NFR BLD AUTO: 0.5 % (ref 0–0.9)
IMMATURE RETIC FRACTION: 21.8 %
KETONES UR STRIP.AUTO-MCNC: NEGATIVE MG/DL
LDH SERPL L TO P-CCNC: 106 U/L (ref 84–246)
LEUKOCYTE ESTERASE UR QL STRIP.AUTO: NEGATIVE
LYMPHOCYTES # BLD AUTO: 0.83 X10*3/UL (ref 1.2–4.8)
LYMPHOCYTES NFR BLD AUTO: 10 %
MAGNESIUM SERPL-MCNC: 2.01 MG/DL (ref 1.6–2.4)
MCH RBC QN AUTO: 36 PG (ref 26–34)
MCHC RBC AUTO-ENTMCNC: 32.5 G/DL (ref 32–36)
MCV RBC AUTO: 111 FL (ref 80–100)
METHADONE UR QL SCN: ABNORMAL
MONOCYTES # BLD AUTO: 1.09 X10*3/UL (ref 0.1–1)
MONOCYTES NFR BLD AUTO: 13.1 %
MUCOUS THREADS #/AREA URNS AUTO: NORMAL /LPF
NEUTROPHILS # BLD AUTO: 6.18 X10*3/UL (ref 1.2–7.7)
NEUTROPHILS NFR BLD AUTO: 74.6 %
NITRITE UR QL STRIP.AUTO: NEGATIVE
NRBC BLD-RTO: 0 /100 WBCS (ref 0–0)
OPIATES UR QL SCN: ABNORMAL
OXYCODONE+OXYMORPHONE UR QL SCN: ABNORMAL
PCP UR QL SCN: ABNORMAL
PH UR STRIP.AUTO: 5.5 [PH]
PLATELET # BLD AUTO: 107 X10*3/UL (ref 150–450)
POTASSIUM SERPL-SCNC: 3.7 MMOL/L (ref 3.5–5.3)
PROT SERPL-MCNC: 6.4 G/DL (ref 6.4–8.2)
PROT UR STRIP.AUTO-MCNC: ABNORMAL MG/DL
RBC # BLD AUTO: 2.64 X10*6/UL (ref 4.5–5.9)
RBC # UR STRIP.AUTO: ABNORMAL MG/DL
RBC #/AREA URNS AUTO: NORMAL /HPF
RETICS #: 0.07 X10*6/UL (ref 0.02–0.11)
RETICS/RBC NFR AUTO: 2.9 % (ref 0.5–2)
SODIUM SERPL-SCNC: 136 MMOL/L (ref 136–145)
SP GR UR STRIP.AUTO: 1.03
TSH SERPL-ACNC: 1.57 MIU/L (ref 0.44–3.98)
UROBILINOGEN UR STRIP.AUTO-MCNC: NORMAL MG/DL
VANCOMYCIN SERPL-MCNC: 12.7 UG/ML (ref 5–20)
VIT B12 SERPL-MCNC: 308 PG/ML (ref 211–911)
WBC # BLD AUTO: 8.3 X10*3/UL (ref 4.4–11.3)
WBC #/AREA URNS AUTO: NORMAL /HPF

## 2025-02-19 PROCEDURE — 85045 AUTOMATED RETICULOCYTE COUNT: CPT | Performed by: INTERNAL MEDICINE

## 2025-02-19 PROCEDURE — 85025 COMPLETE CBC W/AUTO DIFF WBC: CPT | Performed by: INTERNAL MEDICINE

## 2025-02-19 PROCEDURE — 72141 MRI NECK SPINE W/O DYE: CPT | Performed by: RADIOLOGY

## 2025-02-19 PROCEDURE — 76705 ECHO EXAM OF ABDOMEN: CPT

## 2025-02-19 PROCEDURE — 2500000001 HC RX 250 WO HCPCS SELF ADMINISTERED DRUGS (ALT 637 FOR MEDICARE OP): Performed by: INTERNAL MEDICINE

## 2025-02-19 PROCEDURE — 97530 THERAPEUTIC ACTIVITIES: CPT | Mod: GO

## 2025-02-19 PROCEDURE — 36415 COLL VENOUS BLD VENIPUNCTURE: CPT | Performed by: NURSE PRACTITIONER

## 2025-02-19 PROCEDURE — 36415 COLL VENOUS BLD VENIPUNCTURE: CPT | Performed by: INTERNAL MEDICINE

## 2025-02-19 PROCEDURE — 99232 SBSQ HOSP IP/OBS MODERATE 35: CPT | Performed by: INTERNAL MEDICINE

## 2025-02-19 PROCEDURE — 70551 MRI BRAIN STEM W/O DYE: CPT

## 2025-02-19 PROCEDURE — G0378 HOSPITAL OBSERVATION PER HR: HCPCS

## 2025-02-19 PROCEDURE — 97165 OT EVAL LOW COMPLEX 30 MIN: CPT | Mod: GO

## 2025-02-19 PROCEDURE — 70551 MRI BRAIN STEM W/O DYE: CPT | Performed by: RADIOLOGY

## 2025-02-19 PROCEDURE — 80202 ASSAY OF VANCOMYCIN: CPT

## 2025-02-19 PROCEDURE — 84443 ASSAY THYROID STIM HORMONE: CPT | Performed by: NURSE PRACTITIONER

## 2025-02-19 PROCEDURE — 82140 ASSAY OF AMMONIA: CPT | Performed by: NURSE PRACTITIONER

## 2025-02-19 PROCEDURE — 72141 MRI NECK SPINE W/O DYE: CPT

## 2025-02-19 PROCEDURE — 76705 ECHO EXAM OF ABDOMEN: CPT | Performed by: RADIOLOGY

## 2025-02-19 PROCEDURE — 2500000004 HC RX 250 GENERAL PHARMACY W/ HCPCS (ALT 636 FOR OP/ED)

## 2025-02-19 PROCEDURE — 96372 THER/PROPH/DIAG INJ SC/IM: CPT | Performed by: INTERNAL MEDICINE

## 2025-02-19 PROCEDURE — 83735 ASSAY OF MAGNESIUM: CPT | Performed by: INTERNAL MEDICINE

## 2025-02-19 PROCEDURE — 82607 VITAMIN B-12: CPT | Mod: TRILAB | Performed by: NURSE PRACTITIONER

## 2025-02-19 PROCEDURE — 80053 COMPREHEN METABOLIC PANEL: CPT | Performed by: INTERNAL MEDICINE

## 2025-02-19 PROCEDURE — 2500000004 HC RX 250 GENERAL PHARMACY W/ HCPCS (ALT 636 FOR OP/ED): Performed by: INTERNAL MEDICINE

## 2025-02-19 PROCEDURE — 83615 LACTATE (LD) (LDH) ENZYME: CPT | Performed by: INTERNAL MEDICINE

## 2025-02-19 PROCEDURE — 83010 ASSAY OF HAPTOGLOBIN QUANT: CPT | Mod: TRILAB | Performed by: INTERNAL MEDICINE

## 2025-02-19 RX ADMIN — FOLIC ACID 1 MG: 1 TABLET ORAL at 08:57

## 2025-02-19 RX ADMIN — VANCOMYCIN 1750 MG: 1.25 INJECTION, SOLUTION INTRAVENOUS at 18:31

## 2025-02-19 RX ADMIN — AMPICILLIN SODIUM AND SULBACTAM SODIUM 3 G: 2; 1 INJECTION, POWDER, FOR SOLUTION INTRAMUSCULAR; INTRAVENOUS at 23:25

## 2025-02-19 RX ADMIN — ENOXAPARIN SODIUM 40 MG: 40 INJECTION SUBCUTANEOUS at 14:09

## 2025-02-19 RX ADMIN — OXYCODONE HYDROCHLORIDE 5 MG: 5 TABLET ORAL at 14:09

## 2025-02-19 RX ADMIN — AMPICILLIN SODIUM AND SULBACTAM SODIUM 3 G: 2; 1 INJECTION, POWDER, FOR SOLUTION INTRAMUSCULAR; INTRAVENOUS at 12:21

## 2025-02-19 RX ADMIN — COLCHICINE 0.6 MG: 0.6 TABLET, FILM COATED ORAL at 08:57

## 2025-02-19 RX ADMIN — COLCHICINE 0.6 MG: 0.6 TABLET, FILM COATED ORAL at 20:56

## 2025-02-19 RX ADMIN — OXYCODONE HYDROCHLORIDE 5 MG: 5 TABLET ORAL at 09:00

## 2025-02-19 RX ADMIN — OXYCODONE HYDROCHLORIDE 5 MG: 5 TABLET ORAL at 22:18

## 2025-02-19 RX ADMIN — PANTOPRAZOLE SODIUM 40 MG: 40 TABLET, DELAYED RELEASE ORAL at 06:14

## 2025-02-19 RX ADMIN — THIAMINE HYDROCHLORIDE 100 MG: 100 INJECTION, SOLUTION INTRAMUSCULAR; INTRAVENOUS at 08:57

## 2025-02-19 RX ADMIN — Medication 1 TABLET: at 08:57

## 2025-02-19 RX ADMIN — ACETAMINOPHEN 650 MG: 325 TABLET, FILM COATED ORAL at 22:18

## 2025-02-19 RX ADMIN — AMPICILLIN SODIUM AND SULBACTAM SODIUM 3 G: 2; 1 INJECTION, POWDER, FOR SOLUTION INTRAMUSCULAR; INTRAVENOUS at 05:08

## 2025-02-19 RX ADMIN — AMPICILLIN SODIUM AND SULBACTAM SODIUM 3 G: 2; 1 INJECTION, POWDER, FOR SOLUTION INTRAMUSCULAR; INTRAVENOUS at 17:13

## 2025-02-19 SDOH — SOCIAL STABILITY: SOCIAL INSECURITY: ARE YOU MARRIED, WIDOWED, DIVORCED, SEPARATED, NEVER MARRIED, OR LIVING WITH A PARTNER?: PATIENT DECLINED

## 2025-02-19 SDOH — HEALTH STABILITY: MENTAL HEALTH
DO YOU FEEL STRESS - TENSE, RESTLESS, NERVOUS, OR ANXIOUS, OR UNABLE TO SLEEP AT NIGHT BECAUSE YOUR MIND IS TROUBLED ALL THE TIME - THESE DAYS?: NOT AT ALL

## 2025-02-19 SDOH — SOCIAL STABILITY: SOCIAL NETWORK
DO YOU BELONG TO ANY CLUBS OR ORGANIZATIONS SUCH AS CHURCH GROUPS, UNIONS, FRATERNAL OR ATHLETIC GROUPS, OR SCHOOL GROUPS?: PATIENT DECLINED

## 2025-02-19 SDOH — HEALTH STABILITY: PHYSICAL HEALTH: ON AVERAGE, HOW MANY DAYS PER WEEK DO YOU ENGAGE IN MODERATE TO STRENUOUS EXERCISE (LIKE A BRISK WALK)?: 0 DAYS

## 2025-02-19 SDOH — HEALTH STABILITY: PHYSICAL HEALTH: ON AVERAGE, HOW MANY MINUTES DO YOU ENGAGE IN EXERCISE AT THIS LEVEL?: 0 MIN

## 2025-02-19 SDOH — SOCIAL STABILITY: SOCIAL NETWORK: HOW OFTEN DO YOU ATTEND CHURCH OR RELIGIOUS SERVICES?: PATIENT DECLINED

## 2025-02-19 SDOH — SOCIAL STABILITY: SOCIAL NETWORK: HOW OFTEN DO YOU ATTEND MEETINGS OF THE CLUBS OR ORGANIZATIONS YOU BELONG TO?: PATIENT DECLINED

## 2025-02-19 SDOH — SOCIAL STABILITY: SOCIAL NETWORK
IN A TYPICAL WEEK, HOW MANY TIMES DO YOU TALK ON THE PHONE WITH FAMILY, FRIENDS, OR NEIGHBORS?: MORE THAN THREE TIMES A WEEK

## 2025-02-19 SDOH — HEALTH STABILITY: PHYSICAL HEALTH
HOW OFTEN DO YOU NEED TO HAVE SOMEONE HELP YOU WHEN YOU READ INSTRUCTIONS, PAMPHLETS, OR OTHER WRITTEN MATERIAL FROM YOUR DOCTOR OR PHARMACY?: NEVER

## 2025-02-19 SDOH — SOCIAL STABILITY: SOCIAL NETWORK: HOW OFTEN DO YOU GET TOGETHER WITH FRIENDS OR RELATIVES?: MORE THAN THREE TIMES A WEEK

## 2025-02-19 ASSESSMENT — LIFESTYLE VARIABLES
HEADACHE, FULLNESS IN HEAD: NOT PRESENT
TOTAL SCORE: 0
NAUSEA AND VOMITING: NO NAUSEA AND NO VOMITING
VISUAL DISTURBANCES: NOT PRESENT
PAROXYSMAL SWEATS: NO SWEAT VISIBLE
NAUSEA AND VOMITING: NO NAUSEA AND NO VOMITING
ANXIETY: NO ANXIETY, AT EASE
AGITATION: NORMAL ACTIVITY
TREMOR: NO TREMOR
ORIENTATION AND CLOUDING OF SENSORIUM: ORIENTED AND CAN DO SERIAL ADDITIONS
NAUSEA AND VOMITING: NO NAUSEA AND NO VOMITING
HEADACHE, FULLNESS IN HEAD: NOT PRESENT
AUDITORY DISTURBANCES: NOT PRESENT
AGITATION: SOMEWHAT MORE THAN NORMAL ACTIVITY
NAUSEA AND VOMITING: NO NAUSEA AND NO VOMITING
ANXIETY: NO ANXIETY, AT EASE
TOTAL SCORE: 0
TOTAL SCORE: 0
NAUSEA AND VOMITING: NO NAUSEA AND NO VOMITING
AUDITORY DISTURBANCES: NOT PRESENT
HEADACHE, FULLNESS IN HEAD: NOT PRESENT
HEADACHE, FULLNESS IN HEAD: NOT PRESENT
VISUAL DISTURBANCES: NOT PRESENT
TREMOR: NO TREMOR
AUDITORY DISTURBANCES: NOT PRESENT
ORIENTATION AND CLOUDING OF SENSORIUM: ORIENTED AND CAN DO SERIAL ADDITIONS
AUDITORY DISTURBANCES: NOT PRESENT
HEADACHE, FULLNESS IN HEAD: NOT PRESENT
AGITATION: NORMAL ACTIVITY
ANXIETY: NO ANXIETY, AT EASE
ORIENTATION AND CLOUDING OF SENSORIUM: ORIENTED AND CAN DO SERIAL ADDITIONS
AGITATION: NORMAL ACTIVITY
PAROXYSMAL SWEATS: NO SWEAT VISIBLE
NAUSEA AND VOMITING: NO NAUSEA AND NO VOMITING
ORIENTATION AND CLOUDING OF SENSORIUM: ORIENTED AND CAN DO SERIAL ADDITIONS
AUDITORY DISTURBANCES: NOT PRESENT
PAROXYSMAL SWEATS: NO SWEAT VISIBLE
TOTAL SCORE: 0
TOTAL SCORE: 2
TREMOR: NO TREMOR
HEADACHE, FULLNESS IN HEAD: NOT PRESENT
AGITATION: NORMAL ACTIVITY
ORIENTATION AND CLOUDING OF SENSORIUM: ORIENTED AND CAN DO SERIAL ADDITIONS
HEADACHE, FULLNESS IN HEAD: NOT PRESENT
TREMOR: NO TREMOR
NAUSEA AND VOMITING: NO NAUSEA AND NO VOMITING
VISUAL DISTURBANCES: NOT PRESENT
AGITATION: NORMAL ACTIVITY
TREMOR: NOT VISIBLE, BUT CAN BE FELT FINGERTIP TO FINGERTIP
PAROXYSMAL SWEATS: NO SWEAT VISIBLE
TREMOR: NO TREMOR
ANXIETY: NO ANXIETY, AT EASE
PAROXYSMAL SWEATS: NO SWEAT VISIBLE
TOTAL SCORE: 0
PAROXYSMAL SWEATS: NO SWEAT VISIBLE
ANXIETY: NO ANXIETY, AT EASE
VISUAL DISTURBANCES: NOT PRESENT
AUDITORY DISTURBANCES: NOT PRESENT
HEADACHE, FULLNESS IN HEAD: NOT PRESENT
AGITATION: NORMAL ACTIVITY
ANXIETY: NO ANXIETY, AT EASE
TOTAL SCORE: 0
HEADACHE, FULLNESS IN HEAD: NOT PRESENT
ORIENTATION AND CLOUDING OF SENSORIUM: ORIENTED AND CAN DO SERIAL ADDITIONS
NAUSEA AND VOMITING: NO NAUSEA AND NO VOMITING
PAROXYSMAL SWEATS: NO SWEAT VISIBLE
AGITATION: NORMAL ACTIVITY
VISUAL DISTURBANCES: NOT PRESENT
ANXIETY: NO ANXIETY, AT EASE
AUDITORY DISTURBANCES: NOT PRESENT
ORIENTATION AND CLOUDING OF SENSORIUM: ORIENTED AND CAN DO SERIAL ADDITIONS
TREMOR: NO TREMOR
TOTAL SCORE: 0
ANXIETY: NO ANXIETY, AT EASE
ORIENTATION AND CLOUDING OF SENSORIUM: ORIENTED AND CAN DO SERIAL ADDITIONS
PAROXYSMAL SWEATS: NO SWEAT VISIBLE
VISUAL DISTURBANCES: NOT PRESENT
AGITATION: NORMAL ACTIVITY
TOTAL SCORE: 0
NAUSEA AND VOMITING: NO NAUSEA AND NO VOMITING
TREMOR: NO TREMOR
VISUAL DISTURBANCES: NOT PRESENT
ORIENTATION AND CLOUDING OF SENSORIUM: ORIENTED AND CAN DO SERIAL ADDITIONS
PAROXYSMAL SWEATS: NO SWEAT VISIBLE
ANXIETY: NO ANXIETY, AT EASE
TREMOR: NO TREMOR

## 2025-02-19 ASSESSMENT — COGNITIVE AND FUNCTIONAL STATUS - GENERAL
HELP NEEDED FOR BATHING: A LOT
MOVING FROM LYING ON BACK TO SITTING ON SIDE OF FLAT BED WITH BEDRAILS: A LITTLE
PERSONAL GROOMING: A LITTLE
STANDING UP FROM CHAIR USING ARMS: A LOT
MOBILITY SCORE: 13
CLIMB 3 TO 5 STEPS WITH RAILING: TOTAL
TOILETING: A LOT
HELP NEEDED FOR BATHING: A LOT
STANDING UP FROM CHAIR USING ARMS: A LOT
TOILETING: TOTAL
WALKING IN HOSPITAL ROOM: A LOT
DRESSING REGULAR LOWER BODY CLOTHING: TOTAL
TOILETING: A LOT
EATING MEALS: A LITTLE
PERSONAL GROOMING: A LITTLE
MOVING FROM LYING ON BACK TO SITTING ON SIDE OF FLAT BED WITH BEDRAILS: A LITTLE
TURNING FROM BACK TO SIDE WHILE IN FLAT BAD: A LITTLE
DRESSING REGULAR LOWER BODY CLOTHING: A LITTLE
PERSONAL GROOMING: A LITTLE
TURNING FROM BACK TO SIDE WHILE IN FLAT BAD: A LITTLE
MOVING TO AND FROM BED TO CHAIR: A LOT
MOVING TO AND FROM BED TO CHAIR: A LOT
HELP NEEDED FOR BATHING: A LOT
DRESSING REGULAR UPPER BODY CLOTHING: A LITTLE
DRESSING REGULAR UPPER BODY CLOTHING: A LITTLE
CLIMB 3 TO 5 STEPS WITH RAILING: TOTAL
WALKING IN HOSPITAL ROOM: A LOT
DRESSING REGULAR LOWER BODY CLOTHING: A LITTLE
DAILY ACTIVITIY SCORE: 17
DAILY ACTIVITIY SCORE: 13
DRESSING REGULAR UPPER BODY CLOTHING: A LITTLE

## 2025-02-19 ASSESSMENT — PAIN DESCRIPTION - DESCRIPTORS
DESCRIPTORS: THROBBING
DESCRIPTORS: THROBBING
DESCRIPTORS: PRESSURE

## 2025-02-19 ASSESSMENT — ACTIVITIES OF DAILY LIVING (ADL)
BATHING_ASSISTANCE: MODERATE
ADL_ASSISTANCE: INDEPENDENT

## 2025-02-19 ASSESSMENT — PAIN DESCRIPTION - LOCATION
LOCATION: KNEE
LOCATION: LEG
LOCATION: LEG

## 2025-02-19 ASSESSMENT — PAIN - FUNCTIONAL ASSESSMENT
PAIN_FUNCTIONAL_ASSESSMENT: 0-10

## 2025-02-19 ASSESSMENT — PAIN SCALES - GENERAL
PAINLEVEL_OUTOF10: 5 - MODERATE PAIN
PAINLEVEL_OUTOF10: 5 - MODERATE PAIN
PAINLEVEL_OUTOF10: 6
PAINLEVEL_OUTOF10: 8
PAINLEVEL_OUTOF10: 8
PAINLEVEL_OUTOF10: 10 - WORST POSSIBLE PAIN
PAINLEVEL_OUTOF10: 10 - WORST POSSIBLE PAIN

## 2025-02-19 ASSESSMENT — PAIN DESCRIPTION - ORIENTATION
ORIENTATION: RIGHT;LEFT

## 2025-02-19 ASSESSMENT — ENCOUNTER SYMPTOMS
WEAKNESS: 1
CARDIOVASCULAR NEGATIVE: 1
RESPIRATORY NEGATIVE: 1

## 2025-02-19 NOTE — PROGRESS NOTES
Jake Banerjee is a 69 y.o. male on day 0 of admission presenting with Acute idiopathic gout of multiple sites.    Subjective   Interval History:   Patient seen and examined  Interval decrease in left knee pain  Interval decrease in swelling and redness of left leg  No fever or chills  No chest pain no shortness of breath  No nausea vomiting or diarrhea    Review of Systems   All other systems reviewed and are negative.      Objective   Range of Vitals (last 24 hours)  Heart Rate:  [76-88]   Temp:  [36.9 °C (98.4 °F)-37.9 °C (100.2 °F)]   Resp:  [15-18]   BP: (103-127)/(58-67)   SpO2:  [96 %-100 %]   Daily Weight  02/18/25 : 70.3 kg (155 lb)    Body mass index is 22.89 kg/m².    Physical Exam  Constitutional:       Appearance: Normal appearance.   HENT:      Head: Normocephalic and atraumatic.      Nose: Nose normal.      Mouth/Throat:      Mouth: Mucous membranes are moist.      Pharynx: Oropharynx is clear.   Eyes:      General: No scleral icterus.  Cardiovascular:      Rate and Rhythm: Normal rate and regular rhythm.   Pulmonary:      Effort: Pulmonary effort is normal.      Breath sounds: Normal breath sounds.   Abdominal:      General: Bowel sounds are normal.      Palpations: Abdomen is soft.   Musculoskeletal:         General: Normal range of motion.      Cervical back: Normal range of motion and neck supple.      Comments: Bilateral knee and ankle swelling  Right hallux swelling, mild erythema    Skin:     Comments: Left leg erythema, calor  Left hallux black discoloration under nail bed   Neurological:      General: No focal deficit present.      Mental Status: He is alert.   Psychiatric:         Mood and Affect: Mood normal.         Behavior: Behavior normal.     Antibiotics  ampicillin-sulbactam - 3 gram/100 mL  vancomycin - 1.75 gram/350 mL    Relevant Results  Labs  Results from last 72 hours   Lab Units 02/19/25  0518 02/18/25  0911   WBC AUTO x10*3/uL 8.3 9.5   HEMOGLOBIN g/dL 9.5* 10.8*   HEMATOCRIT %  29.2* 31.8*   PLATELETS AUTO x10*3/uL 107* 128*   NEUTROS PCT AUTO % 74.6 78.1   LYMPHS PCT AUTO % 10.0 6.6   MONOS PCT AUTO % 13.1 14.5   EOS PCT AUTO % 1.3 0.2     Results from last 72 hours   Lab Units 02/19/25  0518 02/18/25  0911   SODIUM mmol/L 136 135*   POTASSIUM mmol/L 3.7 3.9   CHLORIDE mmol/L 104 102   CO2 mmol/L 26 25   BUN mg/dL 19 16   CREATININE mg/dL 0.73 0.80   GLUCOSE mg/dL 113* 139*   CALCIUM mg/dL 8.7 9.1   ANION GAP mmol/L 10 12   EGFR mL/min/1.73m*2 >90 >90     Results from last 72 hours   Lab Units 02/19/25  0518 02/18/25  1459   ALK PHOS U/L 91 92   BILIRUBIN TOTAL mg/dL 1.7* 4.4*   BILIRUBIN DIRECT mg/dL  --  1.2*   PROTEIN TOTAL g/dL 6.4 8.1   ALT U/L 20 23   AST U/L 37 37   ALBUMIN g/dL 3.0* 3.8     Estimated Creatinine Clearance: 97.6 mL/min (by C-G formula based on SCr of 0.73 mg/dL).  C-Reactive Protein   Date Value Ref Range Status   02/18/2025 10.91 (H) <1.00 mg/dL Final     CRP   Date Value Ref Range Status   09/06/2023 6.4 (H) 0 - 2.0 MG/DL Final     Comment:     Performed at [place] [id number]     Microbiology  Reviewed-blood cultures pending  Imaging  US abdomen limited liver    Result Date: 2/19/2025  Interpreted By:  Vamsi Krishna, STUDY: US ABDOMEN LIMITED LIVER;  2/19/2025 3:40 pm   INDICATION: Signs/Symptoms:High bilirubin.     COMPARISON: None.   ACCESSION NUMBER(S): [id number]   ORDERING CLINICIAN: DEVANG ROMERO   TECHNIQUE: Real-time sonographic evaluation of the right upper quadrant was performed.   FINDINGS: LIVER: The liver is diffusely echogenic in appearance with impaired acoustic penetration, consistent with diffuse fatty infiltration.  Liver is enlarged measuring 19.6 cm in sagittal dimension.   GALLBLADDER: The gallbladder is nondistended and demonstrates no evidence of gallstones, wall thickening or surrounding fluid. Sonographic Decker's sign is negative.   BILIARY TREE: Common bile duct is not dilated measuring 2 mm in diameter.    PANCREAS: The pancreas is poorly visualized due to overlying bowel gas.   RIGHT KIDNEY: Right kidney measures  10.3cm in length.  No right hydronephrosis is seen.       Hepatomegaly and probable fatty infiltration of the liver.   Unremarkable gallbladder. No biliary dilation.   Sub visualization of the pancreas due to bowel gas.   MACRO: None.   Signed by: Vamsi Krishna 2/19/2025 3:50 PM Dictation workstation:   [id number]    XR knee 4+ views bilateral    Result Date: 2/18/2025  Interpreted By:  Flavio Arceo, STUDY: XR KNEE 4+ VIEWS BILATERAL; 2/18/2025 4:14 pm   INDICATION: Signs/Symptoms:Pain and swelling both knees   COMPARISON: None.   ACCESSION NUMBER(S): [id number]   ORDERING CLINICIAN: DEVANG ROMERO   TECHNIQUE: Number of films: 8 view radiographs of the knees bilaterally.   FINDINGS: No fractures or destructive lesions are identified. The joint spaces and articular surfaces are maintained. The alignment is anatomic. The soft tissues are unremarkable. There is small right and moderate right suprapatellar joint effusion.       Small right and moderate left suprapatellar joint effusions without underlying fracture or subluxation. No radiographic evidence of acute osteomyelitis.   Signed by: Flavio Arceo 2/18/2025 4:57 PM Dictation workstation:   [id number]    XR ankle bilateral complete minimum 3 views    Result Date: 2/18/2025  Interpreted By:  Flavio Arceo, STUDY: XR ANKLE BILATERAL COMPLETE MINIMUM 3 VIEWS 2/18/2025 4:14 pm   INDICATION: Signs/Symptoms:Rule out arthritis vs infection Injury/Pain   COMPARISON: Left knee from 2017   ACCESSION NUMBER(S): [id number]   ORDERING CLINICIAN: DEVANG ROMERO   TECHNIQUE: Number of films: 6 view radiographs of the ankles bilaterally.   FINDINGS: There is no fracture, blastic or lytic bone lesion. No abnormal periosteal reaction is identified. The ankle mortise and subtalar joints are intact. The alignment is anatomic. Small calcaneal  spurs are seen bilaterally. Mild bilateral soft tissue swelling is noted, without abnormal gas collections or radiopaque foreign bodies.       Mild bilateral soft tissue swelling. No radiographic evidence of acute osteomyelitis.   Signed by: Flavio Arceo 2/18/2025 4:55 PM Dictation workstation:   [id number]    Vascular US Lower Extremity Venous Duplex Bilateral    Result Date: 2/18/2025  Interpreted By:  Vamsi Krishna, STUDY: VASC US LOWER EXTREMITY VENOUS DUPLEX BILATERAL  2/18/2025 3:55 pm   INDICATION: Swelling and pain for 5 days.   ,R60.9 Edema, unspecified   COMPARISON: 09/06/2023.   ACCESSION NUMBER(S): [id number]   ORDERING CLINICIAN: DEVANG ROMERO   TECHNIQUE: Routine ultrasound of the  bilateral lower extremity was performed with duplex Doppler (color and spectral) evaluation.  Static images were obtained for remote interpretation.   FINDINGS: THIGH VEINS: The  bilateral common femoral, femoral, popliteal, proximal medial saphenous, and deep femoral veins are patent and free of thrombus. The veins are normally compressible.  They demonstrate normal phasic flow and augmentation response.   Within the right popliteal fossa there is a small hypoechoic cystic lesion or fluid collection measuring 1.4 x 0.5 x 0.6 cm.   CALF VEINS: The paired peroneal and posterior tibial calf veins are patent.         No evidence of deep venous thrombosis of the  bilateral lower extremity.   Nonspecific small hypoechoic cystic lesion or fluid collection of the right popliteal fossa.       MACRO: None.   Signed by: Vamsi Krishna 2/18/2025 4:01 PM Dictation workstation:   [id number]    XR tibia fibula left 2 views    Result Date: 2/18/2025  Interpreted By:  Ruddy Gage, STUDY: XR TIBIA FIBULA LEFT 2 VIEWS; 2/18/2025 9:32 am   INDICATION: Signs/Symptoms:swelling, pain.   COMPARISON: None.   ACCESSION NUMBER(S): [id number]   ORDERING CLINICIAN: BIPIN DE LA GARZA   TECHNIQUE: Left tib fib two views   FINDINGS: No  fractures or destructive lesions are identified. There is a small plantar calcaneal spur incidentally noted.       No acute pathologic findings are identified.   MACRO: none   Signed by: Ruddy Gage 2/18/2025 10:08 AM Dictation workstation:   [id number]    Assessment/Plan   Left leg cellulitis, resolving  Bilateral knee and ankle swelling/pain-gout, inflammation verses infection         IV Unasyn  IV vancomycin   Follow up blood cultures  Monitor temperature and WBC  Supportive care  Orthopedic follow-up  Podiatry follow-up  Further recommendations based on workup    Ramo Baca MD

## 2025-02-19 NOTE — PROGRESS NOTES
Occupational Therapy    Evaluation/Treatment    Patient Name: Jake Banerjee  MRN: [id number]  Department: [address]  Room: [facility]446-A  Today's Date: 02/19/25  Time Calculation  Start Time: 0807  Stop Time: 0845  Time Calculation (min): 38 min     Assessment:  OT Assessment: Patient is a 69 year old male admitted with acute idiopathic gout of multiple sites. At baseline, patient is independent with ADLs/IADLs. He is unable to stand this date and unable to complete LB dressing tasks. He is presenting below baseline level with a deceline in strength, balance, activity tolerance, and function, resulting in an increased need for assist with ADL/IADL tasks. Recommned skilled OT to address the above deficits and increase patient's safety and independence with daily tasks.  Prognosis: Good  Barriers to Discharge Home: Caregiver assistance, Physical needs  Caregiver Assistance: Patient lives alone and/or does not have reliable caregiver assistance  Physical Needs: 24hr mobility assistance needed, Intermittent ADL assistance needed, High falls risk due to function or environment  Evaluation/Treatment Tolerance: Patient limited by pain  End of Session Communication: Bedside nurse  End of Session Patient Position: Bed, 3 rail up, Alarm on  OT Assessment Results: Decreased ADL status, Decreased safe judgment during ADL, Decreased endurance, Decreased functional mobility, Decreased gross motor control, Decreased IADLs  Prognosis: Good  Evaluation/Treatment Tolerance: Patient limited by pain  Strengths: Premorbid level of function  Barriers to Participation: Comorbidities, Coping skills  Plan:  Treatment Interventions: ADL retraining, Functional transfer training, UE strengthening/ROM, Endurance training, Patient/family training, Equipment evaluation/education, Neuromuscular reeducation, Compensatory technique education  OT Frequency: 3 times per week  OT Discharge Recommendations: Moderate intensity level of continued care  Equipment  Recommended upon Discharge: Wheeled walker  OT Recommended Transfer Status: Dependent  OT - OK to Discharge: Yes  Treatment Interventions: ADL retraining, Functional transfer training, UE strengthening/ROM, Endurance training, Patient/family training, Equipment evaluation/education, Neuromuscular reeducation, Compensatory technique education    Subjective   Current Problem:  1. Acute idiopathic gout of multiple sites        2. Pain in both lower extremities        3. Swelling  Vascular US Lower Extremity Venous Duplex Bilateral    Vascular US Lower Extremity Venous Duplex Bilateral        General:   OT Received On: 02/19/25  General  Reason for Referral: decline in ADLs, acute idopathic gout of multiple sites  Referred By: Dr. Jackson  Past Medical History Relevant to Rehab: gout, alcohol abuse, Left knee aspiration from October of last year demonstrated uric acid crystals  Family/Caregiver Present: No  Prior to Session Communication: Bedside nurse  Patient Position Received: Bed, 3 rail up, Alarm on  Preferred Learning Style: verbal, visual  General Comment: Patient is a 69 year old male who presented to the ED with c/o swelling to B LE and inability to ambulate. Patient admitted with gout flare up. Patient is cleared by nursing for therapy. He is in bed upon arrival and agreable to participate. +tele   Precautions:  LE Weight Bearing Status: Weight Bearing as Tolerated  Medical Precautions: Fall precautions  Precautions Comment: WBAT B LE    Pain:  Pain Assessment  Pain Assessment: 0-10  0-10 (Numeric) Pain Score: 10 - Worst possible pain  Pain Type: Acute pain  Pain Location: Leg  Pain Orientation: Right, Left  Pain Descriptors: Throbbing  Pain Frequency: Constant/continuous  Pain Onset: Ongoing  Pain Interventions: Repositioned (RN aware)  Response to Interventions: No change in pain    Objective   Cognition:  Cognition Comments: patient is hesistant for movement. follows some commands throughout. resistant to  education at times  Safety/Judgement:  (decreased)  Insight: Mild     Home Living:  Type of Home: House  Lives With: Other (Comment) (Roommate)  Home Adaptive Equipment: Walker rolling or standard  Home Layout: Multi-level, Able to live on main level with bedroom/bathroom  Home Access: Stairs to enter with rails  Entrance Stairs-Rails:  (one railing)  Entrance Stairs-Number of Steps: 3 steps, each with a landing between?  Bathroom Shower/Tub: Tub/shower unit  Bathroom Equipment: Grab bars in shower  Home Living Comments: patient reports 5 falls in the last 3 months  Prior Function:  Level of [place]: Independent with ADLs and functional transfers, Independent with homemaking with ambulation  ADL Assistance: Independent  Homemaking Assistance: Independent  Ambulatory Assistance: Independent  Prior Function Comments: patient reports he does not use a device for mobility. he reports 5 falls in the last 3 months due to LOB. The last 2 days he reports being unable to transfer/walk due to his gout. Unable to dress/bath as well  IADL History:  Homemaking Responsibilities: Yes  IADL Comments: patient reports he is independent with ADLs/IADLs at baseline  ADL:  Eating Assistance: Independent  Eating Deficit: Setup (per clinical judgement)  Grooming Assistance: Stand by  Grooming Deficit: Setup, Increased time to complete (per clinical judgement, seated)  Bathing Assistance: Moderate  Bathing Deficit: Setup, Supervision/safety, Increased time to complete , Verbal cueing, Buttocks, Right lower leg including foot, Left lower leg including foot, Use of adaptive equipment (per clinical judgement)  UE Dressing Assistance: Minimal  UE Dressing Deficit: Pull around back, Pull down in back (per clinical judgement)  LE Dressing Assistance: Total  LE Dressing Deficit: Don/doff R sock, Don/doff L sock  Toileting Assistance with Device: Total  Toileting Deficit: Use of bedpan/urinal setup    Activity Tolerance:  Endurance: Decreased  "tolerance for upright activites  Activity Tolerance Comments: patient limited by pain    Bed Mobility/Transfers: Bed Mobility  Bed Mobility: Yes  Bed Mobility 1  Bed Mobility 1: Supine to sitting  Level of Assistance 1: Moderate assistance  Bed Mobility Comments 1: head of bed elevated, cues on technique. increased time and very effortful. patient often using his hands to hold his B LE due to pain. assist at hips with use of draw sheet to sit patient at EOB, assist with trunk up  Bed Mobility 2  Bed Mobility  2: Sitting to supine  Level of Assistance 2: Close supervision  Bed Mobility Comments 2: patient very adament that therapist does not \"pull on his legs\" education provided on technqiue. no physical assist required. a significant amount of time required for patient to return to supine, unable to fully extend knees once returned to bed. often uses his hands to move/raise his B LE  Bed Mobility 3  Bed Mobility 3: Scooting  Level of Assistance 3: Close supervision  Bed Mobility Comments 3: significant amount of increased time for patient to scoot his buttocks up in bed with use of his B UE  Bed Mobility 4  Bed Mobility 4: Rolling right, Rolling left  Level of Assistance 4: Close supervision  Bed Mobility Comments 4: with use of bed rails. increased time    Transfers  Transfer: No (patient declines)    Functional Mobility:  Functional Mobility  Functional Mobility Performed: No (patient unable)  Sitting Balance:  Static Sitting Balance  Static Sitting-Balance Support: Feet supported, Bilateral upper extremity supported  Static Sitting-Level of Assistance: Close supervision  Static Sitting-Comment/Number of Minutes: ~15 minutes    Therapy/Activity: Therapeutic Activity  Therapeutic Activity Performed: Yes  Therapeutic Activity 1: patient txfers from supine to EOB with Mod A. head of bed elevated, increased time and use of bed rails. Patient requires assistance with use of draw sheet to scoot his hips towards EOB and " "assist with trunk up. He sits EOB ~15 minutes. Patient uses his B UE to support his L LE while sitting, as per patient report, he is unable to flex that knee. patient is educated on the importance of movement and OOB activity participation. he is very stuck on the fact that \"too many people came in yesterday and moved my legs around\" and does not want therapist \"pulling at his legs.\" Patient tolerates sitting EOB ~15 minutes. he txfers from EOB to supine with a significant amout of increased time with close supervision and cues on technique. patient uses his B UE to assist with bringing his B LE onto the bed. once back in bed, patient requires an increased amount of time to reposition. education on proper positioning with poor carryover noted    Sensation:  Sensation Comment: patient denies numbness/tingling  Strength:  Strength Comments: B UE 4/5 grossly  Coordination:  Movements are Fluid and Coordinated: No  Upper Body Coordination: WFL  Lower Body Coordination: impaired B LE  Coordination Comment: slow, effortful movements   Hand Function:  Hand Function  Gross Grasp: Functional  Coordination: Functional  Extremities: RUE   RUE : Within Functional Limits and LUE   LUE: Within Functional Limits    Outcome Measures: [facility] Daily Activity  Putting on and taking off regular lower body clothing: Total  Bathing (including washing, rinsing, drying): A lot  Putting on and taking off regular upper body clothing: A little  Toileting, which includes using toilet, bedpan or urinal: Total  Taking care of personal grooming such as brushing teeth: A little  Eating Meals: A little  Daily Activity - Total Score: 13    Education Documentation  Body Mechanics, taught by Neelam Brown OT at 2/19/2025 10:04 AM.  Learner: Patient  Readiness: Acceptance  Method: Explanation, Demonstration  Response: Needs Reinforcement  Comment: education on importance of OOB activity participation and movement, safety with functional txfers and " positioning. education on OT POC    Precautions, taught by Neelam Brown OT at 2/19/2025 10:04 AM.  Learner: Patient  Readiness: Acceptance  Method: Explanation, Demonstration  Response: Needs Reinforcement  Comment: education on importance of OOB activity participation and movement, safety with functional txfers and positioning. education on OT POC    Education Comments  No comments found.      Goals:  Encounter Problems       Encounter Problems (Active)       OT Goals       UB ADLs (Progressing)       Start:  02/19/25    Expected End:  03/19/25       Patient will complete UB ADL tasks, with set-up using AE need in order to increase patient's safety and independence with self-care tasks.          LB ADLs (Progressing)       Start:  02/19/25    Expected End:  03/19/25       Patient will complete LB ADL tasks, with stand by assist using AE need in order to increase patient's safety and independence with self-care tasks.          Functional Transfers (Progressing)       Start:  02/19/25    Expected End:  03/19/25       Patient will complete functional transfers with stand by assist using least restrictive device, in order to increase patient's safety and independence with daily tasks.          Standing Tolerance  (Progressing)       Start:  02/19/25    Expected End:  03/19/25       Patient will demonstrate the ability to stand at least >/= 1 minute in order to increase safety and independence with daily tasks.         Activity Tolerance  (Progressing)       Start:  02/19/25    Expected End:  03/19/25       Patient will demonstrate the ability to participate in functional activity at least >/= 25 minutes in order to increase patient's safety and independence with daily tasks.

## 2025-02-19 NOTE — CONSULTS
Inpatient consult to Neurology  Consult performed by: TOMER Nieves-CNP  Consult ordered by: Raghav Mcdonald MD          History Of Present Illness  Jake Banerjee is a 69 y.o. male with history of gout, foreign body in eye, EtOH abuse presenting with inability to ambulate.  Patient tells me that this started on Friday he noticed his legs were severely weak and he was unable to ambulate without the assistance of crutches or a walker prior to this he was able to walk without assistance however has had multiple falls in the last couple months.  He notes a longstanding history of gout but primarily to the ankles however feels it is in his knees this time making him weaker.  He does admit to drinking 2 beers daily of tall cans of essence 45      Past Medical History  Past Medical History:   Diagnosis Date    Foreign body in cornea, left eye, initial encounter 11/02/2017    Acute foreign body of left cornea     Surgical History  Past Surgical History:   Procedure Laterality Date    HERNIA REPAIR  10/18/2017    Hernia Repair     Social History  Social History     Tobacco Use    Smoking status: Never    Smokeless tobacco: Never   Vaping Use    Vaping status: Never Used   Substance Use Topics    Alcohol use: Not Currently     Alcohol/week: 1.0 standard drink of alcohol     Types: 1 Cans of beer per week     Allergies  Sulfa (sulfonamide antibiotics)  Medications Prior to Admission   Medication Sig Dispense Refill Last Dose/Taking    colchicine 0.6 mg tablet Take 2 tablets (1.2 mg) by mouth once daily as needed for muscle/joint pain.   Unknown    dexAMETHasone (Decadron) 2 mg tablet TAKE 3 TABLETS BY MOUTH ONE TIME DAILY WITH BREAKFAST 30 tablet 0        Review of Systems   Respiratory: Negative.     Cardiovascular: Negative.    Musculoskeletal:  Positive for gait problem.   Neurological:  Positive for weakness.       Neurological Exam  Neurologic exam:    Awake alert oriented to all, no dysarthria, no  "aphasia  Naming repetition intact, speech is fluent  PERRL, EOMI without ptosis or nystagmus, visual fields intact, face symmetrical, tongue midline  Strength in upper extremities is 5/5 bilateral lower extremities with weakness at the hip 3/5 unable to test knee and ankle due to pain  Sensation is intact  FTN intact NEETA intact  Heel-to-shin unable  Reflexes hyperreflexia, positive Esther's  Toes down going bilaterally  Gait not assessed at this time    Physical Exam  Cardiovascular:      Rate and Rhythm: Normal rate.   Pulmonary:      Effort: Pulmonary effort is normal.       Last Recorded Vitals  Blood pressure 108/66, pulse 88, temperature 37 °C (98.6 °F), temperature source Temporal, resp. rate 18, height 1.753 m (5' 9\"), weight 70.3 kg (155 lb), SpO2 96%.    Relevant Results                                      I have personally reviewed the following imaging results XR knee 4+ views bilateral    Result Date: 2/18/2025  Interpreted By:  Flavio Arceo, STUDY: XR KNEE 4+ VIEWS BILATERAL; 2/18/2025 4:14 pm   INDICATION: Signs/Symptoms:Pain and swelling both knees   COMPARISON: None.   ACCESSION NUMBER(S): [id number]   ORDERING CLINICIAN: DEVANG ROMERO   TECHNIQUE: Number of films: 8 view radiographs of the knees bilaterally.   FINDINGS: No fractures or destructive lesions are identified. The joint spaces and articular surfaces are maintained. The alignment is anatomic. The soft tissues are unremarkable. There is small right and moderate right suprapatellar joint effusion.       Small right and moderate left suprapatellar joint effusions without underlying fracture or subluxation. No radiographic evidence of acute osteomyelitis.   Signed by: Flavio Arceo 2/18/2025 4:57 PM Dictation workstation:   [id number]    XR ankle bilateral complete minimum 3 views    Result Date: 2/18/2025  Interpreted By:  Flavio Arceo, STUDY: XR ANKLE BILATERAL COMPLETE MINIMUM 3 VIEWS 2/18/2025 4:14 pm   INDICATION: " Signs/Symptoms:Rule out arthritis vs infection Injury/Pain   COMPARISON: Left knee from 2017   ACCESSION NUMBER(S): [id number]   ORDERING CLINICIAN: DEVANG ROMERO   TECHNIQUE: Number of films: 6 view radiographs of the ankles bilaterally.   FINDINGS: There is no fracture, blastic or lytic bone lesion. No abnormal periosteal reaction is identified. The ankle mortise and subtalar joints are intact. The alignment is anatomic. Small calcaneal spurs are seen bilaterally. Mild bilateral soft tissue swelling is noted, without abnormal gas collections or radiopaque foreign bodies.       Mild bilateral soft tissue swelling. No radiographic evidence of acute osteomyelitis.   Signed by: Flavio Arceo 2/18/2025 4:55 PM Dictation workstation:   [id number]    Vascular US Lower Extremity Venous Duplex Bilateral    Result Date: 2/18/2025  Interpreted By:  Vamsi Krishna, STUDY: [facility] US LOWER EXTREMITY VENOUS DUPLEX BILATERAL  2/18/2025 3:55 pm   INDICATION: Swelling and pain for 5 days.   ,R60.9 Edema, unspecified   COMPARISON: 09/06/2023.   ACCESSION NUMBER(S): [id number]   ORDERING CLINICIAN: DEVANG ROMERO   TECHNIQUE: Routine ultrasound of the  bilateral lower extremity was performed with duplex Doppler (color and spectral) evaluation.  Static images were obtained for remote interpretation.   FINDINGS: THIGH VEINS: The  bilateral common femoral, femoral, popliteal, proximal medial saphenous, and deep femoral veins are patent and free of thrombus. The veins are normally compressible.  They demonstrate normal phasic flow and augmentation response.   Within the right popliteal fossa there is a small hypoechoic cystic lesion or fluid collection measuring 1.4 x 0.5 x 0.6 cm.   CALF VEINS: The paired peroneal and posterior tibial calf veins are patent.         No evidence of deep venous thrombosis of the  bilateral lower extremity.   Nonspecific small hypoechoic cystic lesion or fluid collection of the right  popliteal fossa.       MACRO: None.   Signed by: Vamsi Krishna 2/18/2025 4:01 PM Dictation workstation:   [id number]    XR tibia fibula left 2 views    Result Date: 2/18/2025  Interpreted By:  Ruddy Gage, STUDY: XR TIBIA FIBULA LEFT 2 VIEWS; 2/18/2025 9:32 am   INDICATION: Signs/Symptoms:swelling, pain.   COMPARISON: None.   ACCESSION NUMBER(S): [id number]   ORDERING CLINICIAN: BIPIN DE LA GARZA   TECHNIQUE: Left tib fib two views   FINDINGS: No fractures or destructive lesions are identified. There is a small plantar calcaneal spur incidentally noted.       No acute pathologic findings are identified.   MACRO: none   Signed by: Ruddy Gage 2/18/2025 10:08 AM Dictation workstation:   [id number]  .      Assessment/Plan   Assessment & Plan  Acute idiopathic gout of multiple sites    Ambulatory dysfunction   69 y.o. male with history of gout, foreign body in eye, EtOH abuse presenting with inability to ambulate.  Exam with weakness to the bilateral lower extremities at the hip and distal as well as hyperreflexia.  Will order MRI of the cervical spine as well as MRI of the brain and lab work.  Reassess tomorrow.  Discussed at length with patient discontinuation of alcohol use and the effects of alcohol has on the body brain and gout.  Cellulitis and abscess of left lower extremity    Alcohol abuse               Yi Salgado, APRN-CNP

## 2025-02-19 NOTE — PROGRESS NOTES
Vancomycin Dosing by Pharmacy- FOLLOW UP    Jake Banerjee is a 69 y.o. year old male who Pharmacy has been consulted for vancomycin dosing for osteomyelitis/septic arthritis. Based on the patient's indication and renal status this patient is being dosed based on a goal AUC of 400-600.     Renal function is currently stable.    Current vancomycin dose: 1750 mg given every 24 hours    Estimated vancomycin AUC on current dose: 459 mg/L.hr   Most recent vancomycin level: 12.7 mcg/mL from around 05:18 on     Visit Vitals  /67 (BP Location: Right arm, Patient Position: Lying)   Pulse 80   Temp 37.6 °C (99.7 °F) (Temporal)   Resp 16        Lab Results   Component Value Date    CREATININE 0.73 2025    CREATININE 0.80 2025    CREATININE 0.85 10/11/2024    CREATININE 0.90 10/11/2023        Patient weight is as follows:   Vitals:    25 0841   Weight: 70.3 kg (155 lb)       Cultures:  No results found for the encounter in last 14 days.       I/O last 3 completed shifts:  In: 240 (3.4 mL/kg) [P.O.:240]  Out: 650 (9.2 mL/kg) [Urine:650 (0.3 mL/kg/hr)]  Weight: 70.3 kg   I/O during current shift:  No intake/output data recorded.    Temp (24hrs), Av.2 °C (98.9 °F), Min:36.6 °C (97.9 °F), Max:37.9 °C (100.2 °F)      Assessment/Plan    Within goal AUC range. Continue current vancomycin regimen.    This dosing regimen is predicted by [employer] to result in the following pharmacokinetic parameters:    Regimen: 1750 mg IV every 24 hours.  Start time: 20:11 on 2025  Exposure target: AUC24 (range)400-600 mg/L.hr   [id number]: 432 mg/L.hr  AUC24,ss: 459 mg/L.hr  Probability of AUC24 > 400: 75 %  Ctrough,ss: 11.2 mg/L  Probability of Ctrough,ss > 20: 6 %    The next level will be obtained on  at 0500. May be obtained sooner if clinically indicated.   Will continue to monitor renal function daily while on vancomycin and order serum creatinine at least every 48 hours if not already ordered.  Follow for  continued vancomycin needs, clinical response, and signs/symptoms of toxicity.       Chantell Garcia, PharmD

## 2025-02-19 NOTE — PROGRESS NOTES
02/19/25 1225   Discharge Planning   Living Arrangements Friends   Support Systems Friends/neighbors   Assistance Needed ususally independent - doesn't drive [place] bus/ [place]   Type of Residence Private residence   Number of Stairs to Enter Residence 3   Number of Stairs Within Residence 0   Do you have animals or pets at home? Yes   Type of Animals or Pets cats   Who is requesting discharge planning? Patient   Expected Discharge Disposition Othe   Does the patient need discharge transport arranged? Yes     Patient feels after a couple days of gout medicine he will be fine.   Will reEval tomorrow.  Therapy at this point recommending Moderate Intensity.  Patient would need a precert with medicaid.    Patient states he couldn't get to the pharmacy for his gout meds.

## 2025-02-19 NOTE — CONSULTS
PODIATRIC MEDICINE & SURGERY - INITIAL CONSULT NOTE    Subjective   Jake Banerjee is a 69 y.o. male who is on day 0 of admission for Acute idiopathic gout of multiple sites.     HPI: Patient complains of swelling to legs and ankles. Patient states that he has had gout flares in the past which have been similar. Patient states that he lives at home but has had difficulty getting around due to his gout. Patient state that he has ran out of his gout medicine and believes that could be contributing to his overall condition. Patient denies any trauma or wounds. Patient denies any constitutional symptoms at this time.     Review of Systems  A 10+ point ROS was completed and otherwise negative except as noted above and per HPI.     Past Medical History:   Diagnosis Date    Foreign body in cornea, left eye, initial encounter 11/02/2017    Acute foreign body of left cornea       Social History     Tobacco Use    Smoking status: Never    Smokeless tobacco: Never   Vaping Use    Vaping status: Never Used   Substance Use Topics    Alcohol use: Not Currently     Alcohol/week: 1.0 standard drink of alcohol     Types: 1 Cans of beer per week       Recent Surgeries in Podiatry            No cases to display            Allergies   Allergen Reactions    Sulfa (Sulfonamide Antibiotics) Rash       Medications  Scheduled medications  ampicillin-sulbactam, 3 g, intravenous, q6h  [START ON 2/19/2025] colchicine, 0.6 mg, oral, BID  enoxaparin, 40 mg, subcutaneous, q24h  influenza, 0.5 mL, intramuscular, During hospitalization  folic acid, 1 mg, oral, Daily  multivitamin with minerals, 1 tablet, oral, Daily  [START ON 2/19/2025] pantoprazole, 40 mg, oral, Daily before breakfast   Or  [START ON 2/19/2025] pantoprazole, 40 mg, intravenous, Daily before breakfast  polyethylene glycol, 17 g, oral, Daily  [START ON 2/21/2025] thiamine, 100 mg, oral, Daily  thiamine, 100 mg, intravenous, Daily  vancomycin, 1,750 mg, intravenous, q24h  "MARIE      Continuous medications       PRN medications: acetaminophen, ibuprofen, oxyCODONE, vancomycin    Objective   Visit Vitals  /62 (BP Location: Right arm, Patient Position: Lying)   Pulse 80   Temp 37 °C (98.6 °F) (Temporal)   Resp 15   Ht 1.753 m (5' 9\")   Wt 70.3 kg (155 lb)   SpO2 97%   BMI 22.89 kg/m²   Smoking Status Never   BSA 1.85 m²       Physical Exam  Constitutional: NAD and AAOx3  HEENT: Head is normocephalic and atraumatic. External ear is normal B/L. Conjunctivae normal B/L. Nose is normal. Oropharynx is clear. Mouth is moist.  Cardiovascular: Normal rate.  Pulmonary: No increased work of breathing.  Psychological: Appropriate mood and behavior.     Vascular: DP and PT pulses are palpable bilateral.  CFT is less than 3 seconds bilateral.  Skin temperature is warm to cool proximal to distal bilateral with focal slight increase in temperature noted to left calf and right great toe.      Neurologic:  Light touch is intact to the foot bilateral. Protective sensation is diminished to the foot B/L     Musculoskeletal: Muscle strength is decreased comparative to age and activity level.     Dermatologic: Nails 1-5 bilateral are intact and elongated. Skin is supple with normal texture and turgor noted.  Webspaces are clean, dry and intact bilateral. There are no hyperkeratoses or ulcerations. Excoriations noted to left posterior calf.   Right great toe: blanchable erythema noted.    Lab Results   Component Value Date    WBC 9.5 02/18/2025    HGB 10.8 (L) 02/18/2025    HCT 31.8 (L) 02/18/2025     (L) 02/18/2025    CHOL 185 06/19/2018    TRIG 56 06/19/2018    HDL 82.2 06/19/2018    ALT 23 02/18/2025    AST 37 02/18/2025     (L) 02/18/2025    K 3.9 02/18/2025     02/18/2025    CREATININE 0.80 02/18/2025    BUN 16 02/18/2025    CO2 25 02/18/2025    INR 1.2 02/18/2025     par  Lab Results   Component Value Date    SEDRATE 97 (H) 02/18/2025     Lab Results   Component Value Date    CRP " 10.91 (H) 02/18/2025      Cultures  No results found for the last 90 days.      IMAGING  XR knee 4+ views bilateral    Result Date: 2/18/2025  Interpreted By:  Flavio Arceo, STUDY: XR KNEE 4+ VIEWS BILATERAL; 2/18/2025 4:14 pm   INDICATION: Signs/Symptoms:Pain and swelling both knees   COMPARISON: None.   ACCESSION NUMBER(S): [id number]   ORDERING CLINICIAN: DEVANG ROMERO   TECHNIQUE: Number of films: 8 view radiographs of the knees bilaterally.   FINDINGS: No fractures or destructive lesions are identified. The joint spaces and articular surfaces are maintained. The alignment is anatomic. The soft tissues are unremarkable. There is small right and moderate right suprapatellar joint effusion.       Small right and moderate left suprapatellar joint effusions without underlying fracture or subluxation. No radiographic evidence of acute osteomyelitis.   Signed by: Flvaio Arceo 2/18/2025 4:57 PM Dictation workstation:   [id number]    XR ankle bilateral complete minimum 3 views    Result Date: 2/18/2025  Interpreted By:  Flavio Arceo, STUDY: XR ANKLE BILATERAL COMPLETE MINIMUM 3 VIEWS 2/18/2025 4:14 pm   INDICATION: Signs/Symptoms:Rule out arthritis vs infection Injury/Pain   COMPARISON: Left knee from 2017   ACCESSION NUMBER(S): [id number]   ORDERING CLINICIAN: DEVANG ROMERO   TECHNIQUE: Number of films: 6 view radiographs of the ankles bilaterally.   FINDINGS: There is no fracture, blastic or lytic bone lesion. No abnormal periosteal reaction is identified. The ankle mortise and subtalar joints are intact. The alignment is anatomic. Small calcaneal spurs are seen bilaterally. Mild bilateral soft tissue swelling is noted, without abnormal gas collections or radiopaque foreign bodies.       Mild bilateral soft tissue swelling. No radiographic evidence of acute osteomyelitis.   Signed by: Flavio Arceo 2/18/2025 4:55 PM Dictation workstation:   [id number]    Vascular US Lower Extremity Venous  Duplex Bilateral    Result Date: 2/18/2025  Interpreted By:  Vamsi Krishna, STUDY: [facility] LOWER EXTREMITY VENOUS DUPLEX BILATERAL  2/18/2025 3:55 pm   INDICATION: Swelling and pain for 5 days.   ,R60.9 Edema, unspecified   COMPARISON: 09/06/2023.   ACCESSION NUMBER(S): [id number]   ORDERING CLINICIAN: DEVANG ROMERO   TECHNIQUE: Routine ultrasound of the  bilateral lower extremity was performed with duplex Doppler (color and spectral) evaluation.  Static images were obtained for remote interpretation.   FINDINGS: THIGH VEINS: The  bilateral common femoral, femoral, popliteal, proximal medial saphenous, and deep femoral veins are patent and free of thrombus. The veins are normally compressible.  They demonstrate normal phasic flow and augmentation response.   Within the right popliteal fossa there is a small hypoechoic cystic lesion or fluid collection measuring 1.4 x 0.5 x 0.6 cm.   CALF VEINS: The paired peroneal and posterior tibial calf veins are patent.         No evidence of deep venous thrombosis of the  bilateral lower extremity.   Nonspecific small hypoechoic cystic lesion or fluid collection of the right popliteal fossa.       MACRO: None.   Signed by: Vamsi Krishna 2/18/2025 4:01 PM Dictation workstation:   [id number]    XR tibia fibula left 2 views    Result Date: 2/18/2025  Interpreted By:  Ruddy Gage, STUDY: XR TIBIA FIBULA LEFT 2 VIEWS; 2/18/2025 9:32 am   INDICATION: Signs/Symptoms:swelling, pain.   COMPARISON: None.   ACCESSION NUMBER(S): [id number]   ORDERING CLINICIAN: BIPIN DE LA GARZA   TECHNIQUE: Left tib fib two views   FINDINGS: No fractures or destructive lesions are identified. There is a small plantar calcaneal spur incidentally noted.       No acute pathologic findings are identified.   MACRO: none   Signed by: Ruddy Gage 2/18/2025 10:08 AM Dictation workstation:   [id number]      Assessment/Plan   Jake Banerjee is a 69 y.o. male who is on day 0 of admission for Acute  idiopathic gout of multiple sites. Podiatry consulted for Bilateral ankle and leg swelling.     # Acute idiopathic gout of multiple site  # Cellulitis, left calf  # Generalized swelling  # Ambulatory dysfunction      Reviewed labs, imaging and notes. Afebrile and non-tachycardic.  - WBC: 9.5 , ESR 97, CRP 10.91.   - XR: unremarkable   - Blood cultures: pending    - On examination, patient does not appear to have any infectious process or arthritis occurring to the ankles bilaterally. Given patient's history of gout, it is likely that swelling could be related to patient's history of gout, given high inflammatory markers and WBC WNL.     Plan/Recommendation:  - full weightbearing as tolerated  - Continue abx per ID  - No plan for podiatric surgical intervention at this time  - Thank you for the consult. Podiatry team will continue to follow peripherally.     Patient examined and evaluated. Please await final signature from attending physician, Dr. Concepcion.    Nish Dwyer, PGY-2  Podiatric Medicine & Surgery

## 2025-02-19 NOTE — PROGRESS NOTES
"Spiritual Care Visit  Spiritual Care Request    Reason for Visit:  Routine Visit: Introduction     Request Received From:       Focus of Care:  Visited With: Patient         Refer to :          Spiritual Care Assessment    Spiritual Assessment:                      Care Provided:  Intended Effects: Establish rapport and connectedness, Build relationship of care and support, Demonstrate caring and concern, Lessen someone's feelings of isolation  Methods: Offer emotional support  Interventions: Explain  role    Sense of Community and or Latter day Affiliation:  None         Addressed Needs/Concerns and/or Ila Through:          Outcome:  Outcome of Spiritual Care Visit: Identifying spiritual/emotional issues, Comfort/healing presence, Identifying patient's strengths/source of hope, Support system identified     Advance Directives:         Spiritual Care Annotation    Annotation:   provided patient support while rounding the Unit.  introduced  services of  [facility].   explained the role of the  in providing emotional and spiritual support for patient's and family while in admitted to the hospital.      greeted the patient resting in his bed, appears comfortable. Patient talked about his grief for a loss of a friend who  suddenly, his questions was \"why\" we talked about the mystery of life and death. Patient talked about his [employer] dave and how he attends [facility] regularly.  When asked about changing his Latter day affiliation the patient declined.     No spiritual or Judaism needs were expressed. Spiritual care will remain available for support as requested.         "

## 2025-02-19 NOTE — ASSESSMENT & PLAN NOTE
69 y.o. male with history of gout, foreign body in eye, EtOH abuse presenting with inability to ambulate.  Exam with weakness to the bilateral lower extremities at the hip and distal as well as hyperreflexia.  Will order MRI of the cervical spine as well as MRI of the brain and lab work.  Reassess tomorrow.  Discussed at length with patient discontinuation of alcohol use and the effects of alcohol has on the body brain and gout.

## 2025-02-19 NOTE — CARE PLAN
Problem: Skin  Goal: Decreased wound size/increased tissue granulation at next dressing change  2/18/2025 2131 by Tressa Angulo RN  Outcome: Progressing  2/18/2025 2131 by Tressa Angulo RN  Outcome: Progressing  Goal: Participates in plan/prevention/treatment measures  2/18/2025 2131 by Tressa Angulo RN  Outcome: Progressing  2/18/2025 2131 by Tressa Angulo RN  Outcome: Progressing  Goal: Prevent/manage excess moisture  2/18/2025 2131 by Tressa Angulo RN  Outcome: Progressing  2/18/2025 2131 by Tressa Angulo RN  Outcome: Progressing  Goal: Prevent/minimize sheer/friction injuries  2/18/2025 2131 by Tressa Angulo RN  Outcome: Progressing  2/18/2025 2131 by Tressa Angulo RN  Outcome: Progressing  Goal: Promote/optimize nutrition  2/18/2025 2131 by Tressa Angulo RN  Outcome: Progressing  2/18/2025 2131 by Tressa Angulo RN  Outcome: Progressing  Goal: Promote skin healing  2/18/2025 2131 by Tressa Angulo RN  Outcome: Progressing  2/18/2025 2131 by Tressa Angulo RN  Outcome: Progressing   The patient's goals for the shift include rest.      The clinical goals for the shift include pt safety.

## 2025-02-19 NOTE — PROGRESS NOTES
Jake Banerjee is a 69 y.o. male on day 0 of admission presenting with Acute idiopathic gout of multiple sites.      Subjective   Reviewed input from neurology podiatry and orthopedics and ID  Patient states she feels somewhat better but still with significant amount of pain in both knees both ankles  He admits now to falls at home recently       Objective     Last Recorded Vitals  /60 (BP Location: Left arm, Patient Position: Lying)   Pulse 80   Temp 36.9 °C (98.4 °F) (Temporal)   Resp 18   Wt 70.3 kg (155 lb)   SpO2 98%   Intake/Output last 3 Shifts:    Intake/Output Summary (Last 24 hours) at 2/19/2025 1413  Last data filed at 2/19/2025 1019  Gross per 24 hour   Intake 780 ml   Output 650 ml   Net 130 ml       Physical Exam  Alert oriented x 3 cooperative no distress  Normocephalic/atraumatic EOMI PERRLA  Chest clear  Heart regular  Abdomen soft nontender  Extremities somewhat improved swelling in the joints and definitely improved left calf cellulitis  Neurologic exam shows lower extremity weakness which is clearly separate from the pain related restriction and movement  Relevant Results  Reviewed noted hyperbilirubinemia  Assessment/Plan     Assessment & Plan  Acute idiopathic gout of multiple sites  Continue treatment; no plans for procedures on behalf of orthopedics or podiatry  Ambulatory dysfunction    Cellulitis and abscess of left lower extremity  Improving  Alcohol abuse  No withdrawal  Hyperbilirubinemia  Mostly direct check ultrasound  Other specified anemias  He has high MCV; rule out hemolysis    February 19, appreciate input from neurology for MRIs and further neurologic workup.  Will check hemolysis labs and right upper quadrant ultrasound             Raghav Mcdonald MD

## 2025-02-19 NOTE — CARE PLAN
Problem: Skin  Goal: Prevent/manage excess moisture  Outcome: Progressing     Problem: Skin  Goal: Participates in plan/prevention/treatment measures  Outcome: Progressing     Problem: Fall/Injury  Goal: Not fall by end of shift  Outcome: Progressing     Problem: Fall/Injury  Goal: Be free from injury by end of the shift  Outcome: Progressing     Problem: Fall/Injury  Goal: Use assistive devices by end of the shift  Outcome: Progressing     Problem: Pain  Goal: Free from opioid side effects throughout the shift  Outcome: Progressing      The clinical goals for the shift include pt safety.    Over the shift, the patient did make progress toward the following goals. Recommendations to address these barriers include call light in reach, bed alarm set, appropriately medicated, hourly rounding done.

## 2025-02-20 ENCOUNTER — APPOINTMENT (OUTPATIENT)
Dept: RADIOLOGY | Facility: HOSPITAL | Age: 70
End: 2025-02-20
Payer: MEDICAID

## 2025-02-20 LAB
6MAM UR CFM-MCNC: <25 NG/ML
ALBUMIN SERPL BCP-MCNC: 2.9 G/DL (ref 3.4–5)
ALP SERPL-CCNC: 83 U/L (ref 33–136)
ALT SERPL W P-5'-P-CCNC: 25 U/L (ref 10–52)
ANION GAP SERPL CALCULATED.3IONS-SCNC: 11 MMOL/L (ref 10–20)
AST SERPL W P-5'-P-CCNC: 55 U/L (ref 9–39)
BASOPHILS # BLD AUTO: 0.04 X10*3/UL (ref 0–0.1)
BASOPHILS NFR BLD AUTO: 0.7 %
BILIRUB SERPL-MCNC: 1.1 MG/DL (ref 0–1.2)
BUN SERPL-MCNC: 18 MG/DL (ref 6–23)
CALCIUM SERPL-MCNC: 8.2 MG/DL (ref 8.6–10.3)
CHLORIDE SERPL-SCNC: 103 MMOL/L (ref 98–107)
CO2 SERPL-SCNC: 24 MMOL/L (ref 21–32)
CODEINE UR CFM-MCNC: <50 NG/ML
CREAT SERPL-MCNC: 0.68 MG/DL (ref 0.5–1.3)
EGFRCR SERPLBLD CKD-EPI 2021: >90 ML/MIN/1.73M*2
EOSINOPHIL # BLD AUTO: 0.29 X10*3/UL (ref 0–0.7)
EOSINOPHIL NFR BLD AUTO: 4.9 %
ERYTHROCYTE [DISTWIDTH] IN BLOOD BY AUTOMATED COUNT: 14.9 % (ref 11.5–14.5)
GLUCOSE SERPL-MCNC: 92 MG/DL (ref 74–99)
HAPTOGLOB SERPL NEPH-MCNC: 190 MG/DL (ref 30–200)
HCT VFR BLD AUTO: 27.4 % (ref 41–52)
HGB BLD-MCNC: 9 G/DL (ref 13.5–17.5)
HYDROCODONE CTO UR CFM-MCNC: <25 NG/ML
HYDROMORPHONE UR CFM-MCNC: <25 NG/ML
IMM GRANULOCYTES # BLD AUTO: 0.02 X10*3/UL (ref 0–0.7)
IMM GRANULOCYTES NFR BLD AUTO: 0.3 % (ref 0–0.9)
LYMPHOCYTES # BLD AUTO: 1.02 X10*3/UL (ref 1.2–4.8)
LYMPHOCYTES NFR BLD AUTO: 17.3 %
MCH RBC QN AUTO: 36 PG (ref 26–34)
MCHC RBC AUTO-ENTMCNC: 32.8 G/DL (ref 32–36)
MCV RBC AUTO: 110 FL (ref 80–100)
MONOCYTES # BLD AUTO: 0.9 X10*3/UL (ref 0.1–1)
MONOCYTES NFR BLD AUTO: 15.2 %
MORPHINE UR CFM-MCNC: <50 NG/ML
NEUTROPHILS # BLD AUTO: 3.64 X10*3/UL (ref 1.2–7.7)
NEUTROPHILS NFR BLD AUTO: 61.6 %
NORHYDROCODONE UR CFM-MCNC: <25 NG/ML
NOROXYCODONE UR CFM-MCNC: 227 NG/ML
NRBC BLD-RTO: 0 /100 WBCS (ref 0–0)
OXYCODONE UR CFM-MCNC: 794 NG/ML
OXYMORPHONE UR CFM-MCNC: 1080 NG/ML
PLATELET # BLD AUTO: 118 X10*3/UL (ref 150–450)
POTASSIUM SERPL-SCNC: 3.5 MMOL/L (ref 3.5–5.3)
PROT SERPL-MCNC: 6.1 G/DL (ref 6.4–8.2)
RBC # BLD AUTO: 2.5 X10*6/UL (ref 4.5–5.9)
SODIUM SERPL-SCNC: 134 MMOL/L (ref 136–145)
WBC # BLD AUTO: 5.9 X10*3/UL (ref 4.4–11.3)

## 2025-02-20 PROCEDURE — 72148 MRI LUMBAR SPINE W/O DYE: CPT

## 2025-02-20 PROCEDURE — 72148 MRI LUMBAR SPINE W/O DYE: CPT | Performed by: RADIOLOGY

## 2025-02-20 PROCEDURE — 2500000001 HC RX 250 WO HCPCS SELF ADMINISTERED DRUGS (ALT 637 FOR MEDICARE OP): Performed by: INTERNAL MEDICINE

## 2025-02-20 PROCEDURE — 99232 SBSQ HOSP IP/OBS MODERATE 35: CPT | Performed by: INTERNAL MEDICINE

## 2025-02-20 PROCEDURE — 80053 COMPREHEN METABOLIC PANEL: CPT | Performed by: INTERNAL MEDICINE

## 2025-02-20 PROCEDURE — 2500000004 HC RX 250 GENERAL PHARMACY W/ HCPCS (ALT 636 FOR OP/ED)

## 2025-02-20 PROCEDURE — 36415 COLL VENOUS BLD VENIPUNCTURE: CPT | Performed by: INTERNAL MEDICINE

## 2025-02-20 PROCEDURE — 73721 MRI JNT OF LWR EXTRE W/O DYE: CPT | Mod: LEFT SIDE | Performed by: RADIOLOGY

## 2025-02-20 PROCEDURE — 97110 THERAPEUTIC EXERCISES: CPT | Mod: GP,CQ

## 2025-02-20 PROCEDURE — 85025 COMPLETE CBC W/AUTO DIFF WBC: CPT | Performed by: INTERNAL MEDICINE

## 2025-02-20 PROCEDURE — 2500000004 HC RX 250 GENERAL PHARMACY W/ HCPCS (ALT 636 FOR OP/ED): Performed by: INTERNAL MEDICINE

## 2025-02-20 PROCEDURE — 97535 SELF CARE MNGMENT TRAINING: CPT | Mod: GO

## 2025-02-20 PROCEDURE — 1200000002 HC GENERAL ROOM WITH TELEMETRY DAILY

## 2025-02-20 PROCEDURE — 97110 THERAPEUTIC EXERCISES: CPT | Mod: GO

## 2025-02-20 PROCEDURE — 97530 THERAPEUTIC ACTIVITIES: CPT | Mod: GP,CQ

## 2025-02-20 PROCEDURE — 73721 MRI JNT OF LWR EXTRE W/O DYE: CPT | Mod: LT

## 2025-02-20 RX ADMIN — OXYCODONE HYDROCHLORIDE 5 MG: 5 TABLET ORAL at 21:40

## 2025-02-20 RX ADMIN — Medication 1 TABLET: at 10:21

## 2025-02-20 RX ADMIN — COLCHICINE 0.6 MG: 0.6 TABLET, FILM COATED ORAL at 10:21

## 2025-02-20 RX ADMIN — THIAMINE HYDROCHLORIDE 100 MG: 100 INJECTION, SOLUTION INTRAMUSCULAR; INTRAVENOUS at 10:21

## 2025-02-20 RX ADMIN — AMPICILLIN SODIUM AND SULBACTAM SODIUM 3 G: 2; 1 INJECTION, POWDER, FOR SOLUTION INTRAMUSCULAR; INTRAVENOUS at 12:42

## 2025-02-20 RX ADMIN — COLCHICINE 0.6 MG: 0.6 TABLET, FILM COATED ORAL at 21:40

## 2025-02-20 RX ADMIN — AMPICILLIN SODIUM AND SULBACTAM SODIUM 3 G: 2; 1 INJECTION, POWDER, FOR SOLUTION INTRAMUSCULAR; INTRAVENOUS at 05:24

## 2025-02-20 RX ADMIN — AMPICILLIN SODIUM AND SULBACTAM SODIUM 3 G: 2; 1 INJECTION, POWDER, FOR SOLUTION INTRAMUSCULAR; INTRAVENOUS at 23:03

## 2025-02-20 RX ADMIN — ACETAMINOPHEN 650 MG: 325 TABLET, FILM COATED ORAL at 21:40

## 2025-02-20 RX ADMIN — FOLIC ACID 1 MG: 1 TABLET ORAL at 10:21

## 2025-02-20 RX ADMIN — ACETAMINOPHEN 650 MG: 325 TABLET, FILM COATED ORAL at 05:24

## 2025-02-20 RX ADMIN — OXYCODONE HYDROCHLORIDE 5 MG: 5 TABLET ORAL at 05:24

## 2025-02-20 RX ADMIN — VANCOMYCIN 1750 MG: 1.25 INJECTION, SOLUTION INTRAVENOUS at 17:18

## 2025-02-20 RX ADMIN — AMPICILLIN SODIUM AND SULBACTAM SODIUM 3 G: 2; 1 INJECTION, POWDER, FOR SOLUTION INTRAMUSCULAR; INTRAVENOUS at 18:57

## 2025-02-20 RX ADMIN — PANTOPRAZOLE SODIUM 40 MG: 40 TABLET, DELAYED RELEASE ORAL at 06:28

## 2025-02-20 RX ADMIN — ENOXAPARIN SODIUM 40 MG: 40 INJECTION SUBCUTANEOUS at 15:34

## 2025-02-20 RX ADMIN — OXYCODONE HYDROCHLORIDE 5 MG: 5 TABLET ORAL at 12:42

## 2025-02-20 ASSESSMENT — LIFESTYLE VARIABLES
AUDITORY DISTURBANCES: NOT PRESENT
TOTAL SCORE: 0
TOTAL SCORE: 0
HEADACHE, FULLNESS IN HEAD: NOT PRESENT
HEADACHE, FULLNESS IN HEAD: NOT PRESENT
AGITATION: NORMAL ACTIVITY
TREMOR: NO TREMOR
NAUSEA AND VOMITING: NO NAUSEA AND NO VOMITING
AGITATION: NORMAL ACTIVITY
ORIENTATION AND CLOUDING OF SENSORIUM: ORIENTED AND CAN DO SERIAL ADDITIONS
ORIENTATION AND CLOUDING OF SENSORIUM: ORIENTED AND CAN DO SERIAL ADDITIONS
PAROXYSMAL SWEATS: NO SWEAT VISIBLE
HEADACHE, FULLNESS IN HEAD: NOT PRESENT
ANXIETY: NO ANXIETY, AT EASE
VISUAL DISTURBANCES: NOT PRESENT
HEADACHE, FULLNESS IN HEAD: NOT PRESENT
TREMOR: NO TREMOR
VISUAL DISTURBANCES: NOT PRESENT
ORIENTATION AND CLOUDING OF SENSORIUM: ORIENTED AND CAN DO SERIAL ADDITIONS
ANXIETY: NO ANXIETY, AT EASE
TREMOR: NO TREMOR
AGITATION: NORMAL ACTIVITY
TOTAL SCORE: 0
VISUAL DISTURBANCES: NOT PRESENT
PAROXYSMAL SWEATS: NO SWEAT VISIBLE
NAUSEA AND VOMITING: NO NAUSEA AND NO VOMITING
NAUSEA AND VOMITING: NO NAUSEA AND NO VOMITING
PAROXYSMAL SWEATS: NO SWEAT VISIBLE
ORIENTATION AND CLOUDING OF SENSORIUM: ORIENTED AND CAN DO SERIAL ADDITIONS
AUDITORY DISTURBANCES: NOT PRESENT
NAUSEA AND VOMITING: NO NAUSEA AND NO VOMITING
TREMOR: NO TREMOR
AUDITORY DISTURBANCES: NOT PRESENT
AGITATION: NORMAL ACTIVITY
AUDITORY DISTURBANCES: NOT PRESENT
PAROXYSMAL SWEATS: NO SWEAT VISIBLE
ANXIETY: NO ANXIETY, AT EASE
ANXIETY: NO ANXIETY, AT EASE
VISUAL DISTURBANCES: NOT PRESENT
TOTAL SCORE: 0

## 2025-02-20 ASSESSMENT — PAIN SCALES - GENERAL
PAINLEVEL_OUTOF10: 8
PAINLEVEL_OUTOF10: 0 - NO PAIN
PAINLEVEL_OUTOF10: 8
PAINLEVEL_OUTOF10: 8
PAINLEVEL_OUTOF10: 4
PAINLEVEL_OUTOF10: 10 - WORST POSSIBLE PAIN
PAINLEVEL_OUTOF10: 10 - WORST POSSIBLE PAIN
PAINLEVEL_OUTOF10: 3

## 2025-02-20 ASSESSMENT — COGNITIVE AND FUNCTIONAL STATUS - GENERAL
MOVING FROM LYING ON BACK TO SITTING ON SIDE OF FLAT BED WITH BEDRAILS: A LOT
PERSONAL GROOMING: A LITTLE
PERSONAL GROOMING: A LITTLE
MOBILITY SCORE: 13
TURNING FROM BACK TO SIDE WHILE IN FLAT BAD: A LOT
WALKING IN HOSPITAL ROOM: A LOT
TOILETING: TOTAL
STANDING UP FROM CHAIR USING ARMS: A LOT
CLIMB 3 TO 5 STEPS WITH RAILING: TOTAL
PERSONAL GROOMING: A LITTLE
MOVING TO AND FROM BED TO CHAIR: A LOT
MOVING FROM LYING ON BACK TO SITTING ON SIDE OF FLAT BED WITH BEDRAILS: A LITTLE
DRESSING REGULAR LOWER BODY CLOTHING: TOTAL
EATING MEALS: A LITTLE
HELP NEEDED FOR BATHING: A LOT
TURNING FROM BACK TO SIDE WHILE IN FLAT BAD: A LITTLE
WALKING IN HOSPITAL ROOM: TOTAL
TOILETING: A LOT
HELP NEEDED FOR BATHING: A LOT
MOVING FROM LYING ON BACK TO SITTING ON SIDE OF FLAT BED WITH BEDRAILS: A LITTLE
CLIMB 3 TO 5 STEPS WITH RAILING: TOTAL
DRESSING REGULAR LOWER BODY CLOTHING: A LITTLE
MOBILITY SCORE: 11
DRESSING REGULAR UPPER BODY CLOTHING: A LITTLE
DRESSING REGULAR UPPER BODY CLOTHING: A LITTLE
HELP NEEDED FOR BATHING: A LOT
STANDING UP FROM CHAIR USING ARMS: A LOT
DAILY ACTIVITIY SCORE: 13
DRESSING REGULAR UPPER BODY CLOTHING: A LITTLE
MOVING TO AND FROM BED TO CHAIR: A LOT
WALKING IN HOSPITAL ROOM: A LOT
TURNING FROM BACK TO SIDE WHILE IN FLAT BAD: A LITTLE
MOVING TO AND FROM BED TO CHAIR: A LITTLE
STANDING UP FROM CHAIR USING ARMS: A LOT
DAILY ACTIVITIY SCORE: 17
DRESSING REGULAR LOWER BODY CLOTHING: A LITTLE
CLIMB 3 TO 5 STEPS WITH RAILING: TOTAL
TOILETING: A LOT

## 2025-02-20 ASSESSMENT — PAIN DESCRIPTION - DESCRIPTORS: DESCRIPTORS: PRESSURE

## 2025-02-20 ASSESSMENT — PAIN - FUNCTIONAL ASSESSMENT
PAIN_FUNCTIONAL_ASSESSMENT: 0-10

## 2025-02-20 ASSESSMENT — PAIN DESCRIPTION - LOCATION
LOCATION: KNEE
LOCATION: KNEE

## 2025-02-20 ASSESSMENT — ACTIVITIES OF DAILY LIVING (ADL): HOME_MANAGEMENT_TIME_ENTRY: 20

## 2025-02-20 ASSESSMENT — PAIN DESCRIPTION - ORIENTATION
ORIENTATION: RIGHT;LEFT
ORIENTATION: RIGHT;LEFT

## 2025-02-20 NOTE — CARE PLAN
The patient's goals for the shift include      The clinical goals for the shift include pt safety.      Problem: Skin  Goal: Decreased wound size/increased tissue granulation at next dressing change  Outcome: Progressing  Goal: Participates in plan/prevention/treatment measures  Outcome: Progressing  Goal: Prevent/manage excess moisture  Outcome: Progressing  Goal: Prevent/minimize sheer/friction injuries  Outcome: Progressing  Goal: Promote/optimize nutrition  Outcome: Progressing  Goal: Promote skin healing  Outcome: Progressing     Problem: Fall/Injury  Goal: Not fall by end of shift  Outcome: Progressing  Goal: Be free from injury by end of the shift  Outcome: Progressing  Goal: Verbalize understanding of personal risk factors for fall in the hospital  Outcome: Progressing  Goal: Verbalize understanding of risk factor reduction measures to prevent injury from fall in the home  Outcome: Progressing  Goal: Use assistive devices by end of the shift  Outcome: Progressing  Goal: Pace activities to prevent fatigue by end of the shift  Outcome: Progressing     Problem: Pain  Goal: Takes deep breaths with improved pain control throughout the shift  Outcome: Progressing  Goal: Turns in bed with improved pain control throughout the shift  Outcome: Progressing  Goal: Walks with improved pain control throughout the shift  Outcome: Progressing  Goal: Performs ADL's with improved pain control throughout shift  Outcome: Progressing  Goal: Participates in PT with improved pain control throughout the shift  Outcome: Progressing  Goal: Free from opioid side effects throughout the shift  Outcome: Progressing  Goal: Free from acute confusion related to pain meds throughout the shift  Outcome: Progressing

## 2025-02-20 NOTE — PROGRESS NOTES
Jake Banerjee is a 69 y.o. male on day 0 of admission presenting with Acute idiopathic gout of multiple sites.      Subjective   No distress       Objective     Last Recorded Vitals  /66 (BP Location: Right arm, Patient Position: Lying)   Pulse 79   Temp 36.7 °C (98.1 °F) (Temporal)   Resp 18   Wt 70.3 kg (155 lb)   SpO2 94%   Intake/Output last 3 Shifts:    Intake/Output Summary (Last 24 hours) at 2/20/2025 0943  Last data filed at 2/19/2025 1557  Gross per 24 hour   Intake 650 ml   Output --   Net 650 ml       Physical Exam  Alert oriented x 3 cooperative no distress  Chest clear  Heart regular  Abdomen soft nontender no   extremities improving cellulitis and joint swelling except left knee which still has significant swelling and tenderness but no focal redness  Neurologic exam focal but still appreciable weakness  Relevant Results  MRI cervical spine and brain results reviewed a.m. labs reviewed  Assessment/Plan     Assessment & Plan  Acute idiopathic gout of multiple sites  Continue treatment; no plans for procedures on behalf of orthopedics or podiatry  Ambulatory dysfunction  Will need rehab  Cellulitis and abscess of left lower extremity  Improving  Alcohol abuse  No withdrawal  Hyperbilirubinemia  Mostly direct check ultrasound; ultrasound negative  Other specified anemias  He has high MCV; rule out hemolysis; no hemolysis    February 20,     Will discuss with orthopedics to reassess patient and consider draining left knee for which the patient is asking    Check MRI lumbar spine for lower extremity weakness while he is in the hospital  Check MRI left knee           Raghav Mcdonald MD

## 2025-02-20 NOTE — PROGRESS NOTES
02/20/25 1409   Discharge Planning   Expected Discharge Disposition SNF   Does the patient need discharge transport arranged? Yes   RoundTrip coordination needed? Yes   Has discharge transport been arranged? No     Patient reluctant to pick rehabs.   Assisted him with it.  He would rather stay here.  Sent 2 closes Snf's in for referral.   Will need a precert that will take some time.  #[facility], [facility]2 [facility]

## 2025-02-20 NOTE — PROGRESS NOTES
"Jake Banerjee is a 69 y.o. male on day 0 of admission presenting with Acute idiopathic gout of multiple sites.      Subjective   Patient awake alert and oriented to all tells me that he is feels like his legs are not quite as painful and he has a little bit more movement.        Objective     Last Recorded Vitals  Blood pressure 108/62, pulse 66, temperature 36.6 °C (97.9 °F), temperature source Temporal, resp. rate 18, height 1.753 m (5' 9\"), weight 70.3 kg (155 lb), SpO2 99%.  Neurologic exam:     Awake alert oriented to all, no dysarthria, no aphasia  Naming repetition intact, speech is fluent  PERRL, EOMI without ptosis or nystagmus, visual fields intact, face symmetrical, tongue midline  Strength in upper extremities is 5/5 bilateral lower extremities with weakness at the hip 3/5 unable to test knee and ankle due to pain  Sensation is intact  FTN intact NEETA intact  Heel-to-shin unable  Reflexes hyperreflexia, positive Esther's  Toes down going bilaterally  Gait not assessed at this time     Physical Exam  Cardiovascular:      Rate and Rhythm: Normal rate.   Pulmonary:      Effort: Pulmonary effort is normal.   Relevant Results                                                  Assessment/Plan      Assessment & Plan  Acute idiopathic gout of multiple sites    Ambulatory dysfunction   69 y.o. male with history of gout, foreign body in eye, EtOH abuse presenting with inability to ambulate.  Exam with weakness to the bilateral lower extremities at the hip and distal as well as hyperreflexia.  MRI of the brain and cervical spine was unrevealing MRI of the lumbar spine has been ordered by internal med.    Cellulitis and abscess of left lower extremity    Alcohol abuse    Hyperbilirubinemia    Other specified anemias                 Yi Salgado, APRN-CNP  "

## 2025-02-20 NOTE — PROGRESS NOTES
Occupational Therapy    Occupational Therapy Treatment    Name: Jake Banerjee  MRN: [id number]  Department: [address]  Room: [facility]446  Date: 02/20/25  Time Calculation  Start Time: 1417  Stop Time: 1452  Time Calculation (min): 35 min    Assessment:  OT Assessment: Pt would benefit from continued OT services to increase independence in daily taska and funcitonal mobility.  Prognosis: Good  Barriers to Discharge Home: Caregiver assistance, Physical needs  Caregiver Assistance: Patient lives alone and/or does not have reliable caregiver assistance  Physical Needs: 24hr mobility assistance needed, Intermittent ADL assistance needed, High falls risk due to function or environment  Evaluation/Treatment Tolerance: Patient limited by pain  Medical Staff Made Aware: Yes  End of Session Communication: Bedside nurse  End of Session Patient Position: Bed, 3 rail up, Alarm on  Plan:  Treatment Interventions: ADL retraining, Functional transfer training, UE strengthening/ROM, Endurance training, Patient/family training, Equipment evaluation/education, Neuromuscular reeducation, Compensatory technique education  OT Frequency: 3 times per week  OT Discharge Recommendations: Moderate intensity level of continued care  Equipment Recommended upon Discharge: Wheeled walker  OT Recommended Transfer Status: Dependent  OT - OK to Discharge: Yes    Subjective   Previous Visit Info:  OT Last Visit  OT Received On: 02/20/25  General:  General  Reason for Referral: decreased ADL's d/t ac. ideopathic gout of multiple sites  Referred By: Dr. Jackson  Past Medical History Relevant to Rehab: gout, alcohol abuse, Left knee aspiration from October of last year demonstrated uric acid crystals  Family/Caregiver Present: No  Prior to Session Communication: Bedside nurse  Patient Position Received: Bed, 3 rail up, Alarm off, not on at start of session  Preferred Learning Style: visual, verbal  General Comment: Cleared to see by nurse for rx session, pt  agreeable to therapy but declining any OOB  Precautions:  LE Weight Bearing Status: Weight Bearing as Tolerated  Medical Precautions: Fall precautions  Precautions Comment: WBAT BLE     Date/Time Vitals Session Patient Position Pulse Resp SpO2 BP MAP (mmHg)    02/20/25 1603 --  --  82  16  100 %  111/57  75                Pain Assessment:  Pain Assessment  Pain Assessment: 0-10  0-10 (Numeric) Pain Score: 10 - Worst possible pain  Pain Type: Acute pain  Pain Location: Knee  Pain Orientation: Right, Left  Pain Interventions: Other (Comment) (RN notified)  Response to Interventions: No change in pain     Objective   Cognition:  Overall Cognitive Status: Impaired  Orientation Level: Oriented X4  Cognition Comments: Pt not wanting to move d/t pain, follows commands  Safety/Judgement:  (decreased safety awareness)  Insight: Mild  Processing Speed: Delayed  Activities of Daily Living: Grooming  Grooming Level of Assistance: Setup  Grooming Where Assessed: Bed level  Grooming Comments: to brush teeth, wash face    UE Bathing  UE Bathing Level of Assistance: Setup  UE Bathing Where Assessed: Bed level  UE Bathing Comments: to bathe under arms, declined further bathing    UE Dressing  UE Dressing Level of Assistance: Minimum assistance  UE Dressing Where Assessed: Bed level  UE Dressing Comments: to don. doff gown    Functional Standing Tolerance:     Bed Mobility/Transfers: Bed Mobility  Bed Mobility: Yes  Bed Mobility 1  Bed Mobility 1: Scooting  Level of Assistance 1: Close supervision  Bed Mobility Comments 1: Pt used B arms to scoot himself to HOB     Therapy/Activity: Therapeutic Exercise  Therapeutic Exercise Performed: Yes  Therapeutic Exercise Activity 1: Pt instructed in / demon BUE exercises: B shld flex., B horiz shld abd/ add., B elbow flex/ ext., B forearm sup/ pron., B wrist flex/ ext. x 15 reps w/ verbal cues for proper tech/pacing in order to increase activity tolerance for daily tasks and functional  mobility.      Outcome Measures:  [facility] Daily Activity  Putting on and taking off regular lower body clothing: Total  Bathing (including washing, rinsing, drying): A lot  Putting on and taking off regular upper body clothing: A little  Toileting, which includes using toilet, bedpan or urinal: Total  Taking care of personal grooming such as brushing teeth: A little  Eating Meals: A little  Daily Activity - Total Score: 13        Education Documentation  Home Exercise Program, taught by Olga Pelletier OT at 2/20/2025  4:12 PM.  Learner: Patient  Readiness: Acceptance  Method: Explanation  Response: Needs Reinforcement    ADL Training, taught by Olga Pelletier OT at 2/20/2025  4:12 PM.  Learner: Patient  Readiness: Acceptance  Method: Explanation  Response: Needs Reinforcement    Education Comments  No comments found.      Goals:  Encounter Problems       Encounter Problems (Active)       OT Goals       UB ADLs (Progressing)       Start:  02/19/25    Expected End:  03/19/25       Patient will complete UB ADL tasks, with set-up using AE need in order to increase patient's safety and independence with self-care tasks.          LB ADLs (Progressing)       Start:  02/19/25    Expected End:  03/19/25       Patient will complete LB ADL tasks, with stand by assist using AE need in order to increase patient's safety and independence with self-care tasks.          Functional Transfers (Progressing)       Start:  02/19/25    Expected End:  03/19/25       Patient will complete functional transfers with stand by assist using least restrictive device, in order to increase patient's safety and independence with daily tasks.          Standing Tolerance  (Progressing)       Start:  02/19/25    Expected End:  03/19/25       Patient will demonstrate the ability to stand at least >/= 1 minute in order to increase safety and independence with daily tasks.         Activity Tolerance  (Progressing)       Start:  02/19/25    Expected  End:  03/19/25       Patient will demonstrate the ability to participate in functional activity at least >/= 25 minutes in order to increase patient's safety and independence with daily tasks.

## 2025-02-20 NOTE — ASSESSMENT & PLAN NOTE
69 y.o. male with history of gout, foreign body in eye, EtOH abuse presenting with inability to ambulate.  Exam with weakness to the bilateral lower extremities at the hip and distal as well as hyperreflexia.  MRI of the brain and cervical spine was unrevealing MRI of the lumbar spine has been ordered by internal med.

## 2025-02-20 NOTE — PROGRESS NOTES
Jake Banerjee is a 69 y.o. male on day 0 of admission presenting with Acute idiopathic gout of multiple sites.    Subjective   Interval History:   Reports interval improvement in leg pain  Afebrile, no chills  Denies chest pain no shortness of breath  Denies nausea vomiting or diarrhea  MRI cervical spine and brain reviewed       Objective   Range of Vitals (last 24 hours)  Heart Rate:  [66-91]   Temp:  [36.6 °C (97.9 °F)-37.5 °C (99.5 °F)]   Resp:  [16-18]   BP: ()/(51-76)   SpO2:  [94 %-99 %]   Daily Weight  02/18/25 : 70.3 kg (155 lb)    Body mass index is 22.89 kg/m².    Physical Exam  Constitutional:       Appearance: Normal appearance.   HENT:      Head: Normocephalic and atraumatic.      Nose: Nose normal.      Mouth/Throat:      Mouth: Mucous membranes are moist.      Pharynx: Oropharynx is clear.   Eyes:      General: No scleral icterus.  Cardiovascular:      Rate and Rhythm: Normal rate and regular rhythm.   Pulmonary:      Effort: Pulmonary effort is normal.      Breath sounds: Normal breath sounds.   Abdominal:      General: Bowel sounds are normal.      Palpations: Abdomen is soft.   Musculoskeletal:         General: Normal range of motion.      Cervical back: Normal range of motion and neck supple.      Comments: Bilateral knee and ankle swelling  Right hallux swelling, mild erythema    Skin:     Comments: Left leg erythema, calor  Left hallux black discoloration under nail bed   Neurological:      General: No focal deficit present.      Mental Status: He is alert.   Psychiatric:         Mood and Affect: Mood normal.         Behavior: Behavior normal.     Antibiotics  ampicillin-sulbactam - 3 gram/100 mL  vancomycin - 1.75 gram/350 mL    Relevant Results  Labs  Results from last 72 hours   Lab Units 02/20/25  0654 02/19/25  0518 02/18/25  0911   WBC AUTO x10*3/uL 5.9 8.3 9.5   HEMOGLOBIN g/dL 9.0* 9.5* 10.8*   HEMATOCRIT % 27.4* 29.2* 31.8*   PLATELETS AUTO x10*3/uL 118* 107* 128*   NEUTROS PCT  AUTO % 61.6 74.6 78.1   LYMPHS PCT AUTO % 17.3 10.0 6.6   MONOS PCT AUTO % 15.2 13.1 14.5   EOS PCT AUTO % 4.9 1.3 0.2     Results from last 72 hours   Lab Units 02/20/25  0654 02/19/25  0518 02/18/25  0911   SODIUM mmol/L 134* 136 135*   POTASSIUM mmol/L 3.5 3.7 3.9   CHLORIDE mmol/L 103 104 102   CO2 mmol/L 24 26 25   BUN mg/dL 18 19 16   CREATININE mg/dL 0.68 0.73 0.80   GLUCOSE mg/dL 92 113* 139*   CALCIUM mg/dL 8.2* 8.7 9.1   ANION GAP mmol/L 11 10 12   EGFR mL/min/1.73m*2 >90 >90 >90     Results from last 72 hours   Lab Units 02/20/25  0654 02/19/25  0518 02/18/25  1459   ALK PHOS U/L 83 91 92   BILIRUBIN TOTAL mg/dL 1.1 1.7* 4.4*   BILIRUBIN DIRECT mg/dL  --   --  1.2*   PROTEIN TOTAL g/dL 6.1* 6.4 8.1   ALT U/L 25 20 23   AST U/L 55* 37 37   ALBUMIN g/dL 2.9* 3.0* 3.8     Estimated Creatinine Clearance: 104.8 mL/min (by C-G formula based on SCr of 0.68 mg/dL).  C-Reactive Protein   Date Value Ref Range Status   02/18/2025 10.91 (H) <1.00 mg/dL Final     CRP   Date Value Ref Range Status   09/06/2023 6.4 (H) 0 - 2.0 MG/DL Final     Comment:     Performed at [address] [address]     Microbiology  Reviewed-blood cultures pending  Imaging  US abdomen limited liver    Result Date: 2/19/2025  Interpreted By:  Vamsi Krishna, STUDY: US ABDOMEN LIMITED LIVER;  2/19/2025 3:40 pm   INDICATION: Signs/Symptoms:High bilirubin.     COMPARISON: None.   ACCESSION NUMBER(S): [id number]   ORDERING CLINICIAN: DEVANG ROMERO   TECHNIQUE: Real-time sonographic evaluation of the right upper quadrant was performed.   FINDINGS: LIVER: The liver is diffusely echogenic in appearance with impaired acoustic penetration, consistent with diffuse fatty infiltration.  Liver is enlarged measuring 19.6 cm in sagittal dimension.   GALLBLADDER: The gallbladder is nondistended and demonstrates no evidence of gallstones, wall thickening or surrounding fluid. Sonographic Decker's sign is negative.   BILIARY TREE:  Common bile duct is not dilated measuring 2 mm in diameter.   PANCREAS: The pancreas is poorly visualized due to overlying bowel gas.   RIGHT KIDNEY: Right kidney measures  10.3cm in length.  No right hydronephrosis is seen.       Hepatomegaly and probable fatty infiltration of the liver.   Unremarkable gallbladder. No biliary dilation.   Sub visualization of the pancreas due to bowel gas.   MACRO: None.   Signed by: Vamsi Krishna 2/19/2025 3:50 PM Dictation workstation:   [id number]    XR knee 4+ views bilateral    Result Date: 2/18/2025  Interpreted By:  Flavio Arceo, STUDY: XR KNEE 4+ VIEWS BILATERAL; 2/18/2025 4:14 pm   INDICATION: Signs/Symptoms:Pain and swelling both knees   COMPARISON: None.   ACCESSION NUMBER(S): [id number]   ORDERING CLINICIAN: DEVANG ROMERO   TECHNIQUE: Number of films: 8 view radiographs of the knees bilaterally.   FINDINGS: No fractures or destructive lesions are identified. The joint spaces and articular surfaces are maintained. The alignment is anatomic. The soft tissues are unremarkable. There is small right and moderate right suprapatellar joint effusion.       Small right and moderate left suprapatellar joint effusions without underlying fracture or subluxation. No radiographic evidence of acute osteomyelitis.   Signed by: Flavio Arceo 2/18/2025 4:57 PM Dictation workstation:   [id number]    XR ankle bilateral complete minimum 3 views    Result Date: 2/18/2025  Interpreted By:  Flavio Arceo, STUDY: XR ANKLE BILATERAL COMPLETE MINIMUM 3 VIEWS 2/18/2025 4:14 pm   INDICATION: Signs/Symptoms:Rule out arthritis vs infection Injury/Pain   COMPARISON: Left knee from 2017   ACCESSION NUMBER(S): [id number]   ORDERING CLINICIAN: DEVANG ROMERO   TECHNIQUE: Number of films: 6 view radiographs of the ankles bilaterally.   FINDINGS: There is no fracture, blastic or lytic bone lesion. No abnormal periosteal reaction is identified. The ankle mortise and subtalar joints  are intact. The alignment is anatomic. Small calcaneal spurs are seen bilaterally. Mild bilateral soft tissue swelling is noted, without abnormal gas collections or radiopaque foreign bodies.       Mild bilateral soft tissue swelling. No radiographic evidence of acute osteomyelitis.   Signed by: Flavoi Arceo 2/18/2025 4:55 PM Dictation workstation:   [id number]    Vascular US Lower Extremity Venous Duplex Bilateral    Result Date: 2/18/2025  Interpreted By:  Vamsi Krishna, STUDY: VASC US LOWER EXTREMITY VENOUS DUPLEX BILATERAL  2/18/2025 3:55 pm   INDICATION: Swelling and pain for 5 days.   ,R60.9 Edema, unspecified   COMPARISON: 09/06/2023.   ACCESSION NUMBER(S): [id number]   ORDERING CLINICIAN: DEVANG ROMERO   TECHNIQUE: Routine ultrasound of the  bilateral lower extremity was performed with duplex Doppler (color and spectral) evaluation.  Static images were obtained for remote interpretation.   FINDINGS: THIGH VEINS: The  bilateral common femoral, femoral, popliteal, proximal medial saphenous, and deep femoral veins are patent and free of thrombus. The veins are normally compressible.  They demonstrate normal phasic flow and augmentation response.   Within the right popliteal fossa there is a small hypoechoic cystic lesion or fluid collection measuring 1.4 x 0.5 x 0.6 cm.   CALF VEINS: The paired peroneal and posterior tibial calf veins are patent.         No evidence of deep venous thrombosis of the  bilateral lower extremity.   Nonspecific small hypoechoic cystic lesion or fluid collection of the right popliteal fossa.       MACRO: None.   Signed by: Vamsi Krishna 2/18/2025 4:01 PM Dictation workstation:   [id number]    XR tibia fibula left 2 views    Result Date: 2/18/2025  Interpreted By:  Ruddy Gage, STUDY: XR TIBIA FIBULA LEFT 2 VIEWS; 2/18/2025 9:32 am   INDICATION: Signs/Symptoms:swelling, pain.   COMPARISON: None.   ACCESSION NUMBER(S): [id number]   ORDERING CLINICIAN: BIPIN  HOLLI   TECHNIQUE: Left tib fib two views   FINDINGS: No fractures or destructive lesions are identified. There is a small plantar calcaneal spur incidentally noted.       No acute pathologic findings are identified.   MACRO: none   Signed by: Ruddy Gage 2/18/2025 10:08 AM Dictation workstation:   [id number]    Assessment/Plan   Left leg cellulitis, resolving  Bilateral knee and ankle swelling/pain-gout, inflammation verses infection         IV Unasyn  IV vancomycin   Follow up blood cultures  Monitor temperature and WBC  Supportive care  Follow up MRI left knee/lumbar spine  Orthopedic follow-up  Podiatry follow-up  Further recommendations based on workup    Total time spent caring for the patient today was 20 minutes. This includes time spent before the visit reviewing the chart, time spent during the visit, and time spent after the visit on documentation.     Flora Jiménez, TOMER-CNP

## 2025-02-20 NOTE — NURSING NOTE
Attempted to reposition patient, patient refused to be turned at this time, Educated about risk of pressure injuries.

## 2025-02-20 NOTE — CARE PLAN
Problem: Skin  Goal: Decreased wound size/increased tissue granulation at next dressing change  Outcome: Progressing  Goal: Participates in plan/prevention/treatment measures  Outcome: Progressing  Goal: Prevent/manage excess moisture  Outcome: Progressing  Goal: Prevent/minimize sheer/friction injuries  Outcome: Progressing  Goal: Promote/optimize nutrition  Outcome: Progressing  Goal: Promote skin healing  Outcome: Progressing   The patient's goals for the shift include pain control.     The clinical goals for the shift include pt safety.

## 2025-02-20 NOTE — PROGRESS NOTES
Physical Therapy    Physical Therapy Treatment    Patient Name: Jake Banerjee  MRN: [id number]  Department: [address]  Room: [address] [place] [address]-  Today's Date: 2/20/2025  Time Calculation  Start Time: 0835  Stop Time: 0920  Time Calculation (min): 45 min         Assessment/Plan   PT Assessment  PT Assessment Results: Decreased strength, Decreased range of motion, Decreased endurance, Impaired balance, Decreased mobility, Decreased cognition, Impaired judgement, Decreased safety awareness, Decreased skin integrity, Pain  Rehab Prognosis: Fair  Barriers to Discharge Home: Caregiver assistance, Cognition needs, Physical needs  Caregiver Assistance: Caregiver assistance needed per identified barriers - however, level of patient's required assistance exceeds assistance available at home  Cognition Needs: Other (Comment) (Limited by pain)  Physical Needs: High falls risk due to function or environment, 24hr ADL assistance needed, 24hr mobility assistance needed  Evaluation/Treatment Tolerance: Patient limited by pain  Medical Staff Made Aware: Yes  Strengths: Premorbid level of function  Barriers to Participation: Comorbidities  End of Session Communication: Bedside nurse  Assessment Comment: Was able to stand for 3 minutes first minute with Min A x 2 Next 2 minutes with RW with contact guard assist with gait belt Not allowing trherpaist to assist with B LE out of bed/ into bed Using B UEs/ bedrails with extra time/ effort to complete  End of Session Patient Position: Bed, 3 rail up, Alarm on (Needs at reach)     PT Plan  Treatment/Interventions: Bed mobility, Transfer training, Gait training, Balance training, Strengthening, Neuromuscular re-education, Endurance training, Therapeutic exercise, Therapeutic activity  PT Plan: Ongoing PT  PT Frequency: 3 times per week  PT Discharge Recommendations: Moderate intensity level of continued care  Equipment Recommended upon Discharge: Wheeled walker  PT Recommended Transfer Status: Assist x2,  Assistive device  PT - OK to Discharge: Yes      General Visit Information:   PT  Visit  PT Received On: 02/20/25  General  Reason for Referral: impaired mobility,acute idopathic gout of multiple sites  Referred By: Dr. Jackson  Past Medical History Relevant to Rehab: gout, alcohol abuse, Left knee aspiration from October of last year demonstrated uric acid crystals  Prior to Session Communication: Bedside nurse  Patient Position Received: Bed, 3 rail up, Alarm on  Preferred Learning Style: verbal, visual  General Comment: Cleared by nurse to see. Patient agreeable to therapy    Subjective   Precautions:  Precautions  LE Weight Bearing Status: Weight Bearing as Tolerated  Medical Precautions: Fall precautions  Precautions Comment: WBAT B LE     Date/Time Vitals Session Patient Position Pulse Resp SpO2 BP MAP (mmHg)    02/20/25 0826 --  --  79  18  94 %  112/66  81                 Objective   Pain:  Pain Assessment  Pain Assessment: 0-10  0-10 (Numeric) Pain Score: 8  Pain Type: Acute pain  Pain Location: Knee  Pain Orientation: Left, Right  Pain Descriptors: Pressure  Pain Frequency: Constant/continuous  Pain Onset: Ongoing  Cognition:  Cognition  Overall Cognitive Status: Impaired  Orientation Level: Oriented X4  Cognition Comments: Patient hesistant to move Follows some commands Patient resisitant to education  Safety/Judgement:  (Decreased safety)  Insight: Mild  Processing Speed: Delayed  Coordination:  Coordination Comment: slow, effortful movements Using B UEs to move B LEs  Postural Control:  Static Sitting Balance  Static Sitting-Balance Support: Bilateral upper extremity supported  Static Sitting-Level of Assistance: Close supervision  Static Sitting-Comment/Number of Minutes: EOB x 10 minutes  Dynamic Standing Balance  Dynamic Standing-Balance Support: Bilateral upper extremity supported  Dynamic Standing-Level of Assistance: Minimum assistance (x 2)  Dynamic Standing-Comments: Min A x 2 to stand Stood for  3 minutes at bedside with RW with contact guard assist after first minute of standing  Extremity/Trunk Assessments:    Activity Tolerance:  Activity Tolerance  Endurance: Decreased tolerance for upright activites  Activity Tolerance Comments: Patient limited by pain  Treatments:  Therapeutic Exercise  Therapeutic Exercise Performed: Yes  Therapeutic Exercise Activity 1: B LE seated ther ex with B LEs on stool with bed elevated to comfortable height for B LEs: toe raisesx 10 Patient using B UEs to move B LE seated : LAQs, hip flexion x 10 effortful but able to due with B UEs    Therapeutic Activity  Therapeutic Activity Performed: Yes  Therapeutic Activity 1: Sat EOB with close supervision x 10 minutes using B UE to move B LEs to EOB with extra time effort while seated EOB.Stood with with RW with Min A x 2 After one minute with B Min A patient able to stand last 2 minutes with contact guard assist STS to bed with contact guard assist Able to complete B LEs into bed with extra time /effort able to complete    Bed Mobility  Bed Mobility: Yes  Bed Mobility 1  Bed Mobility 1: Supine to sitting  Level of Assistance 1: Contact guard  Bed Mobility Comments 1: Patient using B UEs to move B LEs over the EOB with extra time/ effort to complete Increased pain reported Therapist instructed not to asisst  Bed Mobility 2  Bed Mobility  2: Sitting to supine  Level of Assistance 2: Close supervision  Bed Mobility Comments 2: Patientn not wanting therapist to assist with B LEs back into bed Extra time/ effort for patient to complete using B UEs Iincreased pain reported B LEs  Bed Mobility 3  Bed Mobility 3: Scooting  Level of Assistance 3: Close supervision  Bed Mobility Comments 3: significant amount of time for patient to scoot his buttocks to the HOB with  use of bed rails    Ambulation/Gait Training  Ambulation/Gait Training Performed: No (Patient declined to side step of walk)  Transfers  Transfer: Yes  Transfer 1  Transfer From  1: Bed to  Transfer to 1: Stand  Technique 1: Sit to stand  Transfer Device 1: Walker  Transfer Level of Assistance 1: Minimum assistance, +2  Trials/Comments 1: STS with RW with Min A x 2 with gait belt for first one minute then able ro stand additional 2 minutes with contact guard assist  Transfers 2  Transfer From 2: Stand to  Transfer to 2: Sit, Bed  Technique 2: Stand to sit  Transfer Device 2: Walker  Transfer Level of Assistance 2: Contact guard  Trials/Comments 2: STS to bed with contact gura d assist Holding gait belt for patient to complete    Outcome Measures:  [facility] Basic Mobility  Turning from your back to your side while in a flat bed without using bedrails: A lot  Moving from lying on your back to sitting on the side of a flat bed without using bedrails: A lot  Moving to and from bed to chair (including a wheelchair): A little  Standing up from a chair using your arms (e.g. wheelchair or bedside chair): A lot  To walk in hospital room: Total  Climbing 3-5 steps with railing: Total  Basic Mobility - Total Score: 11    Education Documentation  Body Mechanics, taught by Mery Bautista PTA at 2/20/2025  9:46 AM.  Learner: Patient  Readiness: Acceptance  Method: Explanation  Response: Refused Teaching, No Evidence of Learning, Needs Reinforcement    Home Exercise Program, taught by Mery Bautista PTA at 2/20/2025  9:46 AM.  Learner: Patient  Readiness: Acceptance  Method: Explanation  Response: Refused Teaching, No Evidence of Learning, Needs Reinforcement    Mobility Training, taught by Mery Bautista PTA at 2/20/2025  9:46 AM.  Learner: Patient  Readiness: Acceptance  Method: Explanation  Response: Refused Teaching, No Evidence of Learning, Needs Reinforcement    Education Comments  No comments found.        OP EDUCATION:       Encounter Problems       Encounter Problems (Active)       Balance       Standing Balance (Progressing)       Start:  02/18/25    Expected End:  03/04/25       Pt will  demonstrate good static standing balance to promote safe participation with out of bed activity, transfers, and mobility           Standing Balance (Progressing)       Start:  02/18/25    Expected End:  03/04/25       Pt will demonstrate good static standing balance to promote safe participation with out of bed activity, transfers, and mobility              Mobility       Ambulation (Progressing)       Start:  02/18/25    Expected End:  03/04/25       Pt will ambulate 50' modified independent assist with LRD to promote safe home mobility           Entry Stair Negotiation (Progressing)       Start:  02/18/25    Expected End:  03/04/25       Pt will ascend/descend 3 stairs with rail(s) on L and modified independent assist to promote safe entry and exit in home environment                PT Transfers       Supine to sit (Progressing)       Start:  02/18/25    Expected End:  03/04/25       Pt will transfer supine to sitting at edge of bed with modified independent assist to promote acute care out of bed activity           Sit to stand (Progressing)       Start:  02/18/25    Expected End:  03/04/25       Pt will transfer sit to standing position with modified independent assist and walker to promote safe out of bed activity              Safety       Safe Mobility Techniques (Progressing)       Start:  02/18/25    Expected End:  03/04/25       Pt will correctly identify and demonstrate safe mobility techniques to reduce their risks for falls during their acute care stay

## 2025-02-21 LAB
ALBUMIN SERPL BCP-MCNC: 2.9 G/DL (ref 3.4–5)
ALP SERPL-CCNC: 91 U/L (ref 33–136)
ALT SERPL W P-5'-P-CCNC: 30 U/L (ref 10–52)
ANION GAP SERPL CALCULATED.3IONS-SCNC: 9 MMOL/L (ref 10–20)
AST SERPL W P-5'-P-CCNC: 65 U/L (ref 9–39)
BASOPHILS # BLD AUTO: 0.04 X10*3/UL (ref 0–0.1)
BASOPHILS NFR BLD AUTO: 1 %
BILIRUB SERPL-MCNC: 0.9 MG/DL (ref 0–1.2)
BUN SERPL-MCNC: 13 MG/DL (ref 6–23)
CALCIUM SERPL-MCNC: 8 MG/DL (ref 8.6–10.3)
CHLORIDE SERPL-SCNC: 102 MMOL/L (ref 98–107)
CLARITY FLD: ABNORMAL
CO2 SERPL-SCNC: 28 MMOL/L (ref 21–32)
COLOR FLD: ABNORMAL
CREAT SERPL-MCNC: 0.68 MG/DL (ref 0.5–1.3)
EGFRCR SERPLBLD CKD-EPI 2021: >90 ML/MIN/1.73M*2
EOSINOPHIL # BLD AUTO: 0.3 X10*3/UL (ref 0–0.7)
EOSINOPHIL NFR BLD AUTO: 7.1 %
ERYTHROCYTE [DISTWIDTH] IN BLOOD BY AUTOMATED COUNT: 14.6 % (ref 11.5–14.5)
GLUCOSE SERPL-MCNC: 102 MG/DL (ref 74–99)
HCT VFR BLD AUTO: 28.3 % (ref 41–52)
HGB BLD-MCNC: 9.1 G/DL (ref 13.5–17.5)
IMM GRANULOCYTES # BLD AUTO: 0.01 X10*3/UL (ref 0–0.7)
IMM GRANULOCYTES NFR BLD AUTO: 0.2 % (ref 0–0.9)
LYMPHOCYTES # BLD AUTO: 0.86 X10*3/UL (ref 1.2–4.8)
LYMPHOCYTES NFR BLD AUTO: 20.5 %
MCH RBC QN AUTO: 35.5 PG (ref 26–34)
MCHC RBC AUTO-ENTMCNC: 32.2 G/DL (ref 32–36)
MCV RBC AUTO: 111 FL (ref 80–100)
MONOCYTES # BLD AUTO: 0.7 X10*3/UL (ref 0.1–1)
MONOCYTES NFR BLD AUTO: 16.7 %
NEUTROPHILS # BLD AUTO: 2.29 X10*3/UL (ref 1.2–7.7)
NEUTROPHILS NFR BLD AUTO: 54.5 %
NRBC BLD-RTO: 0 /100 WBCS (ref 0–0)
PLATELET # BLD AUTO: 122 X10*3/UL (ref 150–450)
POTASSIUM SERPL-SCNC: 3.4 MMOL/L (ref 3.5–5.3)
PROT SERPL-MCNC: 6.1 G/DL (ref 6.4–8.2)
RBC # BLD AUTO: 2.56 X10*6/UL (ref 4.5–5.9)
RBC # FLD AUTO: ABNORMAL /UL
SODIUM SERPL-SCNC: 136 MMOL/L (ref 136–145)
WBC # BLD AUTO: 4.2 X10*3/UL (ref 4.4–11.3)
WBC # FLD AUTO: ABNORMAL /UL

## 2025-02-21 PROCEDURE — 2500000004 HC RX 250 GENERAL PHARMACY W/ HCPCS (ALT 636 FOR OP/ED)

## 2025-02-21 PROCEDURE — 2500000001 HC RX 250 WO HCPCS SELF ADMINISTERED DRUGS (ALT 637 FOR MEDICARE OP): Performed by: INTERNAL MEDICINE

## 2025-02-21 PROCEDURE — 97116 GAIT TRAINING THERAPY: CPT | Mod: GP,CQ

## 2025-02-21 PROCEDURE — 99232 SBSQ HOSP IP/OBS MODERATE 35: CPT | Performed by: INTERNAL MEDICINE

## 2025-02-21 PROCEDURE — 1200000002 HC GENERAL ROOM WITH TELEMETRY DAILY

## 2025-02-21 PROCEDURE — 97110 THERAPEUTIC EXERCISES: CPT | Mod: GP,CQ

## 2025-02-21 PROCEDURE — 87075 CULTR BACTERIA EXCEPT BLOOD: CPT | Mod: TRILAB | Performed by: INTERNAL MEDICINE

## 2025-02-21 PROCEDURE — 97530 THERAPEUTIC ACTIVITIES: CPT | Mod: GP,CQ

## 2025-02-21 PROCEDURE — 36415 COLL VENOUS BLD VENIPUNCTURE: CPT | Performed by: INTERNAL MEDICINE

## 2025-02-21 PROCEDURE — 80053 COMPREHEN METABOLIC PANEL: CPT | Performed by: INTERNAL MEDICINE

## 2025-02-21 PROCEDURE — 85025 COMPLETE CBC W/AUTO DIFF WBC: CPT | Performed by: INTERNAL MEDICINE

## 2025-02-21 PROCEDURE — 89060 EXAM SYNOVIAL FLUID CRYSTALS: CPT | Mod: TRILAB | Performed by: INTERNAL MEDICINE

## 2025-02-21 PROCEDURE — 89050 BODY FLUID CELL COUNT: CPT | Performed by: INTERNAL MEDICINE

## 2025-02-21 PROCEDURE — 2500000004 HC RX 250 GENERAL PHARMACY W/ HCPCS (ALT 636 FOR OP/ED): Performed by: INTERNAL MEDICINE

## 2025-02-21 RX ADMIN — AMPICILLIN SODIUM AND SULBACTAM SODIUM 3 G: 2; 1 INJECTION, POWDER, FOR SOLUTION INTRAMUSCULAR; INTRAVENOUS at 05:50

## 2025-02-21 RX ADMIN — COLCHICINE 0.6 MG: 0.6 TABLET, FILM COATED ORAL at 20:26

## 2025-02-21 RX ADMIN — OXYCODONE HYDROCHLORIDE 5 MG: 5 TABLET ORAL at 20:30

## 2025-02-21 RX ADMIN — ENOXAPARIN SODIUM 40 MG: 40 INJECTION SUBCUTANEOUS at 13:15

## 2025-02-21 RX ADMIN — POLYETHYLENE GLYCOL 3350 17 G: 17 POWDER, FOR SOLUTION ORAL at 08:51

## 2025-02-21 RX ADMIN — Medication 100 MG: at 13:15

## 2025-02-21 RX ADMIN — ACETAMINOPHEN 650 MG: 325 TABLET, FILM COATED ORAL at 08:13

## 2025-02-21 RX ADMIN — VANCOMYCIN 1750 MG: 1.25 INJECTION, SOLUTION INTRAVENOUS at 17:48

## 2025-02-21 RX ADMIN — PANTOPRAZOLE SODIUM 40 MG: 40 TABLET, DELAYED RELEASE ORAL at 06:01

## 2025-02-21 RX ADMIN — AMPICILLIN SODIUM AND SULBACTAM SODIUM 3 G: 2; 1 INJECTION, POWDER, FOR SOLUTION INTRAMUSCULAR; INTRAVENOUS at 10:59

## 2025-02-21 RX ADMIN — FOLIC ACID 1 MG: 1 TABLET ORAL at 08:51

## 2025-02-21 RX ADMIN — COLCHICINE 0.6 MG: 0.6 TABLET, FILM COATED ORAL at 08:51

## 2025-02-21 RX ADMIN — AMPICILLIN SODIUM AND SULBACTAM SODIUM 3 G: 2; 1 INJECTION, POWDER, FOR SOLUTION INTRAMUSCULAR; INTRAVENOUS at 16:37

## 2025-02-21 RX ADMIN — Medication 1 TABLET: at 08:51

## 2025-02-21 RX ADMIN — AMPICILLIN SODIUM AND SULBACTAM SODIUM 3 G: 2; 1 INJECTION, POWDER, FOR SOLUTION INTRAMUSCULAR; INTRAVENOUS at 22:30

## 2025-02-21 ASSESSMENT — LIFESTYLE VARIABLES
ORIENTATION AND CLOUDING OF SENSORIUM: ORIENTED AND CAN DO SERIAL ADDITIONS
PAROXYSMAL SWEATS: NO SWEAT VISIBLE
TOTAL SCORE: 0
TOTAL SCORE: 0
AGITATION: NORMAL ACTIVITY
HEADACHE, FULLNESS IN HEAD: NOT PRESENT
PAROXYSMAL SWEATS: NO SWEAT VISIBLE
AUDITORY DISTURBANCES: NOT PRESENT
ANXIETY: NO ANXIETY, AT EASE
NAUSEA AND VOMITING: NO NAUSEA AND NO VOMITING
HEADACHE, FULLNESS IN HEAD: NOT PRESENT
VISUAL DISTURBANCES: NOT PRESENT
NAUSEA AND VOMITING: NO NAUSEA AND NO VOMITING
TREMOR: NO TREMOR
ANXIETY: NO ANXIETY, AT EASE
ORIENTATION AND CLOUDING OF SENSORIUM: ORIENTED AND CAN DO SERIAL ADDITIONS
AUDITORY DISTURBANCES: NOT PRESENT
TREMOR: NO TREMOR
AGITATION: NORMAL ACTIVITY
VISUAL DISTURBANCES: NOT PRESENT

## 2025-02-21 ASSESSMENT — COGNITIVE AND FUNCTIONAL STATUS - GENERAL
MOVING FROM LYING ON BACK TO SITTING ON SIDE OF FLAT BED WITH BEDRAILS: A LITTLE
DRESSING REGULAR LOWER BODY CLOTHING: A LITTLE
WALKING IN HOSPITAL ROOM: A LOT
TURNING FROM BACK TO SIDE WHILE IN FLAT BAD: A LITTLE
DRESSING REGULAR UPPER BODY CLOTHING: A LITTLE
WALKING IN HOSPITAL ROOM: TOTAL
STANDING UP FROM CHAIR USING ARMS: A LOT
CLIMB 3 TO 5 STEPS WITH RAILING: TOTAL
MOBILITY SCORE: 13
CLIMB 3 TO 5 STEPS WITH RAILING: TOTAL
PERSONAL GROOMING: A LITTLE
MOVING TO AND FROM BED TO CHAIR: A LOT
MOBILITY SCORE: 12
HELP NEEDED FOR BATHING: A LOT
STANDING UP FROM CHAIR USING ARMS: A LOT
TURNING FROM BACK TO SIDE WHILE IN FLAT BAD: A LITTLE
MOVING FROM LYING ON BACK TO SITTING ON SIDE OF FLAT BED WITH BEDRAILS: A LITTLE
MOVING TO AND FROM BED TO CHAIR: A LOT
TOILETING: A LOT
DAILY ACTIVITIY SCORE: 17

## 2025-02-21 ASSESSMENT — PAIN - FUNCTIONAL ASSESSMENT
PAIN_FUNCTIONAL_ASSESSMENT: 0-10
PAIN_FUNCTIONAL_ASSESSMENT: UNABLE TO SELF-REPORT
PAIN_FUNCTIONAL_ASSESSMENT: 0-10
PAIN_FUNCTIONAL_ASSESSMENT: 0-10

## 2025-02-21 ASSESSMENT — PAIN SCALES - GENERAL
PAINLEVEL_OUTOF10: 3
PAINLEVEL_OUTOF10: 6
PAINLEVEL_OUTOF10: 6
PAINLEVEL_OUTOF10: 0 - NO PAIN
PAINLEVEL_OUTOF10: 9
PAINLEVEL_OUTOF10: 8
PAINLEVEL_OUTOF10: 9

## 2025-02-21 ASSESSMENT — PAIN DESCRIPTION - ORIENTATION
ORIENTATION: RIGHT;LEFT
ORIENTATION: RIGHT;LEFT

## 2025-02-21 ASSESSMENT — PAIN DESCRIPTION - LOCATION
LOCATION: KNEE
LOCATION: KNEE

## 2025-02-21 ASSESSMENT — PAIN DESCRIPTION - DESCRIPTORS: DESCRIPTORS: PRESSURE;TIGHTNESS

## 2025-02-21 NOTE — PROGRESS NOTES
Jake Banerjee is a 69 y.o. male on day 1 of admission presenting with Acute idiopathic gout of multiple sites.      Subjective   Still has significant degree of swelling both knees left is worse mild fever noted today    MRIs left knee and lower back completed but not yet read    Still waiting orthopedics come back injuring his left knee?       Objective     Last Recorded Vitals  /60 (BP Location: Right arm, Patient Position: Lying)   Pulse 85   Temp (!) 38.1 °C (100.6 °F) (Temporal)   Resp 16   Wt 70.3 kg (155 lb)   SpO2 95%   Intake/Output last 3 Shifts:    Intake/Output Summary (Last 24 hours) at 2/21/2025 0826  Last data filed at 2/21/2025 0817  Gross per 24 hour   Intake 2250 ml   Output 1150 ml   Net 1100 ml       Physical Exam  Alert oriented x 3 cooperative no distress on room air  Chest clear  Heart regular  Abdomen soft nontender  Extremities still significant swelling both knees no redness left is more swollen  Cellulitis left calf is improved  Ankles still some swelling may be slightly better  Relevant Results  Reviewed  Assessment/Plan     Assessment & Plan  Acute idiopathic gout of multiple sites  Continue treatment; no plans for procedures on behalf of orthopedics or podiatry  Ambulatory dysfunction  Will need rehab  Cellulitis and abscess of left lower extremity  Improving  Alcohol abuse  No withdrawal  Hyperbilirubinemia  Mostly direct check ultrasound; ultrasound negative  Other specified anemias  He has high MCV; rule out hemolysis; no hemolysis    February 21,    Await MRI results of LS spine and left knee.  Await orthopedics to drain left knee  Depending on fluid analysis will consider steroids             Raghav Mcdonald MD

## 2025-02-21 NOTE — PROGRESS NOTES
"Jake Banerjee is a 69 y.o. male on day 1 of admission presenting with Acute idiopathic gout of multiple sites.    Subjective   69-year-old with left knee effusion which is painful.  He was seen by my partner Dr. Byrne.  Symptomatic care was recommended.  Asked by Dr. Saunders for aspiration left knee for analysis and improvement of symptoms     Objective     Physical Exam  Left knee was examined.  He has a moderate-sized knee effusion.  It is not erythematous in nature he has minimal tenderness in the knee.  He can stand fully.  He can flex to 110.  He has a minimal joint line tenderness the knee.  There is no lymphangitic streaking.  Both calves are soft.  He has negative Homans.  Sensations intact distally.  His dorsal pedis pulses palpable.  Last Recorded Vitals  Blood pressure 123/77, pulse 80, temperature 36.3 °C (97.3 °F), temperature source Temporal, resp. rate 18, height 1.753 m (5' 9\"), weight 70.3 kg (155 lb), SpO2 100%.  Intake/Output last 3 Shifts:  I/O last 3 completed shifts:  In: 2150 (30.6 mL/kg) [P.O.:300; IV Piggyback:1850]  Out: 1150 (16.4 mL/kg) [Urine:1150 (0.5 mL/kg/hr)]  Weight: 70.3 kg     Relevant Results      Scheduled medications  ampicillin-sulbactam, 3 g, intravenous, q6h  colchicine, 0.6 mg, oral, BID  enoxaparin, 40 mg, subcutaneous, q24h  influenza, 0.5 mL, intramuscular, During hospitalization  folic acid, 1 mg, oral, Daily  multivitamin with minerals, 1 tablet, oral, Daily  pantoprazole, 40 mg, oral, Daily before breakfast   Or  pantoprazole, 40 mg, intravenous, Daily before breakfast  polyethylene glycol, 17 g, oral, Daily  thiamine, 100 mg, oral, Daily  vancomycin, 1,750 mg, intravenous, q24h MARIE      Continuous medications     PRN medications  PRN medications: acetaminophen, ibuprofen, oxyCODONE, vancomycin  Results for orders placed or performed during the hospital encounter of 02/18/25 (from the past 24 hours)   CBC and Auto Differential   Result Value Ref Range    WBC 4.2 (L) " 4.4 - 11.3 x10*3/uL    nRBC 0.0 0.0 - 0.0 /100 WBCs    RBC 2.56 (L) 4.50 - 5.90 x10*6/uL    Hemoglobin 9.1 (L) 13.5 - 17.5 g/dL    Hematocrit 28.3 (L) 41.0 - 52.0 %     (H) 80 - 100 fL    MCH 35.5 (H) 26.0 - 34.0 pg    MCHC 32.2 32.0 - 36.0 g/dL    RDW 14.6 (H) 11.5 - 14.5 %    Platelets 122 (L) 150 - 450 x10*3/uL    Neutrophils % 54.5 40.0 - 80.0 %    Immature Granulocytes %, Automated 0.2 0.0 - 0.9 %    Lymphocytes % 20.5 13.0 - 44.0 %    Monocytes % 16.7 2.0 - 10.0 %    Eosinophils % 7.1 0.0 - 6.0 %    Basophils % 1.0 0.0 - 2.0 %    Neutrophils Absolute 2.29 1.20 - 7.70 x10*3/uL    Immature Granulocytes Absolute, Automated 0.01 0.00 - 0.70 x10*3/uL    Lymphocytes Absolute 0.86 (L) 1.20 - 4.80 x10*3/uL    Monocytes Absolute 0.70 0.10 - 1.00 x10*3/uL    Eosinophils Absolute 0.30 0.00 - 0.70 x10*3/uL    Basophils Absolute 0.04 0.00 - 0.10 x10*3/uL   Comprehensive Metabolic Panel   Result Value Ref Range    Glucose 102 (H) 74 - 99 mg/dL    Sodium 136 136 - 145 mmol/L    Potassium 3.4 (L) 3.5 - 5.3 mmol/L    Chloride 102 98 - 107 mmol/L    Bicarbonate 28 21 - 32 mmol/L    Anion Gap 9 (L) 10 - 20 mmol/L    Urea Nitrogen 13 6 - 23 mg/dL    Creatinine 0.68 0.50 - 1.30 mg/dL    eGFR >90 >60 mL/min/1.73m*2    Calcium 8.0 (L) 8.6 - 10.3 mg/dL    Albumin 2.9 (L) 3.4 - 5.0 g/dL    Alkaline Phosphatase 91 33 - 136 U/L    Total Protein 6.1 (L) 6.4 - 8.2 g/dL    AST 65 (H) 9 - 39 U/L    Bilirubin, Total 0.9 0.0 - 1.2 mg/dL    ALT 30 10 - 52 U/L                            Assessment/Plan   Assessment & Plan  Acute idiopathic gout of multiple sites    Ambulatory dysfunction    Cellulitis and abscess of left lower extremity    Alcohol abuse    Hyperbilirubinemia    Other specified anemias    Left knee effusion.  Does not appear to be infected in appearance.  Probable gout.    Procedure note:  Under sterile technique, area was prepped with chlorhexidine.  10 cc of straw-colored fluid was obtained.  Sent for culture and  analysis.    Continue symptomatic care.       I spent 10 minutes in the professional and overall care of this patient.      Ryan Adames MD

## 2025-02-21 NOTE — PROGRESS NOTES
Jake Banerjee is a 69 y.o. male on day 1 of admission presenting with Acute idiopathic gout of multiple sites.    Subjective   Interval History:   Reports left knee selling/pain  Denies chest pain no shortness of breath  Denies nausea vomiting or diarrhea  Episode of temp spike recorded 38.1 C    Objective   Range of Vitals (last 24 hours)  Heart Rate:  [66-85]   Temp:  [36.8 °C (98.2 °F)-38.1 °C (100.6 °F)]   Resp:  [16-18]   BP: (103-120)/(57-78)   SpO2:  [95 %-100 %]   Daily Weight  02/18/25 : 70.3 kg (155 lb)    Body mass index is 22.89 kg/m².    Physical Exam  Constitutional:       Appearance: Normal appearance.   HENT:      Head: Normocephalic and atraumatic.      Nose: Nose normal.      Mouth/Throat:      Mouth: Mucous membranes are moist.      Pharynx: Oropharynx is clear.   Eyes:      General: No scleral icterus.  Cardiovascular:      Rate and Rhythm: Normal rate and regular rhythm.   Pulmonary:      Effort: Pulmonary effort is normal.      Breath sounds: Normal breath sounds.   Abdominal:      General: Bowel sounds are normal.      Palpations: Abdomen is soft.   Musculoskeletal:         General: Normal range of motion.      Cervical back: Normal range of motion and neck supple.      Comments: Left knee swelling  Right hallux swelling, mild erythema    Skin:     Comments: Resolved Left leg erythema, calor  Left hallux black discoloration under nail bed   Neurological:      General: No focal deficit present.      Mental Status: He is alert.   Psychiatric:         Mood and Affect: Mood normal.         Behavior: Behavior normal.     Antibiotics  ampicillin-sulbactam - 3 gram/100 mL  vancomycin - 1.75 gram/350 mL    Relevant Results  Labs  Results from last 72 hours   Lab Units 02/21/25  0550 02/20/25  0654 02/19/25  0518   WBC AUTO x10*3/uL 4.2* 5.9 8.3   HEMOGLOBIN g/dL 9.1* 9.0* 9.5*   HEMATOCRIT % 28.3* 27.4* 29.2*   PLATELETS AUTO x10*3/uL 122* 118* 107*   NEUTROS PCT AUTO % 54.5 61.6 74.6   LYMPHS PCT AUTO  % 20.5 17.3 10.0   MONOS PCT AUTO % 16.7 15.2 13.1   EOS PCT AUTO % 7.1 4.9 1.3     Results from last 72 hours   Lab Units 02/21/25  0550 02/20/25  0654 02/19/25  0518   SODIUM mmol/L 136 134* 136   POTASSIUM mmol/L 3.4* 3.5 3.7   CHLORIDE mmol/L 102 103 104   CO2 mmol/L 28 24 26   BUN mg/dL 13 18 19   CREATININE mg/dL 0.68 0.68 0.73   GLUCOSE mg/dL 102* 92 113*   CALCIUM mg/dL 8.0* 8.2* 8.7   ANION GAP mmol/L 9* 11 10   EGFR mL/min/1.73m*2 >90 >90 >90     Results from last 72 hours   Lab Units 02/21/25  0550 02/20/25  0654 02/19/25  0518 02/18/25  1459   ALK PHOS U/L 91 83 91 92   BILIRUBIN TOTAL mg/dL 0.9 1.1 1.7* 4.4*   BILIRUBIN DIRECT mg/dL  --   --   --  1.2*   PROTEIN TOTAL g/dL 6.1* 6.1* 6.4 8.1   ALT U/L 30 25 20 23   AST U/L 65* 55* 37 37   ALBUMIN g/dL 2.9* 2.9* 3.0* 3.8     Estimated Creatinine Clearance: 104.8 mL/min (by C-G formula based on SCr of 0.68 mg/dL).  C-Reactive Protein   Date Value Ref Range Status   02/18/2025 10.91 (H) <1.00 mg/dL Final     CRP   Date Value Ref Range Status   09/06/2023 6.4 (H) 0 - 2.0 MG/DL Final     Comment:     Performed at [address] [address]     Microbiology  Reviewed-blood cultures pending  Imaging  US abdomen limited liver    Result Date: 2/19/2025  Interpreted By:  Vamsi Krishna, STUDY: US ABDOMEN LIMITED LIVER;  2/19/2025 3:40 pm   INDICATION: Signs/Symptoms:High bilirubin.     COMPARISON: None.   ACCESSION NUMBER(S): [id number]   ORDERING CLINICIAN: DEVANG ROMERO   TECHNIQUE: Real-time sonographic evaluation of the right upper quadrant was performed.   FINDINGS: LIVER: The liver is diffusely echogenic in appearance with impaired acoustic penetration, consistent with diffuse fatty infiltration.  Liver is enlarged measuring 19.6 cm in sagittal dimension.   GALLBLADDER: The gallbladder is nondistended and demonstrates no evidence of gallstones, wall thickening or surrounding fluid. Sonographic Decker's sign is negative.   BILIARY  TREE: Common bile duct is not dilated measuring 2 mm in diameter.   PANCREAS: The pancreas is poorly visualized due to overlying bowel gas.   RIGHT KIDNEY: Right kidney measures  10.3cm in length.  No right hydronephrosis is seen.       Hepatomegaly and probable fatty infiltration of the liver.   Unremarkable gallbladder. No biliary dilation.   Sub visualization of the pancreas due to bowel gas.   MACRO: None.   Signed by: Vamsi Krishna 2/19/2025 3:50 PM Dictation workstation:   [id number]    XR knee 4+ views bilateral    Result Date: 2/18/2025  Interpreted By:  Flavio Arceo, STUDY: XR KNEE 4+ VIEWS BILATERAL; 2/18/2025 4:14 pm   INDICATION: Signs/Symptoms:Pain and swelling both knees   COMPARISON: None.   ACCESSION NUMBER(S): [id number]   ORDERING CLINICIAN: DEVANG ROMERO   TECHNIQUE: Number of films: 8 view radiographs of the knees bilaterally.   FINDINGS: No fractures or destructive lesions are identified. The joint spaces and articular surfaces are maintained. The alignment is anatomic. The soft tissues are unremarkable. There is small right and moderate right suprapatellar joint effusion.       Small right and moderate left suprapatellar joint effusions without underlying fracture or subluxation. No radiographic evidence of acute osteomyelitis.   Signed by: Flavio Arceo 2/18/2025 4:57 PM Dictation workstation:   [id number]    XR ankle bilateral complete minimum 3 views    Result Date: 2/18/2025  Interpreted By:  Flavio Arceo, STUDY: XR ANKLE BILATERAL COMPLETE MINIMUM 3 VIEWS 2/18/2025 4:14 pm   INDICATION: Signs/Symptoms:Rule out arthritis vs infection Injury/Pain   COMPARISON: Left knee from 2017   ACCESSION NUMBER(S): [id number]   ORDERING CLINICIAN: DEVANG ROMERO   TECHNIQUE: Number of films: 6 view radiographs of the ankles bilaterally.   FINDINGS: There is no fracture, blastic or lytic bone lesion. No abnormal periosteal reaction is identified. The ankle mortise and subtalar  joints are intact. The alignment is anatomic. Small calcaneal spurs are seen bilaterally. Mild bilateral soft tissue swelling is noted, without abnormal gas collections or radiopaque foreign bodies.       Mild bilateral soft tissue swelling. No radiographic evidence of acute osteomyelitis.   Signed by: Flavio Arceo 2/18/2025 4:55 PM Dictation workstation:   [id number]    Vascular US Lower Extremity Venous Duplex Bilateral    Result Date: 2/18/2025  Interpreted By:  Vamsi Krishna, STUDY: VASC US LOWER EXTREMITY VENOUS DUPLEX BILATERAL  2/18/2025 3:55 pm   INDICATION: Swelling and pain for 5 days.   ,R60.9 Edema, unspecified   COMPARISON: 09/06/2023.   ACCESSION NUMBER(S): [id number]   ORDERING CLINICIAN: DEVANG ROMERO   TECHNIQUE: Routine ultrasound of the  bilateral lower extremity was performed with duplex Doppler (color and spectral) evaluation.  Static images were obtained for remote interpretation.   FINDINGS: THIGH VEINS: The  bilateral common femoral, femoral, popliteal, proximal medial saphenous, and deep femoral veins are patent and free of thrombus. The veins are normally compressible.  They demonstrate normal phasic flow and augmentation response.   Within the right popliteal fossa there is a small hypoechoic cystic lesion or fluid collection measuring 1.4 x 0.5 x 0.6 cm.   CALF VEINS: The paired peroneal and posterior tibial calf veins are patent.         No evidence of deep venous thrombosis of the  bilateral lower extremity.   Nonspecific small hypoechoic cystic lesion or fluid collection of the right popliteal fossa.       MACRO: None.   Signed by: Vamsi Krishna 2/18/2025 4:01 PM Dictation workstation:   [id number]    XR tibia fibula left 2 views    Result Date: 2/18/2025  Interpreted By:  Ruddy Gage, STUDY: XR TIBIA FIBULA LEFT 2 VIEWS; 2/18/2025 9:32 am   INDICATION: Signs/Symptoms:swelling, pain.   COMPARISON: None.   ACCESSION NUMBER(S): [id number]   ORDERING CLINICIAN:  BIPIN DE LA GARZA   TECHNIQUE: Left tib fib two views   FINDINGS: No fractures or destructive lesions are identified. There is a small plantar calcaneal spur incidentally noted.       No acute pathologic findings are identified.   MACRO: none   Signed by: Ruddy Gage 2/18/2025 10:08 AM Dictation workstation:   [id number]    Assessment/Plan   Left leg cellulitis, resolving  Bilateral knee and ankle swelling/pain-gout, inflammation verses infection         IV Unasyn  IV vancomycin   Follow up blood cultures  Monitor temperature and WBC  Supportive care  Orthopedic [id number] left knee  Podiatry follow-up  Further recommendations based on workup    Total time spent caring for the patient today was 20 minutes. This includes time spent before the visit reviewing the chart, time spent during the visit, and time spent after the visit on documentation.     TOMER Ruiz-CNP

## 2025-02-21 NOTE — PROGRESS NOTES
Physical Therapy    Physical Therapy Treatment    Patient Name: Jake Banerjee  MRN: [id number]  Department: [address]  Room: [address] [place] [address]  Today's Date: 2/21/2025  Time Calculation  Start Time: 1320  Stop Time: 1400  Time Calculation (min): 40 min         Assessment/Plan   PT Assessment  PT Assessment Results: Decreased strength, Decreased range of motion, Decreased endurance, Impaired balance, Decreased mobility, Decreased cognition, Impaired judgement, Decreased safety awareness, Decreased skin integrity, Pain  Rehab Prognosis: Fair  Barriers to Discharge Home: Caregiver assistance, Cognition needs, Physical needs  Caregiver Assistance: Caregiver assistance needed per identified barriers - however, level of patient's required assistance exceeds assistance available at home  Cognition Needs: Other (Comment)  Physical Needs: High falls risk due to function or environment, 24hr ADL assistance needed, 24hr mobility assistance needed  Evaluation/Treatment Tolerance: Patient limited by pain, Patient limited by fatigue  Medical Staff Made Aware: Yes  Strengths: Premorbid level of function  Barriers to Participation: Comorbidities  End of Session Communication: Bedside nurse  Assessment Comment: Slighlty decreased pain today. Patient able to walk 15' x 2 with RW with Min A x 2 into/ out of bathroom with antagic gait STS with Min A x 2  End of Session Patient Position: Up in chair, Alarm on (Needs at reach)     PT Plan  Treatment/Interventions: Bed mobility, Transfer training, Gait training, Balance training, Neuromuscular re-education, Strengthening, Endurance training, Therapeutic exercise, Therapeutic activity  PT Plan: Ongoing PT  PT Frequency: 3 times per week  PT Discharge Recommendations: Moderate intensity level of continued care  Equipment Recommended upon Discharge: Wheeled walker  PT Recommended Transfer Status: Assist x2, Assistive device  PT - OK to Discharge: Yes      General Visit Information:   PT  Visit  PT  Received On: 02/21/25  General  Reason for Referral: impaired mobility d/t acute ieopathic gaout  Referred By: Dr. Jackson  Past Medical History Relevant to Rehab: gout, alcohol abuse, Left knee aspiration from October of last year demonstrated uric acid crystals  Prior to Session Communication: Bedside nurse  Patient Position Received: Bed, 3 rail up, Alarm on  Preferred Learning Style: verbal, visual  General Comment: Cleared by nurse to see. Patient agreeable to therapy    Subjective   Precautions:  Precautions  LE Weight Bearing Status: Weight Bearing as Tolerated  Medical Precautions: Fall precautions  Precautions Comment: WBAT BLE            Objective   Pain:  Pain Assessment  Pain Assessment: 0-10  0-10 (Numeric) Pain Score: 6  Pain Type: Acute pain  Pain Location: Knee  Pain Orientation: Right (R greater than L)  Pain Descriptors: Pressure, Tightness  Pain Frequency: Constant/continuous  Pain Onset: Ongoing  Cognition:  Cognition  Overall Cognitive Status: Impaired  Orientation Level: Oriented X4  Cognition Comments: Willing to try out of bed today  Safety/Judgement:  (Decreased safety awareness)  Insight: Mild  Impulsive: Mildly  Processing Speed: Delayed  Coordination:  Coordination Comment: slow, effortful movements Using B UEs to move B LEs  Postural Control:  Static Sitting Balance  Static Sitting-Level of Assistance: Close supervision  Static Sitting-Comment/Number of Minutes: EOB x 6 minutes with B feet on stool with close supervision  Dynamic Standing Balance  Dynamic Standing-Balance Support: Bilateral upper extremity supported  Dynamic Standing-Level of Assistance: Minimum assistance (x 2)  Dynamic Standing-Comments: 5 steps from bed to bedside chair with RW with Min A x 2  Extremity/Trunk Assessments:    Activity Tolerance:  Activity Tolerance  Endurance: Decreased tolerance for upright activites  Activity Tolerance Comments: Limited by pain  Treatments:  Therapeutic Exercise  Therapeutic Exercise  Performed: Yes  Therapeutic Exercise Activity 1: B LE ther ex while seated on EOB: toe raises, LAQs, hip flexion x 15 Patioent using hands to support thigh B LEs    Therapeutic Activity  Therapeutic Activity Performed: Yes  Therapeutic Activity 1: Patient reports having to use toilet Willing to walk into bathroom. Patient able to walk 15' x 2 with RW with Min A x 2 for safety due WBOS with small shuffling steps with increased weight through B UEs. Seated rest on toilet. STS to/ from toilet with Min A with cues for safe hand placement Not wanting to be educated to make transfers easier on him.    Bed Mobility  Bed Mobility: Yes  Bed Mobility 1  Bed Mobility 1: Supine to sitting  Level of Assistance 1: Close supervision  Bed Mobility Comments 1: Patient using B UEs to move BLEs to/ over the EOB. Did not want any assist .Cues for safe hand placement Able to scoot self to EOB    Ambulation/Gait Training  Ambulation/Gait Training Performed: Yes  Ambulation/Gait Training 1  Surface 1: Level tile  Device 1: Rolling walker  Assistance 1: Minimum assistance (x 2)  Quality of Gait 1: Wide base of support, Inconsistent stride length, Shuffling gait, Forward flexed posture  Comments/Distance (ft) 1: 15' x 2 with RW with Min A x 2 into/out of bathroom to use toilet  WBOS,shuffling gait with forward flexed trunk  Transfers  Transfer: Yes  Transfer 1  Transfer From 1: Bed to  Transfer to 1: Stand  Technique 1: Sit to stand  Transfer Device 1: Walker  Transfer Level of Assistance 1: Minimum assistance, +2  Trials/Comments 1: STS from elevated bed with Min A x 2 with cues for safe hand placement  Transfers 2  Transfer From 2: Stand to  Transfer to 2: Sit, Chair with arms  Technique 2: Stand to sit  Transfer Device 2: Walker  Transfer Level of Assistance 2: Minimum assistance, +2  Trials/Comments 2: STS to bedside chair with Min A x 2 with cues for safe hand placement Cues to slightly extend R LE with sitting due to R knee  pain  Transfers 3  Transfer From 3: Stand to  Transfer to 3: Sit, Toilet  Technique 3: Stand to sit, Sit to stand  Transfer Device 3: Walker  Transfer Level of Assistance 3: Minimum assistance, +2  Trials/Comments 3: STS to/ from toilet with Min A x 2 with cues for safe hand placement Cues to slightly extend R LE with sitting  Transfers 4  Transfer From 4: Stand to  Transfer to 4: Sit, Chair with arms  Technique 4: Stand to sit  Transfer Device 4: Walker  Transfer Level of Assistance 4: Minimum assistance, +2  Trials/Comments 4: STS to bedside chair with Min A x 2 with cues for safe hand placement Cues to slightly extend R LE with sitting    Outcome Measures:  [facility] Basic Mobility  Turning from your back to your side while in a flat bed without using bedrails: A little  Moving from lying on your back to sitting on the side of a flat bed without using bedrails: A little  Moving to and from bed to chair (including a wheelchair): A lot  Standing up from a chair using your arms (e.g. wheelchair or bedside chair): A lot  To walk in hospital room: Total  Climbing 3-5 steps with railing: Total  Basic Mobility - Total Score: 12    Education Documentation  Precautions, taught by Mery Bautista PTA at 2/21/2025  2:42 PM.  Learner: Patient  Readiness: Acceptance  Method: Explanation, Teach-back  Response: Verbalizes Understanding, Needs Reinforcement    Body Mechanics, taught by Mery Bautista PTA at 2/21/2025  2:42 PM.  Learner: Patient  Readiness: Acceptance  Method: Explanation, Teach-back  Response: Verbalizes Understanding, Needs Reinforcement    Home Exercise Program, taught by Mery Bautista PTA at 2/21/2025  2:42 PM.  Learner: Patient  Readiness: Acceptance  Method: Explanation, Teach-back  Response: Verbalizes Understanding, Needs Reinforcement    Mobility Training, taught by Mery Bautista PTA at 2/21/2025  2:42 PM.  Learner: Patient  Readiness: Acceptance  Method: Explanation, Teach-back  Response:  Verbalizes Understanding, Needs Reinforcement    Education Comments  No comments found.        OP EDUCATION:       Encounter Problems       Encounter Problems (Active)       Balance       Standing Balance (Progressing)       Start:  02/18/25    Expected End:  03/04/25       Pt will demonstrate good static standing balance to promote safe participation with out of bed activity, transfers, and mobility           Standing Balance (Progressing)       Start:  02/18/25    Expected End:  03/04/25       Pt will demonstrate good static standing balance to promote safe participation with out of bed activity, transfers, and mobility              Mobility       Ambulation (Progressing)       Start:  02/18/25    Expected End:  03/04/25       Pt will ambulate 50' modified independent assist with LRD to promote safe home mobility           Entry Stair Negotiation (Progressing)       Start:  02/18/25    Expected End:  03/04/25       Pt will ascend/descend 3 stairs with rail(s) on L and modified independent assist to promote safe entry and exit in home environment                PT Transfers       Supine to sit (Progressing)       Start:  02/18/25    Expected End:  03/04/25       Pt will transfer supine to sitting at edge of bed with modified independent assist to promote acute care out of bed activity           Sit to stand (Progressing)       Start:  02/18/25    Expected End:  03/04/25       Pt will transfer sit to standing position with modified independent assist and walker to promote safe out of bed activity              Safety       Safe Mobility Techniques (Progressing)       Start:  02/18/25    Expected End:  03/04/25       Pt will correctly identify and demonstrate safe mobility techniques to reduce their risks for falls during their acute care stay

## 2025-02-21 NOTE — PROGRESS NOTES
02/21/25 1239   Discharge Planning   Expected Discharge Disposition SNF  ([facility])   Does the patient need discharge transport arranged? Yes   RoundTrip coordination needed? Yes   Has discharge transport been arranged? No     Patient wants to stay here and Rehab.   Long talk with him about this.  Reluctantly, he picked  Between the 2 facilities that can accept him.  Patient cannot walk.  Sat on edge of bed today.  [facility] is the facility of choice - Mgiel Chua done so I could complete the 7000 form to start precert through medicaid.   MEDICAID PRECERT STARTED for [facility]

## 2025-02-21 NOTE — CARE PLAN
The clinical goals for the shift include pain management.    Over the shift, the patient did make progress toward the following goals. Recommendations to address these barriers include call light in reach, bed alarm activated, appropriately medicate pt.    Problem: Fall/Injury  Goal: Not fall by end of shift  Outcome: Progressing     Problem: Skin  Goal: Promote skin healing  Outcome: Progressing     Problem: Pain  Goal: Participates in PT with improved pain control throughout the shift  Outcome: Progressing     Problem: Pain  Goal: Walks with improved pain control throughout the shift  Outcome: Progressing

## 2025-02-21 NOTE — CARE PLAN
Problem: Skin  Goal: Decreased wound size/increased tissue granulation at next dressing change  Outcome: Progressing  Goal: Participates in plan/prevention/treatment measures  Outcome: Progressing  Goal: Prevent/manage excess moisture  Outcome: Progressing  Goal: Prevent/minimize sheer/friction injuries  Outcome: Progressing  Goal: Promote/optimize nutrition  Outcome: Progressing  Goal: Promote skin healing  Outcome: Progressing   The patient's goals for the shift include pain control.      The clinical goals for the shift include pain management.

## 2025-02-22 LAB
ALBUMIN SERPL BCP-MCNC: 2.7 G/DL (ref 3.4–5)
ALP SERPL-CCNC: 94 U/L (ref 33–136)
ALT SERPL W P-5'-P-CCNC: 30 U/L (ref 10–52)
ANION GAP SERPL CALCULATED.3IONS-SCNC: <7 MMOL/L (ref 10–20)
AST SERPL W P-5'-P-CCNC: 61 U/L (ref 9–39)
BASOPHILS # BLD AUTO: 0.05 X10*3/UL (ref 0–0.1)
BASOPHILS NFR BLD AUTO: 1.2 %
BILIRUB SERPL-MCNC: 0.9 MG/DL (ref 0–1.2)
BUN SERPL-MCNC: 12 MG/DL (ref 6–23)
CALCIUM SERPL-MCNC: 8.1 MG/DL (ref 8.6–10.3)
CHLORIDE SERPL-SCNC: 105 MMOL/L (ref 98–107)
CO2 SERPL-SCNC: 30 MMOL/L (ref 21–32)
CREAT SERPL-MCNC: 0.7 MG/DL (ref 0.5–1.3)
CRYSTALS FLD MICRO: ABNORMAL
EGFRCR SERPLBLD CKD-EPI 2021: >90 ML/MIN/1.73M*2
EOSINOPHIL # BLD AUTO: 0.36 X10*3/UL (ref 0–0.7)
EOSINOPHIL NFR BLD AUTO: 8.3 %
ERYTHROCYTE [DISTWIDTH] IN BLOOD BY AUTOMATED COUNT: 14.4 % (ref 11.5–14.5)
GLUCOSE SERPL-MCNC: 90 MG/DL (ref 74–99)
HCT VFR BLD AUTO: 26.6 % (ref 41–52)
HGB BLD-MCNC: 8.8 G/DL (ref 13.5–17.5)
IMM GRANULOCYTES # BLD AUTO: 0.01 X10*3/UL (ref 0–0.7)
IMM GRANULOCYTES NFR BLD AUTO: 0.2 % (ref 0–0.9)
LYMPHOCYTES # BLD AUTO: 0.96 X10*3/UL (ref 1.2–4.8)
LYMPHOCYTES NFR BLD AUTO: 22.2 %
MCH RBC QN AUTO: 35.8 PG (ref 26–34)
MCHC RBC AUTO-ENTMCNC: 33.1 G/DL (ref 32–36)
MCV RBC AUTO: 108 FL (ref 80–100)
MONOCYTES # BLD AUTO: 0.76 X10*3/UL (ref 0.1–1)
MONOCYTES NFR BLD AUTO: 17.6 %
NEUTROPHILS # BLD AUTO: 2.19 X10*3/UL (ref 1.2–7.7)
NEUTROPHILS NFR BLD AUTO: 50.5 %
NRBC BLD-RTO: 0 /100 WBCS (ref 0–0)
PLATELET # BLD AUTO: 131 X10*3/UL (ref 150–450)
POTASSIUM SERPL-SCNC: 3.5 MMOL/L (ref 3.5–5.3)
PROT SERPL-MCNC: 6 G/DL (ref 6.4–8.2)
RBC # BLD AUTO: 2.46 X10*6/UL (ref 4.5–5.9)
SODIUM SERPL-SCNC: 136 MMOL/L (ref 136–145)
VANCOMYCIN SERPL-MCNC: 13.7 UG/ML (ref 5–20)
WBC # BLD AUTO: 4.3 X10*3/UL (ref 4.4–11.3)

## 2025-02-22 PROCEDURE — 2500000004 HC RX 250 GENERAL PHARMACY W/ HCPCS (ALT 636 FOR OP/ED)

## 2025-02-22 PROCEDURE — 90471 IMMUNIZATION ADMIN: CPT | Performed by: INTERNAL MEDICINE

## 2025-02-22 PROCEDURE — 99232 SBSQ HOSP IP/OBS MODERATE 35: CPT | Performed by: INTERNAL MEDICINE

## 2025-02-22 PROCEDURE — 80202 ASSAY OF VANCOMYCIN: CPT | Performed by: NURSE PRACTITIONER

## 2025-02-22 PROCEDURE — 80053 COMPREHEN METABOLIC PANEL: CPT | Performed by: INTERNAL MEDICINE

## 2025-02-22 PROCEDURE — 85025 COMPLETE CBC W/AUTO DIFF WBC: CPT | Performed by: INTERNAL MEDICINE

## 2025-02-22 PROCEDURE — 1200000002 HC GENERAL ROOM WITH TELEMETRY DAILY

## 2025-02-22 PROCEDURE — 99446 NTRPROF PH1/NTRNET/EHR 5-10: CPT | Performed by: ORTHOPAEDIC SURGERY

## 2025-02-22 PROCEDURE — 90472 IMMUNIZATION ADMIN EACH ADD: CPT | Performed by: INTERNAL MEDICINE

## 2025-02-22 PROCEDURE — 90677 PCV20 VACCINE IM: CPT | Performed by: INTERNAL MEDICINE

## 2025-02-22 PROCEDURE — 90662 IIV NO PRSV INCREASED AG IM: CPT | Performed by: INTERNAL MEDICINE

## 2025-02-22 PROCEDURE — 2500000001 HC RX 250 WO HCPCS SELF ADMINISTERED DRUGS (ALT 637 FOR MEDICARE OP): Performed by: INTERNAL MEDICINE

## 2025-02-22 PROCEDURE — 2500000004 HC RX 250 GENERAL PHARMACY W/ HCPCS (ALT 636 FOR OP/ED): Performed by: INTERNAL MEDICINE

## 2025-02-22 PROCEDURE — 36415 COLL VENOUS BLD VENIPUNCTURE: CPT | Performed by: INTERNAL MEDICINE

## 2025-02-22 RX ORDER — VANCOMYCIN 1.75 G/350ML
1250 INJECTION, SOLUTION INTRAVENOUS EVERY 12 HOURS SCHEDULED
Status: DISCONTINUED | OUTPATIENT
Start: 2025-02-22 | End: 2025-02-23

## 2025-02-22 RX ORDER — PREDNISONE 20 MG/1
40 TABLET ORAL DAILY
Status: DISCONTINUED | OUTPATIENT
Start: 2025-02-22 | End: 2025-02-24 | Stop reason: HOSPADM

## 2025-02-22 RX ADMIN — COLCHICINE 0.6 MG: 0.6 TABLET, FILM COATED ORAL at 10:22

## 2025-02-22 RX ADMIN — PANTOPRAZOLE SODIUM 40 MG: 40 TABLET, DELAYED RELEASE ORAL at 06:18

## 2025-02-22 RX ADMIN — INFLUENZA A VIRUS A/VICTORIA/4897/2022 IVR-238 (H1N1) ANTIGEN (FORMALDEHYDE INACTIVATED), INFLUENZA A VIRUS A/CALIFORNIA/122/2022 SAN-022 (H3N2) ANTIGEN (FORMALDEHYDE INACTIVATED), AND INFLUENZA B VIRUS B/MICHIGAN/01/2021 ANTIGEN (FORMALDEHYDE INACTIVATED) 0.5 ML: 60; 60; 60 INJECTION, SUSPENSION INTRAMUSCULAR at 15:16

## 2025-02-22 RX ADMIN — AMPICILLIN SODIUM AND SULBACTAM SODIUM 3 G: 2; 1 INJECTION, POWDER, FOR SOLUTION INTRAMUSCULAR; INTRAVENOUS at 10:25

## 2025-02-22 RX ADMIN — Medication 100 MG: at 10:25

## 2025-02-22 RX ADMIN — ENOXAPARIN SODIUM 40 MG: 40 INJECTION SUBCUTANEOUS at 15:16

## 2025-02-22 RX ADMIN — PREDNISONE 40 MG: 20 TABLET ORAL at 10:28

## 2025-02-22 RX ADMIN — COLCHICINE 0.6 MG: 0.6 TABLET, FILM COATED ORAL at 20:51

## 2025-02-22 RX ADMIN — OXYCODONE HYDROCHLORIDE 5 MG: 5 TABLET ORAL at 20:51

## 2025-02-22 RX ADMIN — AMPICILLIN SODIUM AND SULBACTAM SODIUM 3 G: 2; 1 INJECTION, POWDER, FOR SOLUTION INTRAMUSCULAR; INTRAVENOUS at 03:56

## 2025-02-22 RX ADMIN — VANCOMYCIN 1250 MG: 1.75 INJECTION, SOLUTION INTRAVENOUS at 20:50

## 2025-02-22 RX ADMIN — Medication 1 TABLET: at 10:25

## 2025-02-22 RX ADMIN — PNEUMOCOCCAL 20-VALENT CONJUGATE VACCINE 0.5 ML
2.2; 2.2; 2.2; 2.2; 2.2; 2.2; 2.2; 2.2; 2.2; 2.2; 2.2; 2.2; 2.2; 2.2; 2.2; 2.2; 4.4; 2.2; 2.2; 2.2 INJECTION, SUSPENSION INTRAMUSCULAR at 15:37

## 2025-02-22 RX ADMIN — VANCOMYCIN 1250 MG: 1.75 INJECTION, SOLUTION INTRAVENOUS at 10:29

## 2025-02-22 RX ADMIN — FOLIC ACID 1 MG: 1 TABLET ORAL at 10:24

## 2025-02-22 RX ADMIN — POLYETHYLENE GLYCOL 3350 17 G: 17 POWDER, FOR SOLUTION ORAL at 10:25

## 2025-02-22 RX ADMIN — AMPICILLIN SODIUM AND SULBACTAM SODIUM 3 G: 2; 1 INJECTION, POWDER, FOR SOLUTION INTRAMUSCULAR; INTRAVENOUS at 22:56

## 2025-02-22 RX ADMIN — AMPICILLIN SODIUM AND SULBACTAM SODIUM 3 G: 2; 1 INJECTION, POWDER, FOR SOLUTION INTRAMUSCULAR; INTRAVENOUS at 15:39

## 2025-02-22 ASSESSMENT — PAIN DESCRIPTION - LOCATION: LOCATION: KNEE

## 2025-02-22 ASSESSMENT — COGNITIVE AND FUNCTIONAL STATUS - GENERAL
MOBILITY SCORE: 18
DRESSING REGULAR UPPER BODY CLOTHING: A LITTLE
TURNING FROM BACK TO SIDE WHILE IN FLAT BAD: A LITTLE
STANDING UP FROM CHAIR USING ARMS: A LITTLE
MOVING FROM LYING ON BACK TO SITTING ON SIDE OF FLAT BED WITH BEDRAILS: A LITTLE
TOILETING: A LITTLE
CLIMB 3 TO 5 STEPS WITH RAILING: A LITTLE
HELP NEEDED FOR BATHING: A LITTLE
PERSONAL GROOMING: A LITTLE
DAILY ACTIVITIY SCORE: 19
WALKING IN HOSPITAL ROOM: A LITTLE
DRESSING REGULAR LOWER BODY CLOTHING: A LITTLE
MOVING TO AND FROM BED TO CHAIR: A LITTLE

## 2025-02-22 ASSESSMENT — PAIN - FUNCTIONAL ASSESSMENT: PAIN_FUNCTIONAL_ASSESSMENT: 0-10

## 2025-02-22 ASSESSMENT — PAIN SCALES - GENERAL
PAINLEVEL_OUTOF10: 0 - NO PAIN
PAINLEVEL_OUTOF10: 0 - NO PAIN
PAINLEVEL_OUTOF10: 9

## 2025-02-22 ASSESSMENT — PAIN DESCRIPTION - ORIENTATION: ORIENTATION: LEFT;RIGHT

## 2025-02-22 NOTE — PROGRESS NOTES
Jake Banerjee is a 69 y.o. male on day 2 of admission presenting with Acute idiopathic gout of multiple sites.      Subjective   Left knee which was drained feels better still complaining right knee pain wants it drained.  No fevers no chills.  Tmax 37 8       Objective     Last Recorded Vitals  /65 (BP Location: Right arm, Patient Position: Lying)   Pulse 77   Temp 37 °C (98.6 °F) (Temporal)   Resp 18   Wt 70.3 kg (155 lb)   SpO2 95%   Intake/Output last 3 Shifts:    Intake/Output Summary (Last 24 hours) at 2/22/2025 0824  Last data filed at 2/22/2025 0358  Gross per 24 hour   Intake 800 ml   Output 500 ml   Net 300 ml       Physical Exam  Alert oriented x 3 cooperative  Chest clear  Heart regular  Abdomen soft nontender  Extremities no pitting edema slightly improved edema left knee  Musculoskeletal same joint swelling  Neurologic exam he seems to be moving better the left lower extremity Simes still undecided if this is related to pain or internal weakness  Relevant Results  Reviewed fluid analysis which shows 24,000 WBCs, essentially consistent with inflammatory fluid.  Positive crystals identified  Assessment/Plan     Assessment & Plan  Acute idiopathic gout of multiple sites  Continue treatment; no plans for procedures on behalf of orthopedics or podiatry  Ambulatory dysfunction  Will need rehab  Cellulitis and abscess of left lower extremity  Improving  Alcohol abuse  No withdrawal  Hyperbilirubinemia  Mostly direct check ultrasound; ultrasound negative  Other specified anemias  He has high MCV; rule out hemolysis; no hemolysis    February 22,     Will start prednisone as I think it is point that this is all inflammatory gout induced and I see little evidence of actual joint infection whatsoever; cultures pending from the synovial fluid             Raghav Mcdonald MD

## 2025-02-22 NOTE — PROGRESS NOTES
I reviewed aspirate results.  Grams and no organisms, culture pending.  Positive urate crystals.  Inflammatory white blood cell count.    Gout of the knee: Continue medical management.  Call with questions.

## 2025-02-22 NOTE — CARE PLAN
The patient's goals for the shift include      The clinical goals for the shift include pain management, safety,    Problem: Skin  Goal: Decreased wound size/increased tissue granulation at next dressing change  Outcome: Progressing  Goal: Participates in plan/prevention/treatment measures  Outcome: Progressing  Goal: Prevent/manage excess moisture  Outcome: Progressing  Goal: Prevent/minimize sheer/friction injuries  Outcome: Progressing  Goal: Promote/optimize nutrition  Outcome: Progressing  Goal: Promote skin healing  Outcome: Progressing     Problem: Fall/Injury  Goal: Not fall by end of shift  Outcome: Progressing  Goal: Be free from injury by end of the shift  Outcome: Progressing  Goal: Verbalize understanding of personal risk factors for fall in the hospital  Outcome: Progressing  Goal: Verbalize understanding of risk factor reduction measures to prevent injury from fall in the home  Outcome: Progressing  Goal: Use assistive devices by end of the shift  Outcome: Progressing  Goal: Pace activities to prevent fatigue by end of the shift  Outcome: Progressing     Problem: Pain  Goal: Takes deep breaths with improved pain control throughout the shift  Outcome: Progressing  Goal: Turns in bed with improved pain control throughout the shift  Outcome: Progressing  Goal: Walks with improved pain control throughout the shift  Outcome: Progressing  Goal: Performs ADL's with improved pain control throughout shift  Outcome: Progressing  Goal: Participates in PT with improved pain control throughout the shift  Outcome: Progressing  Goal: Free from opioid side effects throughout the shift  Outcome: Progressing  Goal: Free from acute confusion related to pain meds throughout the shift  Outcome: Progressing

## 2025-02-22 NOTE — CONSULTS
Ortho spine interprofessional consultation    I was requested to consult on this patient because of the identification of lumbar spinal stenosis.    He is a 69-year-old man who is admitted through the emergency department from home 4 days ago, on February 18.  He was unable to walk due to a flare of gout.  He has had multiple joints in the lower extremities painful and swollen and also was found to have cellulitis in his left lower leg upon admission.  He has been on antibiotics subsequently.    He obtained MRI scans of the cervical and lumbosacral spines.  There are no earlier images of his lumbar spine in the PACS system.  There is no evidence in either scan of infection or malignancy or acute fracture.    I reviewed the images personally.  He has moderately severe chronic lumbar spinal stenosis at L3-4 related to annular bulging and facet hypertrophy.  There is synovial fluid to present in the facets at L3-4 and L4-5.  Demonstrates a disc osteophyte complex impinging into the canal at C5-6 but without spinal cord compression and with no cord signal change.    His lumbar spinal stenosis can be worked up electively on an outpatient basis.  It has no relationship to his acute gout flareups nor the cellulitis.  There are no acute findings in the scans of his cervical or lumbosacral spine.    I spent 8 minutes reviewing the medical records, reviewing the imaging and preparing this report.

## 2025-02-22 NOTE — PROGRESS NOTES
Jake Banerjee is a 69 y.o. male on day 2 of admission presenting with Acute idiopathic gout of multiple sites.    Subjective   Interval History:   Afebrile, no chills  Interval left knee aspiration, workup remarkable for gout  Interval decrease in left knee swelling  Reports mild to moderate right knee.  No chest pain or shortness of breath.  No nausea vomiting or diarrhea  Review of Systems   Cardiovascular:  Positive for leg swelling.   Musculoskeletal:  Positive for joint swelling.   All other systems reviewed and are negative.      Objective   Range of Vitals (last 24 hours)  Heart Rate:  [76-85]   Temp:  [36.3 °C (97.3 °F)-37.8 °C (100 °F)]   Resp:  [16-21]   BP: (106-123)/(62-77)   SpO2:  [94 %-100 %]   Daily Weight  02/18/25 : 70.3 kg (155 lb)    Body mass index is 22.89 kg/m².    Physical Exam  Constitutional:       Appearance: Normal appearance.   HENT:      Head: Normocephalic and atraumatic.      Nose: Nose normal.      Mouth/Throat:      Mouth: Mucous membranes are moist.      Pharynx: Oropharynx is clear.   Eyes:      General: No scleral icterus.  Cardiovascular:      Rate and Rhythm: Normal rate and regular rhythm.   Pulmonary:      Effort: Pulmonary effort is normal.      Breath sounds: Normal breath sounds.   Abdominal:      General: Bowel sounds are normal.      Palpations: Abdomen is soft.   Musculoskeletal:         General: Normal range of motion.      Cervical back: Normal range of motion and neck supple.      Comments: Left knee swelling  Right hallux swelling, mild erythema    Skin:     Comments: Resolved Left leg erythema, calor  Left hallux black discoloration under nail bed   Neurological:      General: No focal deficit present.      Mental Status: He is alert.   Psychiatric:         Mood and Affect: Mood normal.         Behavior: Behavior normal.     Antibiotics  ampicillin-sulbactam - 3 gram/100 mL  vancomycin - 1.25 gram/250 mL    Relevant Results  Labs  Results from last 72 hours   Lab  Units 02/22/25  0427 02/21/25  0550 02/20/25  0654   WBC AUTO x10*3/uL 4.3* 4.2* 5.9   HEMOGLOBIN g/dL 8.8* 9.1* 9.0*   HEMATOCRIT % 26.6* 28.3* 27.4*   PLATELETS AUTO x10*3/uL 131* 122* 118*   NEUTROS PCT AUTO % 50.5 54.5 61.6   LYMPHS PCT AUTO % 22.2 20.5 17.3   MONOS PCT AUTO % 17.6 16.7 15.2   EOS PCT AUTO % 8.3 7.1 4.9     Results from last 72 hours   Lab Units 02/22/25  0427 02/21/25  0550 02/20/25  0654   SODIUM mmol/L 136 136 134*   POTASSIUM mmol/L 3.5 3.4* 3.5   CHLORIDE mmol/L 105 102 103   CO2 mmol/L 30 28 24   BUN mg/dL 12 13 18   CREATININE mg/dL 0.70 0.68 0.68   GLUCOSE mg/dL 90 102* 92   CALCIUM mg/dL 8.1* 8.0* 8.2*   ANION GAP mmol/L <7* 9* 11   EGFR mL/min/1.73m*2 >90 >90 >90     Results from last 72 hours   Lab Units 02/22/25 0427 02/21/25  0550 02/20/25  0654   ALK PHOS U/L 94 91 83   BILIRUBIN TOTAL mg/dL 0.9 0.9 1.1   PROTEIN TOTAL g/dL 6.0* 6.1* 6.1*   ALT U/L 30 30 25   AST U/L 61* 65* 55*   ALBUMIN g/dL 2.7* 2.9* 2.9*     Estimated Creatinine Clearance: 101.8 mL/min (by C-G formula based on SCr of 0.7 mg/dL).  C-Reactive Protein   Date Value Ref Range Status   02/18/2025 10.91 (H) <1.00 mg/dL Final     CRP   Date Value Ref Range Status   09/06/2023 6.4 (H) 0 - 2.0 MG/DL Final     Comment:     Performed at [address] [address]     Microbiology  Reviewed-blood cultures pending, synovial fluid culture pending  Imaging  MR knee left wo IV contrast    Result Date: 2/21/2025  Interpreted By:  Josiah Zepeda and Lawrence Austen STUDY: MRI of the  left knee without IV contrast;  2/20/2025 9:46 pm   INDICATION: Signs/Symptoms:Swelling.     COMPARISON: Radiographs 02/18/2025.   ACCESSION NUMBER(S): [id number]   ORDERING CLINICIAN: DEVANG ROMERO   TECHNIQUE: MR imaging of the  left knee was obtained  without IV contrast.   FINDINGS: LIGAMENTS AND TENDONS: Mucoid degeneration of the ACL. The posterior cruciate ligaments are intact. The medial collateral ligament is  intact. The lateral collateral ligament, the biceps femoris tendon, the popliteus tendon and the iliotibial band are intact. The quadriceps tendon and the patellar tendon are intact.   MENISCI: The medial meniscus is intact and without evidence of tear. The lateral meniscus is intact and without evidence of tear.   JOINTS: The articular cartilage of the medial femoral condyle and medial tibial plateau is intact and without evidence of full thickness defect. The articular cartilage of the lateral femoral condyle and lateral tibial plateau is intact and without evidence of full thickness defect. Small fissure and moderate chondral loss at the medial patellar facet cartilage. Large joint effusion.   OSSEOUS STRUCTURES: No focal marrow replacing lesions are identified. There is no fracture.   SOFT TISSUES: There is no muscle atrophy or tear. The common peroneal nerve is intact.       1. No acute osseous or soft tissue injury. 2. Large joint effusion. 3. Moderate chondral loss with chondral fissuring at the medial patellar facet.     I personally reviewed the images/study and I agree with the findings as stated. This study was interpreted at [place], [place].   MACRO: None   Signed by: Josiah Zepeda 2/21/2025 9:56 AM Dictation workstation:   [id number]    MR lumbar spine wo IV contrast    Result Date: 2/21/2025  Interpreted By:  Breanna Dhillon, STUDY: MR LUMBAR SPINE WO IV CONTRAST; 2/20/2025 9:00 pm   INDICATION: Signs/Symptoms:Lower extremity weakness.   COMPARISON: None.   ACCESSION NUMBER(S): [id number]   ORDERING CLINICIAN: DEVANG ROMERO   TECHNIQUE: Sagittal and axial T1 and T2 weighted images of the lumbar spine were acquired. Sagittal STIR imaging was also performed   FINDINGS: Motion limited exam. There is mild retrolisthesis of L5 over S1 and minimal retrolisthesis of T12 over L1. The vertebral bodies demonstrate expected height. The marrow signal is  heterogenous on T1 weighted images, a nonspecific finding. This may reflect sequela of osteoporosis/osteopenia, marrow hyperplasia secondary to chronic anemia or other marrow abnormalities.   There is desiccation of L5-S1 intervertebral disc with loss of disc height. There is desiccation of remainder of the intervertebral discs as well.   The lower thoracic cord is unremarkable. The conus terminates at L1-L2.   At L5-S1 there is circumferential disc bulge with bilateral facet and ligamentum flavum hypertrophy. There is narrowing of bilateral lateral recesses without overall central canal stenosis. There is moderate bilateral neural foraminal narrowing, left greater than right. Endplate osteophytic spurring is also noted.   At L4-L5 there is circumferential disc bulge with bilateral facet and ligamentum flavum hypertrophy. There is mild bilateral facet joint effusion. There is mild-to-moderate central canal stenosis with mild-to-moderate bilateral neural foraminal narrowing.   At L3-L4 there is circumferential disc bulge with bilateral facet and ligamentum flavum hypertrophy. There are bilateral facet joint effusions. There is severe central canal stenosis with mild bilateral neural foraminal narrowing.   At L2-L3, there is posterior disc bulge with bilateral facet hypertrophy. There is no central canal stenosis or neural foraminal narrowing.   At L1-L2, there is posterior disc bulge with bilateral facet hypertrophy. No central canal stenosis or neural foraminal narrowing.   At T12-L1, no central canal stenosis or neural foraminal narrowing. Note is made of a circumferential disc bulge with a small annular fissure along the posterior margin of the disc. The anterior and posterior paraspinous soft tissues are within normal limits.       Lumbar spondylosis with varying severity of central canal and neural foraminal narrowing affecting multiple levels, most significantly causing severe central canal stenosis at L3-L4.  Additionally L4-L5 and L5-S1 levels are also affected.   Signed by: Breanna Dhillon 2/21/2025 9:13 AM Dictation workstation:   [id number]    MR brain wo IV contrast    Result Date: 2/20/2025  Interpreted By:  Lucas Morales, STUDY: MR BRAIN WO IV CONTRAST;  2/19/2025 8:28 pm   INDICATION: Signs/Symptoms:weakness, falls.   COMPARISON: None.   ACCESSION NUMBER(S): [id number]   ORDERING CLINICIAN: STELLA FONTANEZ   TECHNIQUE: The brain was studied in the sagittal axial and coronal planes utilizing diffusion, gradient echo T2 weighted FLAIR, T1 and T2 weighted images   FINDINGS: There is slight prominence of the cortical sulci and sylvian fissures. There is mild ventricular dilatation.  There are scattered and confluence foci of abnormal signal within the periventricular and subcortical white matter bilaterally.  These are compatible with minimal small vessel ischemic changes.  These nonspecific findings could also be produced by a demyelinating or post inflammatory process.  The visualized skull base paranasal sinuses and orbital structures are unremarkable. Diffusion weighted images and associated ADC maps of the brain were unremarkable.  There is no evidence of diffusion restriction to suggest the presence of acute infarction. Gradient echo T2 weighted images fail to demonstrate hemosiderin deposition or other evidence of hemorrhage.   IMPRESSION * There is no evidence of mass, cerebral infarction or hemorrhage.     MACRO: none   Signed by: Lucas Morales 2/20/2025 9:25 AM Dictation workstation:   [id number]    MR cervical spine wo IV contrast    Result Date: 2/20/2025  Interpreted By:  Lucas Morales, STUDY: MR CERVICAL SPINE WO IV CONTRAST;  2/19/2025 8:48 pm   INDICATION: Signs/Symptoms:Hyperreflexia, leg weakness.   COMPARISON: None.   ACCESSION NUMBER(S): [id number]   ORDERING CLINICIAN: STELLA FONTANEZ   TECHNIQUE: The cervical spine was studied in the sagittal and axial planes utilizing T1 and T2  weighted images.   FINDINGS: Examination is limited due to motion artifact. The craniovertebral junction is normal. The cord is normal in size and signal. The marrow signal is normal. Serial axial images reveal the following: C2/C3 Mild uncovertebral joint and facet hypertrophy without canal or foraminal narrowing C3/C4 Right worse than left foraminal narrowing due to facet hypertrophy and bilateral uncovertebral joint hypertrophy. No measurable central canal stenosis C4/C5 Circumferential bulging intervertebral disc. Bilateral facet hypertrophy. Flattening of the thecal sac which likely measures a proximally 7 mm in AP dimension in the midline. No measurable canal stenosis. C5/C6 Marginal osteophyte formation and circumferential bulging intervertebral disc without canal or foraminal narrowing C6/C7 Bilateral uncovertebral joint hypertrophy. Bilateral facet hypertrophy with retained synovial fluid in the right-sided facet joint. No measurable canal stenosis or foraminal narrowing C7/T1 There is normal alignment and vertebral body height. The disc space is normal. There is no evidence of canal or foraminal narrowing. There is no evidence of bulging or herniated disc.       * Motion limited examination demonstrates cervical spondylosis as described   MACRO: none   Signed by: Lucas Morales 2/20/2025 9:21 AM Dictation workstation:   [id number]    US abdomen limited liver    Result Date: 2/19/2025  Interpreted By:  Vamsi Krishna, STUDY: US ABDOMEN LIMITED LIVER;  2/19/2025 3:40 pm   INDICATION: Signs/Symptoms:High bilirubin.     COMPARISON: None.   ACCESSION NUMBER(S): [id number]   ORDERING CLINICIAN: DEVANG ROMERO   TECHNIQUE: Real-time sonographic evaluation of the right upper quadrant was performed.   FINDINGS: LIVER: The liver is diffusely echogenic in appearance with impaired acoustic penetration, consistent with diffuse fatty infiltration.  Liver is enlarged measuring 19.6 cm in sagittal dimension.    GALLBLADDER: The gallbladder is nondistended and demonstrates no evidence of gallstones, wall thickening or surrounding fluid. Sonographic Decker's sign is negative.   BILIARY TREE: Common bile duct is not dilated measuring 2 mm in diameter.   PANCREAS: The pancreas is poorly visualized due to overlying bowel gas.   RIGHT KIDNEY: Right kidney measures  10.3cm in length.  No right hydronephrosis is seen.       Hepatomegaly and probable fatty infiltration of the liver.   Unremarkable gallbladder. No biliary dilation.   Sub visualization of the pancreas due to bowel gas.   MACRO: None.   Signed by: Vamsi Krishna 2/19/2025 3:50 PM Dictation workstation:   [id number]    XR knee 4+ views bilateral    Result Date: 2/18/2025  Interpreted By:  Flavio Arceo, STUDY: XR KNEE 4+ VIEWS BILATERAL; 2/18/2025 4:14 pm   INDICATION: Signs/Symptoms:Pain and swelling both knees   COMPARISON: None.   ACCESSION NUMBER(S): [id number]   ORDERING CLINICIAN: DEVANG ROMERO   TECHNIQUE: Number of films: 8 view radiographs of the knees bilaterally.   FINDINGS: No fractures or destructive lesions are identified. The joint spaces and articular surfaces are maintained. The alignment is anatomic. The soft tissues are unremarkable. There is small right and moderate right suprapatellar joint effusion.       Small right and moderate left suprapatellar joint effusions without underlying fracture or subluxation. No radiographic evidence of acute osteomyelitis.   Signed by: Flavio Arceo 2/18/2025 4:57 PM Dictation workstation:   [id number]    XR ankle bilateral complete minimum 3 views    Result Date: 2/18/2025  Interpreted By:  Flavio Arceo, STUDY: XR ANKLE BILATERAL COMPLETE MINIMUM 3 VIEWS 2/18/2025 4:14 pm   INDICATION: Signs/Symptoms:Rule out arthritis vs infection Injury/Pain   COMPARISON: Left knee from 2017   ACCESSION NUMBER(S): [id number]   ORDERING CLINICIAN: DEVANG ROMERO   TECHNIQUE: Number of films: 6 view  radiographs of the ankles bilaterally.   FINDINGS: There is no fracture, blastic or lytic bone lesion. No abnormal periosteal reaction is identified. The ankle mortise and subtalar joints are intact. The alignment is anatomic. Small calcaneal spurs are seen bilaterally. Mild bilateral soft tissue swelling is noted, without abnormal gas collections or radiopaque foreign bodies.       Mild bilateral soft tissue swelling. No radiographic evidence of acute osteomyelitis.   Signed by: Flavio Arceo 2/18/2025 4:55 PM Dictation workstation:   [id number]    Vascular US Lower Extremity Venous Duplex Bilateral    Result Date: 2/18/2025  Interpreted By:  Vamsi Krishna, STUDY: [facility] US LOWER EXTREMITY VENOUS DUPLEX BILATERAL  2/18/2025 3:55 pm   INDICATION: Swelling and pain for 5 days.   ,R60.9 Edema, unspecified   COMPARISON: 09/06/2023.   ACCESSION NUMBER(S): [id number]   ORDERING CLINICIAN: DEVANG ROMERO   TECHNIQUE: Routine ultrasound of the  bilateral lower extremity was performed with duplex Doppler (color and spectral) evaluation.  Static images were obtained for remote interpretation.   FINDINGS: THIGH VEINS: The  bilateral common femoral, femoral, popliteal, proximal medial saphenous, and deep femoral veins are patent and free of thrombus. The veins are normally compressible.  They demonstrate normal phasic flow and augmentation response.   Within the right popliteal fossa there is a small hypoechoic cystic lesion or fluid collection measuring 1.4 x 0.5 x 0.6 cm.   CALF VEINS: The paired peroneal and posterior tibial calf veins are patent.         No evidence of deep venous thrombosis of the  bilateral lower extremity.   Nonspecific small hypoechoic cystic lesion or fluid collection of the right popliteal fossa.       MACRO: None.   Signed by: Vamsi Krishna 2/18/2025 4:01 PM Dictation workstation:   [id number]    XR tibia fibula left 2 views    Result Date: 2/18/2025  Interpreted By:  Too  Ruddy, STUDY: XR TIBIA FIBULA LEFT 2 VIEWS; 2/18/2025 9:32 am   INDICATION: Signs/Symptoms:swelling, pain.   COMPARISON: None.   ACCESSION NUMBER(S): [id number]   ORDERING CLINICIAN: BIPIN DE LA GARZA   TECHNIQUE: Left tib fib two views   FINDINGS: No fractures or destructive lesions are identified. There is a small plantar calcaneal spur incidentally noted.       No acute pathologic findings are identified.   MACRO: none   Signed by: Ruddy Gage 2/18/2025 10:08 AM Dictation workstation:   [id number]     Assessment/Plan   Left leg cellulitis, resolving  Bilateral knee and ankle swelling/pain-left knee workup remarkable for gout        IV Unasyn  Discontinue IV vancomycin   Follow up blood cultures  Management per primary attending  Monitor temperature and WBC  Supportive care  De-escalate to Augmentin in 1 to 2 days    Ramo Baca MD

## 2025-02-22 NOTE — CARE PLAN
The patient's goals for the shift include      The clinical goals for the shift include pain management, safety,    Problem: Skin  Goal: Decreased wound size/increased tissue granulation at next dressing change  Outcome: Progressing  Flowsheets (Taken 2/21/2025 2217)  Decreased wound size/increased tissue granulation at next dressing change:   Promote sleep for wound healing   Protective dressings over bony prominences  Goal: Participates in plan/prevention/treatment measures  Outcome: Progressing  Flowsheets (Taken 2/21/2025 2217)  Participates in plan/prevention/treatment measures: Increase activity/out of bed for meals  Goal: Prevent/manage excess moisture  Outcome: Progressing  Flowsheets (Taken 2/21/2025 2217)  Prevent/manage excess moisture: Monitor for/manage infection if present  Goal: Prevent/minimize sheer/friction injuries  Outcome: Progressing  Flowsheets (Taken 2/21/2025 2217)  Prevent/minimize sheer/friction injuries:   Use pull sheet   Increase activity/out of bed for meals  Goal: Promote/optimize nutrition  Outcome: Progressing  Flowsheets (Taken 2/21/2025 2217)  Promote/optimize nutrition:   Consume > 50% meals/supplements   Monitor/record intake including meals  Goal: Promote skin healing  Outcome: Progressing  Flowsheets (Taken 2/21/2025 2217)  Promote skin healing: Assess skin/pad under line(s)/device(s)     Problem: Fall/Injury  Goal: Not fall by end of shift  Outcome: Progressing  Goal: Be free from injury by end of the shift  Outcome: Progressing  Goal: Verbalize understanding of personal risk factors for fall in the hospital  Outcome: Progressing  Goal: Verbalize understanding of risk factor reduction measures to prevent injury from fall in the home  Outcome: Progressing  Goal: Use assistive devices by end of the shift  Outcome: Progressing  Goal: Pace activities to prevent fatigue by end of the shift  Outcome: Progressing

## 2025-02-22 NOTE — PROGRESS NOTES
Vancomycin Dosing by Pharmacy- FOLLOW UP    Jake Banerjee is a 69 y.o. year old male who Pharmacy has been consulted for vancomycin dosing for osteomyelitis/septic arthritis. Based on the patient's indication and renal status this patient is being dosed based on a goal AUC of 400-600.     Renal function is currently stable.    Current vancomycin dose: 1750 mg given every 24 hours    Estimated vancomycin AUC on current dose: 371 mg/L.hr     Visit Vitals  /63 (BP Location: Right arm, Patient Position: Lying)   Pulse 82   Temp 37.5 °C (99.5 °F) (Temporal)   Resp 16        Lab Results   Component Value Date    CREATININE 0.70 2025    CREATININE 0.68 2025    CREATININE 0.68 2025    CREATININE 0.73 2025        Patient weight is as follows:   Vitals:    25 0841   Weight: 70.3 kg (155 lb)       Cultures:  No results found for the encounter in last 14 days.       I/O last 3 completed shifts:  In: 900 (12.8 mL/kg) [P.O.:900]  Out: 1350 (19.2 mL/kg) [Urine:1350 (0.5 mL/kg/hr)]  Weight: 70.3 kg   I/O during current shift:  No intake/output data recorded.    Temp (24hrs), Av.2 °C (98.9 °F), Min:36.3 °C (97.3 °F), Max:37.8 °C (100 °F)      Assessment/Plan    Below goal AUC. Orders placed for new vancomcyin regimen of 1250 every 12 hours to begin at 0800.     This dosing regimen is predicted by [employer] to result in the following pharmacokinetic parameters:  <<<<<[employer] DATA>>>>>  Loading dose: N/A  Regimen: 1250 mg IV every 12 hours.  Start time: 17:48 on 2025  Exposure target: AUC24 (range)400-600 mg/L.hr   CEF04-90: 497 mg/L.hr  AUC24,ss: 528 mg/L.hr  Probability of AUC24 > 400: 98 %  Ctrough,ss: 14.4 mg/L  Probability of Ctrough,ss > 20: 4 %    The next level will be obtained on  at 0500. May be obtained sooner if clinically indicated.   Will continue to monitor renal function daily while on vancomycin and order serum creatinine at least every 48 hours if not already  ordered.  Follow for continued vancomycin needs, clinical response, and signs/symptoms of toxicity.       LISA Bejarano, PharmD

## 2025-02-23 VITALS
DIASTOLIC BLOOD PRESSURE: 66 MMHG | RESPIRATION RATE: 16 BRPM | HEIGHT: 69 IN | HEART RATE: 73 BPM | SYSTOLIC BLOOD PRESSURE: 120 MMHG | TEMPERATURE: 98.1 F | OXYGEN SATURATION: 95 % | BODY MASS INDEX: 22.96 KG/M2 | WEIGHT: 155 LBS

## 2025-02-23 LAB
ALBUMIN SERPL BCP-MCNC: 2.8 G/DL (ref 3.4–5)
ALP SERPL-CCNC: 115 U/L (ref 33–136)
ALT SERPL W P-5'-P-CCNC: 38 U/L (ref 10–52)
ANION GAP SERPL CALCULATED.3IONS-SCNC: 8 MMOL/L (ref 10–20)
AST SERPL W P-5'-P-CCNC: 79 U/L (ref 9–39)
BACTERIA BLD CULT: NORMAL
BACTERIA BLD CULT: NORMAL
BACTERIA FLD CULT: NORMAL
BASOPHILS # BLD AUTO: 0.02 X10*3/UL (ref 0–0.1)
BASOPHILS NFR BLD AUTO: 0.4 %
BILIRUB SERPL-MCNC: 0.8 MG/DL (ref 0–1.2)
BUN SERPL-MCNC: 10 MG/DL (ref 6–23)
CALCIUM SERPL-MCNC: 8.3 MG/DL (ref 8.6–10.3)
CHLORIDE SERPL-SCNC: 105 MMOL/L (ref 98–107)
CO2 SERPL-SCNC: 26 MMOL/L (ref 21–32)
CREAT SERPL-MCNC: 0.66 MG/DL (ref 0.5–1.3)
EGFRCR SERPLBLD CKD-EPI 2021: >90 ML/MIN/1.73M*2
EOSINOPHIL # BLD AUTO: 0.13 X10*3/UL (ref 0–0.7)
EOSINOPHIL NFR BLD AUTO: 2.7 %
ERYTHROCYTE [DISTWIDTH] IN BLOOD BY AUTOMATED COUNT: 14.3 % (ref 11.5–14.5)
GLUCOSE SERPL-MCNC: 148 MG/DL (ref 74–99)
GRAM STN SPEC: NORMAL
GRAM STN SPEC: NORMAL
HCT VFR BLD AUTO: 27 % (ref 41–52)
HGB BLD-MCNC: 9 G/DL (ref 13.5–17.5)
IMM GRANULOCYTES # BLD AUTO: 0.02 X10*3/UL (ref 0–0.7)
IMM GRANULOCYTES NFR BLD AUTO: 0.4 % (ref 0–0.9)
LYMPHOCYTES # BLD AUTO: 0.55 X10*3/UL (ref 1.2–4.8)
LYMPHOCYTES NFR BLD AUTO: 11.4 %
MCH RBC QN AUTO: 36 PG (ref 26–34)
MCHC RBC AUTO-ENTMCNC: 33.3 G/DL (ref 32–36)
MCV RBC AUTO: 108 FL (ref 80–100)
MONOCYTES # BLD AUTO: 0.7 X10*3/UL (ref 0.1–1)
MONOCYTES NFR BLD AUTO: 14.5 %
NEUTROPHILS # BLD AUTO: 3.41 X10*3/UL (ref 1.2–7.7)
NEUTROPHILS NFR BLD AUTO: 70.6 %
NRBC BLD-RTO: 0 /100 WBCS (ref 0–0)
PLATELET # BLD AUTO: 137 X10*3/UL (ref 150–450)
POTASSIUM SERPL-SCNC: 3.7 MMOL/L (ref 3.5–5.3)
PROT SERPL-MCNC: 6 G/DL (ref 6.4–8.2)
RBC # BLD AUTO: 2.5 X10*6/UL (ref 4.5–5.9)
SODIUM SERPL-SCNC: 135 MMOL/L (ref 136–145)
VANCOMYCIN SERPL-MCNC: 13.5 UG/ML (ref 5–20)
WBC # BLD AUTO: 4.8 X10*3/UL (ref 4.4–11.3)

## 2025-02-23 PROCEDURE — 85025 COMPLETE CBC W/AUTO DIFF WBC: CPT | Performed by: INTERNAL MEDICINE

## 2025-02-23 PROCEDURE — 99232 SBSQ HOSP IP/OBS MODERATE 35: CPT | Performed by: NURSE PRACTITIONER

## 2025-02-23 PROCEDURE — 80053 COMPREHEN METABOLIC PANEL: CPT | Performed by: INTERNAL MEDICINE

## 2025-02-23 PROCEDURE — 2500000001 HC RX 250 WO HCPCS SELF ADMINISTERED DRUGS (ALT 637 FOR MEDICARE OP): Performed by: INTERNAL MEDICINE

## 2025-02-23 PROCEDURE — 80202 ASSAY OF VANCOMYCIN: CPT

## 2025-02-23 PROCEDURE — 1200000002 HC GENERAL ROOM WITH TELEMETRY DAILY

## 2025-02-23 PROCEDURE — 36415 COLL VENOUS BLD VENIPUNCTURE: CPT | Performed by: INTERNAL MEDICINE

## 2025-02-23 PROCEDURE — 2500000004 HC RX 250 GENERAL PHARMACY W/ HCPCS (ALT 636 FOR OP/ED): Performed by: INTERNAL MEDICINE

## 2025-02-23 RX ADMIN — FOLIC ACID 1 MG: 1 TABLET ORAL at 09:36

## 2025-02-23 RX ADMIN — Medication 1 TABLET: at 09:36

## 2025-02-23 RX ADMIN — COLCHICINE 0.6 MG: 0.6 TABLET, FILM COATED ORAL at 20:54

## 2025-02-23 RX ADMIN — ENOXAPARIN SODIUM 40 MG: 40 INJECTION SUBCUTANEOUS at 15:23

## 2025-02-23 RX ADMIN — COLCHICINE 0.6 MG: 0.6 TABLET, FILM COATED ORAL at 09:36

## 2025-02-23 RX ADMIN — AMPICILLIN SODIUM AND SULBACTAM SODIUM 3 G: 2; 1 INJECTION, POWDER, FOR SOLUTION INTRAMUSCULAR; INTRAVENOUS at 04:23

## 2025-02-23 RX ADMIN — AMPICILLIN SODIUM AND SULBACTAM SODIUM 3 G: 2; 1 INJECTION, POWDER, FOR SOLUTION INTRAMUSCULAR; INTRAVENOUS at 15:23

## 2025-02-23 RX ADMIN — AMPICILLIN SODIUM AND SULBACTAM SODIUM 3 G: 2; 1 INJECTION, POWDER, FOR SOLUTION INTRAMUSCULAR; INTRAVENOUS at 09:35

## 2025-02-23 RX ADMIN — PREDNISONE 40 MG: 20 TABLET ORAL at 09:36

## 2025-02-23 RX ADMIN — ACETAMINOPHEN 650 MG: 325 TABLET, FILM COATED ORAL at 20:53

## 2025-02-23 RX ADMIN — Medication 100 MG: at 09:36

## 2025-02-23 RX ADMIN — PANTOPRAZOLE SODIUM 40 MG: 40 TABLET, DELAYED RELEASE ORAL at 05:42

## 2025-02-23 RX ADMIN — AMPICILLIN SODIUM AND SULBACTAM SODIUM 3 G: 2; 1 INJECTION, POWDER, FOR SOLUTION INTRAMUSCULAR; INTRAVENOUS at 21:00

## 2025-02-23 ASSESSMENT — COGNITIVE AND FUNCTIONAL STATUS - GENERAL
EATING MEALS: A LITTLE
MOBILITY SCORE: 18
DRESSING REGULAR LOWER BODY CLOTHING: A LITTLE
PERSONAL GROOMING: A LITTLE
TURNING FROM BACK TO SIDE WHILE IN FLAT BAD: A LITTLE
WALKING IN HOSPITAL ROOM: A LITTLE
MOVING FROM LYING ON BACK TO SITTING ON SIDE OF FLAT BED WITH BEDRAILS: A LITTLE
STANDING UP FROM CHAIR USING ARMS: A LITTLE
MOVING TO AND FROM BED TO CHAIR: A LITTLE
TOILETING: A LITTLE
HELP NEEDED FOR BATHING: A LITTLE
DAILY ACTIVITIY SCORE: 18
DRESSING REGULAR UPPER BODY CLOTHING: A LITTLE
CLIMB 3 TO 5 STEPS WITH RAILING: A LITTLE

## 2025-02-23 ASSESSMENT — PAIN SCALES - GENERAL
PAINLEVEL_OUTOF10: 3
PAINLEVEL_OUTOF10: 0 - NO PAIN
PAINLEVEL_OUTOF10: 0 - NO PAIN

## 2025-02-23 ASSESSMENT — PAIN DESCRIPTION - ORIENTATION: ORIENTATION: RIGHT;LEFT

## 2025-02-23 ASSESSMENT — PAIN - FUNCTIONAL ASSESSMENT: PAIN_FUNCTIONAL_ASSESSMENT: 0-10

## 2025-02-23 ASSESSMENT — ENCOUNTER SYMPTOMS: JOINT SWELLING: 1

## 2025-02-23 ASSESSMENT — PAIN DESCRIPTION - LOCATION: LOCATION: KNEE

## 2025-02-23 NOTE — PROGRESS NOTES
Jake Banerjee is a 69 y.o. male on day 3 of admission presenting with Acute idiopathic gout of multiple sites.      Subjective   Patient seen and examined. Sitting up in the chair. States he still has knee pain, thinks the R needs drained. He has been getting up to the bathroom with assist. Afebrile.       Objective     Last Recorded Vitals  /68 (BP Location: Right arm, Patient Position: Lying)   Pulse 73   Temp 36.5 °C (97.7 °F) (Temporal)   Resp 18   Wt 70.3 kg (155 lb)   SpO2 95%   Intake/Output last 3 Shifts:    Intake/Output Summary (Last 24 hours) at 2/23/2025 1021  Last data filed at 2/23/2025 0338  Gross per 24 hour   Intake 1150 ml   Output 600 ml   Net 550 ml       Admission Weight  Weight: 70.3 kg (155 lb) (02/18/25 0841)    Daily Weight  02/18/25 : 70.3 kg (155 lb)    Image Results    No new imaging to review.     Physical Exam    Relevant Results    General: Alert and oriented x3, pleasant, in no acute distress.   Cardiac: Regular rate and rhythm, S1/S2 , no murmur.   Pulmonary: Clear to auscultation on room air.   Abdomen: Soft, round, nontender. BS +x4.   Extremities: No edema. LLE with some faint erythema to the L calf area which is minimal. L knee with minimal swelling, bandaid in place. R knee without swelling.   Skin: No rashes or lesions.     Scheduled medications  ampicillin-sulbactam, 3 g, intravenous, q6h  colchicine, 0.6 mg, oral, BID  enoxaparin, 40 mg, subcutaneous, q24h  folic acid, 1 mg, oral, Daily  multivitamin with minerals, 1 tablet, oral, Daily  pantoprazole, 40 mg, oral, Daily before breakfast   Or  pantoprazole, 40 mg, intravenous, Daily before breakfast  polyethylene glycol, 17 g, oral, Daily  predniSONE, 40 mg, oral, Daily  thiamine, 100 mg, oral, Daily      Continuous medications     PRN medications  PRN medications: acetaminophen, ibuprofen, oxyCODONE     Results for orders placed or performed during the hospital encounter of 02/18/25 (from the past 24 hours)    Vancomycin   Result Value Ref Range    Vancomycin 13.5 5.0 - 20.0 ug/mL   CBC and Auto Differential   Result Value Ref Range    WBC 4.8 4.4 - 11.3 x10*3/uL    nRBC 0.0 0.0 - 0.0 /100 WBCs    RBC 2.50 (L) 4.50 - 5.90 x10*6/uL    Hemoglobin 9.0 (L) 13.5 - 17.5 g/dL    Hematocrit 27.0 (L) 41.0 - 52.0 %     (H) 80 - 100 fL    MCH 36.0 (H) 26.0 - 34.0 pg    MCHC 33.3 32.0 - 36.0 g/dL    RDW 14.3 11.5 - 14.5 %    Platelets 137 (L) 150 - 450 x10*3/uL    Neutrophils % 70.6 40.0 - 80.0 %    Immature Granulocytes %, Automated 0.4 0.0 - 0.9 %    Lymphocytes % 11.4 13.0 - 44.0 %    Monocytes % 14.5 2.0 - 10.0 %    Eosinophils % 2.7 0.0 - 6.0 %    Basophils % 0.4 0.0 - 2.0 %    Neutrophils Absolute 3.41 1.20 - 7.70 x10*3/uL    Immature Granulocytes Absolute, Automated 0.02 0.00 - 0.70 x10*3/uL    Lymphocytes Absolute 0.55 (L) 1.20 - 4.80 x10*3/uL    Monocytes Absolute 0.70 0.10 - 1.00 x10*3/uL    Eosinophils Absolute 0.13 0.00 - 0.70 x10*3/uL    Basophils Absolute 0.02 0.00 - 0.10 x10*3/uL   Comprehensive Metabolic Panel   Result Value Ref Range    Glucose 148 (H) 74 - 99 mg/dL    Sodium 135 (L) 136 - 145 mmol/L    Potassium 3.7 3.5 - 5.3 mmol/L    Chloride 105 98 - 107 mmol/L    Bicarbonate 26 21 - 32 mmol/L    Anion Gap 8 (L) 10 - 20 mmol/L    Urea Nitrogen 10 6 - 23 mg/dL    Creatinine 0.66 0.50 - 1.30 mg/dL    eGFR >90 >60 mL/min/1.73m*2    Calcium 8.3 (L) 8.6 - 10.3 mg/dL    Albumin 2.8 (L) 3.4 - 5.0 g/dL    Alkaline Phosphatase 115 33 - 136 U/L    Total Protein 6.0 (L) 6.4 - 8.2 g/dL    AST 79 (H) 9 - 39 U/L    Bilirubin, Total 0.8 0.0 - 1.2 mg/dL    ALT 38 10 - 52 U/L             Assessment/Plan      Acute Idiopathic Gout of Multiple Sites  -Ortho signed off.   -S/P L knee aspiration, crystals seen. Most consistent with gout.   -Fluid culture from knee aspirate pending, so far no organisms.   -Continue colchicine, continue prednisone.   -Pain control.    Cellulitis LLE  -ID follows.   -Currently on Unasyn. Vanco  was stopped.   -Blood cultures final with no growth.   -Plan to de-escalate to augmentin.   -Site is improved.     Ambulatory Dysfunction  -PT/OT evals, pending SNF.   -Ortho spine did review his spine imaging, he has lumbar spinal stenosis which can be worked up outpatient on an elective basis. There were no acute findings seen on the scans.   -OOB with assist only.   -PT/OT, continue to mobilize.     ETOH Abuse  -No signs of withdrawal.     Hyperbilirubinemia  -US abdomen shows hepatomegaly and probable fatty liver. Unremarkable gallbladder with no biliary dilation.   -Resolved.     Anemia  -Seems chronic.   -H&H stable, monitor.   -Hemolysis ruled out.     Plan   ID follows. Ortho signed off.   Ortho spine reviewed spine imaging, no acute needs, can follow up outpatient for his lumbar stenosis.   Continue prednisone and colchicine for his gout.   Continue IV abx, de-escalate per ID.   PT/OT, mobilize. Plan for SNF upon DC. [facility] pending precert. Patient states he may prefer home with [facility], will see how me moves.         Jerica Lieberman, TOMER-CNP

## 2025-02-23 NOTE — CARE PLAN
The patient's goals for the shift include      The clinical goals for the shift include safety, pain management, ambulation      Problem: Skin  Goal: Decreased wound size/increased tissue granulation at next dressing change  Outcome: Progressing  Goal: Participates in plan/prevention/treatment measures  Outcome: Progressing  Goal: Prevent/manage excess moisture  Outcome: Progressing  Goal: Prevent/minimize sheer/friction injuries  Outcome: Progressing  Goal: Promote/optimize nutrition  Outcome: Progressing  Goal: Promote skin healing  Outcome: Progressing     Problem: Fall/Injury  Goal: Not fall by end of shift  Outcome: Progressing  Goal: Be free from injury by end of the shift  Outcome: Progressing  Goal: Verbalize understanding of personal risk factors for fall in the hospital  Outcome: Progressing  Goal: Verbalize understanding of risk factor reduction measures to prevent injury from fall in the home  Outcome: Progressing  Goal: Use assistive devices by end of the shift  Outcome: Progressing  Goal: Pace activities to prevent fatigue by end of the shift  Outcome: Progressing     Problem: Pain  Goal: Takes deep breaths with improved pain control throughout the shift  Outcome: Progressing  Goal: Turns in bed with improved pain control throughout the shift  Outcome: Progressing  Goal: Walks with improved pain control throughout the shift  Outcome: Progressing  Goal: Performs ADL's with improved pain control throughout shift  Outcome: Progressing  Goal: Participates in PT with improved pain control throughout the shift  Outcome: Progressing  Goal: Free from opioid side effects throughout the shift  Outcome: Progressing  Goal: Free from acute confusion related to pain meds throughout the shift  Outcome: Progressing

## 2025-02-23 NOTE — CARE PLAN
The patient's goals for the shift include      The clinical goals for the shift include safety, pain management, ambulation      Problem: Skin  Goal: Decreased wound size/increased tissue granulation at next dressing change  Outcome: Progressing  Goal: Participates in plan/prevention/treatment measures  Outcome: Progressing  Goal: Prevent/manage excess moisture  Outcome: Progressing  Goal: Prevent/minimize sheer/friction injuries  Outcome: Progressing  Goal: Promote/optimize nutrition  Outcome: Progressing  Goal: Promote skin healing  Outcome: Progressing     Problem: Pain  Goal: Takes deep breaths with improved pain control throughout the shift  Outcome: Progressing  Goal: Turns in bed with improved pain control throughout the shift  Outcome: Progressing  Goal: Walks with improved pain control throughout the shift  Outcome: Progressing  Goal: Performs ADL's with improved pain control throughout shift  Outcome: Progressing  Goal: Participates in PT with improved pain control throughout the shift  Outcome: Progressing  Goal: Free from opioid side effects throughout the shift  Outcome: Progressing  Goal: Free from acute confusion related to pain meds throughout the shift  Outcome: Progressing     Problem: Fall/Injury  Goal: Not fall by end of shift  Outcome: Progressing  Goal: Be free from injury by end of the shift  Outcome: Progressing  Goal: Verbalize understanding of personal risk factors for fall in the hospital  Outcome: Progressing  Goal: Verbalize understanding of risk factor reduction measures to prevent injury from fall in the home  Outcome: Progressing  Goal: Use assistive devices by end of the shift  Outcome: Progressing  Goal: Pace activities to prevent fatigue by end of the shift  Outcome: Progressing

## 2025-02-24 ENCOUNTER — PHARMACY VISIT (OUTPATIENT)
Dept: PHARMACY | Facility: CLINIC | Age: 70
End: 2025-02-24

## 2025-02-24 ENCOUNTER — HOME HEALTH ADMISSION (OUTPATIENT)
Dept: HOME HEALTH SERVICES | Facility: HOME HEALTH | Age: 70
End: 2025-02-24
Payer: MEDICAID

## 2025-02-24 ENCOUNTER — DOCUMENTATION (OUTPATIENT)
Dept: HOME HEALTH SERVICES | Facility: HOME HEALTH | Age: 70
End: 2025-02-24
Payer: MEDICAID

## 2025-02-24 VITALS
HEIGHT: 69 IN | OXYGEN SATURATION: 100 % | WEIGHT: 155 LBS | SYSTOLIC BLOOD PRESSURE: 115 MMHG | HEART RATE: 83 BPM | RESPIRATION RATE: 20 BRPM | TEMPERATURE: 98.8 F | DIASTOLIC BLOOD PRESSURE: 72 MMHG | BODY MASS INDEX: 22.96 KG/M2

## 2025-02-24 LAB
ALBUMIN SERPL BCP-MCNC: 3 G/DL (ref 3.4–5)
ALP SERPL-CCNC: 104 U/L (ref 33–136)
ALT SERPL W P-5'-P-CCNC: 53 U/L (ref 10–52)
ANION GAP SERPL CALCULATED.3IONS-SCNC: 9 MMOL/L (ref 10–20)
AST SERPL W P-5'-P-CCNC: 96 U/L (ref 9–39)
BASOPHILS # BLD AUTO: 0.03 X10*3/UL (ref 0–0.1)
BASOPHILS NFR BLD AUTO: 0.5 %
BILIRUB SERPL-MCNC: 0.8 MG/DL (ref 0–1.2)
BUN SERPL-MCNC: 10 MG/DL (ref 6–23)
CALCIUM SERPL-MCNC: 8.3 MG/DL (ref 8.6–10.3)
CHLORIDE SERPL-SCNC: 107 MMOL/L (ref 98–107)
CO2 SERPL-SCNC: 27 MMOL/L (ref 21–32)
CREAT SERPL-MCNC: 0.68 MG/DL (ref 0.5–1.3)
EGFRCR SERPLBLD CKD-EPI 2021: >90 ML/MIN/1.73M*2
EOSINOPHIL # BLD AUTO: 0.18 X10*3/UL (ref 0–0.7)
EOSINOPHIL NFR BLD AUTO: 2.9 %
ERYTHROCYTE [DISTWIDTH] IN BLOOD BY AUTOMATED COUNT: 14.6 % (ref 11.5–14.5)
GLUCOSE SERPL-MCNC: 89 MG/DL (ref 74–99)
HCT VFR BLD AUTO: 28.8 % (ref 41–52)
HGB BLD-MCNC: 9.5 G/DL (ref 13.5–17.5)
IMM GRANULOCYTES # BLD AUTO: 0.02 X10*3/UL (ref 0–0.7)
IMM GRANULOCYTES NFR BLD AUTO: 0.3 % (ref 0–0.9)
LYMPHOCYTES # BLD AUTO: 1.1 X10*3/UL (ref 1.2–4.8)
LYMPHOCYTES NFR BLD AUTO: 17.6 %
MCH RBC QN AUTO: 35.8 PG (ref 26–34)
MCHC RBC AUTO-ENTMCNC: 33 G/DL (ref 32–36)
MCV RBC AUTO: 109 FL (ref 80–100)
MONOCYTES # BLD AUTO: 0.7 X10*3/UL (ref 0.1–1)
MONOCYTES NFR BLD AUTO: 11.2 %
NEUTROPHILS # BLD AUTO: 4.23 X10*3/UL (ref 1.2–7.7)
NEUTROPHILS NFR BLD AUTO: 67.5 %
NRBC BLD-RTO: 0 /100 WBCS (ref 0–0)
PATH REVIEW-CRYSTALS: NORMAL
PLATELET # BLD AUTO: 155 X10*3/UL (ref 150–450)
POTASSIUM SERPL-SCNC: 3.5 MMOL/L (ref 3.5–5.3)
PROT SERPL-MCNC: 6.4 G/DL (ref 6.4–8.2)
RBC # BLD AUTO: 2.65 X10*6/UL (ref 4.5–5.9)
SODIUM SERPL-SCNC: 139 MMOL/L (ref 136–145)
WBC # BLD AUTO: 6.3 X10*3/UL (ref 4.4–11.3)

## 2025-02-24 PROCEDURE — 85025 COMPLETE CBC W/AUTO DIFF WBC: CPT | Performed by: INTERNAL MEDICINE

## 2025-02-24 PROCEDURE — 97116 GAIT TRAINING THERAPY: CPT | Mod: GP,CQ

## 2025-02-24 PROCEDURE — 2500000004 HC RX 250 GENERAL PHARMACY W/ HCPCS (ALT 636 FOR OP/ED): Performed by: INTERNAL MEDICINE

## 2025-02-24 PROCEDURE — 36415 COLL VENOUS BLD VENIPUNCTURE: CPT | Performed by: INTERNAL MEDICINE

## 2025-02-24 PROCEDURE — 2500000001 HC RX 250 WO HCPCS SELF ADMINISTERED DRUGS (ALT 637 FOR MEDICARE OP): Performed by: INTERNAL MEDICINE

## 2025-02-24 PROCEDURE — 80053 COMPREHEN METABOLIC PANEL: CPT | Performed by: INTERNAL MEDICINE

## 2025-02-24 PROCEDURE — 97110 THERAPEUTIC EXERCISES: CPT | Mod: GP,CQ

## 2025-02-24 PROCEDURE — RXMED WILLOW AMBULATORY MEDICATION CHARGE

## 2025-02-24 PROCEDURE — 99239 HOSP IP/OBS DSCHRG MGMT >30: CPT | Performed by: NURSE PRACTITIONER

## 2025-02-24 PROCEDURE — 97535 SELF CARE MNGMENT TRAINING: CPT | Mod: GO,CO

## 2025-02-24 RX ORDER — OXYCODONE HYDROCHLORIDE 5 MG/1
5 TABLET ORAL EVERY 8 HOURS PRN
Qty: 9 TABLET | Refills: 0 | Status: SHIPPED | OUTPATIENT
Start: 2025-02-24

## 2025-02-24 RX ORDER — COLCHICINE 0.6 MG/1
0.6 TABLET ORAL 2 TIMES DAILY
Qty: 60 TABLET | Refills: 0 | Status: SHIPPED | OUTPATIENT
Start: 2025-02-24

## 2025-02-24 RX ORDER — AMOXICILLIN AND CLAVULANATE POTASSIUM 875; 125 MG/1; MG/1
1 TABLET, FILM COATED ORAL 2 TIMES DAILY
Qty: 28 TABLET | Refills: 0 | Status: SHIPPED | OUTPATIENT
Start: 2025-02-24 | End: 2025-03-10

## 2025-02-24 RX ORDER — PREDNISONE 10 MG/1
TABLET ORAL
Qty: 18 TABLET | Refills: 0 | Status: SHIPPED | OUTPATIENT
Start: 2025-02-25 | End: 2025-03-06

## 2025-02-24 RX ADMIN — PANTOPRAZOLE SODIUM 40 MG: 40 TABLET, DELAYED RELEASE ORAL at 06:16

## 2025-02-24 RX ADMIN — Medication 100 MG: at 09:23

## 2025-02-24 RX ADMIN — AMPICILLIN SODIUM AND SULBACTAM SODIUM 3 G: 2; 1 INJECTION, POWDER, FOR SOLUTION INTRAMUSCULAR; INTRAVENOUS at 04:15

## 2025-02-24 RX ADMIN — COLCHICINE 0.6 MG: 0.6 TABLET, FILM COATED ORAL at 09:22

## 2025-02-24 RX ADMIN — AMPICILLIN SODIUM AND SULBACTAM SODIUM 3 G: 2; 1 INJECTION, POWDER, FOR SOLUTION INTRAMUSCULAR; INTRAVENOUS at 09:23

## 2025-02-24 RX ADMIN — Medication 1 TABLET: at 09:22

## 2025-02-24 RX ADMIN — FOLIC ACID 1 MG: 1 TABLET ORAL at 09:22

## 2025-02-24 RX ADMIN — PREDNISONE 40 MG: 20 TABLET ORAL at 09:22

## 2025-02-24 ASSESSMENT — PAIN SCALES - GENERAL
PAINLEVEL_OUTOF10: 8
PAINLEVEL_OUTOF10: 0 - NO PAIN
PAINLEVEL_OUTOF10: 0 - NO PAIN

## 2025-02-24 ASSESSMENT — COGNITIVE AND FUNCTIONAL STATUS - GENERAL
MOVING FROM LYING ON BACK TO SITTING ON SIDE OF FLAT BED WITH BEDRAILS: A LITTLE
DRESSING REGULAR LOWER BODY CLOTHING: A LITTLE
WALKING IN HOSPITAL ROOM: A LOT
MOVING TO AND FROM BED TO CHAIR: A LITTLE
TOILETING: A LITTLE
MOBILITY SCORE: 15
HELP NEEDED FOR BATHING: A LITTLE
CLIMB 3 TO 5 STEPS WITH RAILING: TOTAL
TURNING FROM BACK TO SIDE WHILE IN FLAT BAD: A LITTLE
STANDING UP FROM CHAIR USING ARMS: A LITTLE
DRESSING REGULAR UPPER BODY CLOTHING: A LITTLE
DAILY ACTIVITIY SCORE: 20

## 2025-02-24 ASSESSMENT — ACTIVITIES OF DAILY LIVING (ADL)
BATHING_WHERE_ASSESSED: STANDING SINKSIDE
BATHING_COMMENTS: PERI AREA
HOME_MANAGEMENT_TIME_ENTRY: 45
BATHING_LEVEL_OF_ASSISTANCE: CLOSE SUPERVISION;SETUP

## 2025-02-24 ASSESSMENT — PAIN - FUNCTIONAL ASSESSMENT
PAIN_FUNCTIONAL_ASSESSMENT: 0-10
PAIN_FUNCTIONAL_ASSESSMENT: 0-10

## 2025-02-24 NOTE — PROGRESS NOTES
02/24/25 1354   Discharge Planning   Expected Discharge Disposition Home H   Does the patient need discharge transport arranged? Yes   Has discharge transport been arranged? No   What day is the transport expected? 02/24/25     Talked with patient today who was declining working stairs with therapy.  Also stated he didn't want to go to SNF.   Explained we needed to see if he can do stairs, if so, can go home.  Patient will need a ride home.   Wants scripts filled here.  Agreeable to [facility].  Internal referral to be placed.    SNF notified patient going home.    [facility] referral entered -  SOC -Wednesday

## 2025-02-24 NOTE — PROGRESS NOTES
Jake Banerjee is a 69 y.o. male on day 4 of admission presenting with Acute idiopathic gout of multiple sites.    Subjective   Interval History:   Afebrile, no chills  Interval decrease in left knee swelling  No chest pain or shortness of breath.  No nausea vomiting or diarrhea      Objective   Range of Vitals (last 24 hours)  Heart Rate:  [64-83]   Temp:  [36 °C (96.8 °F)-37.1 °C (98.8 °F)]   Resp:  [16-20]   BP: (109-131)/(65-82)   SpO2:  [95 %-100 %]   Daily Weight  02/18/25 : 70.3 kg (155 lb)    Body mass index is 22.89 kg/m².    Physical Exam  Constitutional:       Appearance: Normal appearance.   HENT:      Head: Normocephalic and atraumatic.      Nose: Nose normal.      Mouth/Throat:      Mouth: Mucous membranes are moist.      Pharynx: Oropharynx is clear.   Eyes:      General: No scleral icterus.  Cardiovascular:      Rate and Rhythm: Normal rate and regular rhythm.   Pulmonary:      Effort: Pulmonary effort is normal.      Breath sounds: Normal breath sounds.   Abdominal:      General: Bowel sounds are normal.      Palpations: Abdomen is soft.   Musculoskeletal:         General: Normal range of motion.      Cervical back: Normal range of motion and neck supple.      Comments: Left knee swelling  Right hallux swelling, mild erythema    Skin:     Comments: Resolved Left leg erythema, calor  Left hallux black discoloration under nail bed   Neurological:      General: No focal deficit present.      Mental Status: He is alert.   Psychiatric:         Mood and Affect: Mood normal.         Behavior: Behavior normal.     Antibiotics  amoxicillin-pot clavulanate - 875-125 mg  ampicillin-sulbactam - 3 gram/100 mL    Relevant Results  Labs  Results from last 72 hours   Lab Units 02/24/25  0642 02/23/25  0436 02/22/25  0427   WBC AUTO x10*3/uL 6.3 4.8 4.3*   HEMOGLOBIN g/dL 9.5* 9.0* 8.8*   HEMATOCRIT % 28.8* 27.0* 26.6*   PLATELETS AUTO x10*3/uL 155 137* 131*   NEUTROS PCT AUTO % 67.5 70.6 50.5   LYMPHS PCT AUTO % 17.6  11.4 22.2   MONOS PCT AUTO % 11.2 14.5 17.6   EOS PCT AUTO % 2.9 2.7 8.3     Results from last 72 hours   Lab Units 02/24/25  0641 02/23/25 0436 02/22/25 0427   SODIUM mmol/L 139 135* 136   POTASSIUM mmol/L 3.5 3.7 3.5   CHLORIDE mmol/L 107 105 105   CO2 mmol/L 27 26 30   BUN mg/dL 10 10 12   CREATININE mg/dL 0.68 0.66 0.70   GLUCOSE mg/dL 89 148* 90   CALCIUM mg/dL 8.3* 8.3* 8.1*   ANION GAP mmol/L 9* 8* <7*   EGFR mL/min/1.73m*2 >90 >90 >90     Results from last 72 hours   Lab Units 02/24/25 0641 02/23/25 0436 02/22/25 0427   ALK PHOS U/L 104 115 94   BILIRUBIN TOTAL mg/dL 0.8 0.8 0.9   PROTEIN TOTAL g/dL 6.4 6.0* 6.0*   ALT U/L 53* 38 30   AST U/L 96* 79* 61*   ALBUMIN g/dL 3.0* 2.8* 2.7*     Estimated Creatinine Clearance: 104.8 mL/min (by C-G formula based on SCr of 0.68 mg/dL).  C-Reactive Protein   Date Value Ref Range Status   02/18/2025 10.91 (H) <1.00 mg/dL Final     CRP   Date Value Ref Range Status   09/06/2023 6.4 (H) 0 - 2.0 MG/DL Final     Comment:     Performed at [place] [id number]     Microbiology  Reviewed-blood cultures pending, synovial fluid culture pending  Imaging  MR knee left wo IV contrast    Result Date: 2/21/2025  Interpreted By:  Josiah Zepeda and Lawrence Austen STUDY: MRI of the  left knee without IV contrast;  2/20/2025 9:46 pm   INDICATION: Signs/Symptoms:Swelling.     COMPARISON: Radiographs 02/18/2025.   ACCESSION NUMBER(S): [id number]   ORDERING CLINICIAN: DEVANG ROMERO   TECHNIQUE: MR imaging of the  left knee was obtained  without IV contrast.   FINDINGS: LIGAMENTS AND TENDONS: Mucoid degeneration of the ACL. The posterior cruciate ligaments are intact. The medial collateral ligament is intact. The lateral collateral ligament, the biceps femoris tendon, the popliteus tendon and the iliotibial band are intact. The quadriceps tendon and the patellar tendon are intact.   MENISCI: The medial meniscus is intact and without evidence of tear.  The lateral meniscus is intact and without evidence of tear.   JOINTS: The articular cartilage of the medial femoral condyle and medial tibial plateau is intact and without evidence of full thickness defect. The articular cartilage of the lateral femoral condyle and lateral tibial plateau is intact and without evidence of full thickness defect. Small fissure and moderate chondral loss at the medial patellar facet cartilage. Large joint effusion.   OSSEOUS STRUCTURES: No focal marrow replacing lesions are identified. There is no fracture.   SOFT TISSUES: There is no muscle atrophy or tear. The common peroneal nerve is intact.       1. No acute osseous or soft tissue injury. 2. Large joint effusion. 3. Moderate chondral loss with chondral fissuring at the medial patellar facet.     I personally reviewed the images/study and I agree with the findings as stated. This study was interpreted at [place], [place].   MACRO: None   Signed by: Josiah Zepeda 2/21/2025 9:56 AM Dictation workstation:   [id number]    MR lumbar spine wo IV contrast    Result Date: 2/21/2025  Interpreted By:  Breanna Dhillon, STUDY: MR LUMBAR SPINE WO IV CONTRAST; 2/20/2025 9:00 pm   INDICATION: Signs/Symptoms:Lower extremity weakness.   COMPARISON: None.   ACCESSION NUMBER(S): [id number]   ORDERING CLINICIAN: DEVANG ROMERO   TECHNIQUE: Sagittal and axial T1 and T2 weighted images of the lumbar spine were acquired. Sagittal STIR imaging was also performed   FINDINGS: Motion limited exam. There is mild retrolisthesis of L5 over S1 and minimal retrolisthesis of T12 over L1. The vertebral bodies demonstrate expected height. The marrow signal is heterogenous on T1 weighted images, a nonspecific finding. This may reflect sequela of osteoporosis/osteopenia, marrow hyperplasia secondary to chronic anemia or other marrow abnormalities.   There is desiccation of L5-S1 intervertebral disc with loss of  disc height. There is desiccation of remainder of the intervertebral discs as well.   The lower thoracic cord is unremarkable. The conus terminates at L1-L2.   At L5-S1 there is circumferential disc bulge with bilateral facet and ligamentum flavum hypertrophy. There is narrowing of bilateral lateral recesses without overall central canal stenosis. There is moderate bilateral neural foraminal narrowing, left greater than right. Endplate osteophytic spurring is also noted.   At L4-L5 there is circumferential disc bulge with bilateral facet and ligamentum flavum hypertrophy. There is mild bilateral facet joint effusion. There is mild-to-moderate central canal stenosis with mild-to-moderate bilateral neural foraminal narrowing.   At L3-L4 there is circumferential disc bulge with bilateral facet and ligamentum flavum hypertrophy. There are bilateral facet joint effusions. There is severe central canal stenosis with mild bilateral neural foraminal narrowing.   At L2-L3, there is posterior disc bulge with bilateral facet hypertrophy. There is no central canal stenosis or neural foraminal narrowing.   At L1-L2, there is posterior disc bulge with bilateral facet hypertrophy. No central canal stenosis or neural foraminal narrowing.   At T12-L1, no central canal stenosis or neural foraminal narrowing. Note is made of a circumferential disc bulge with a small annular fissure along the posterior margin of the disc. The anterior and posterior paraspinous soft tissues are within normal limits.       Lumbar spondylosis with varying severity of central canal and neural foraminal narrowing affecting multiple levels, most significantly causing severe central canal stenosis at L3-L4. Additionally L4-L5 and L5-S1 levels are also affected.   Signed by: Breanna Dhillon 2/21/2025 9:13 AM Dictation workstation:   [id number]    MR brain wo IV contrast    Result Date: 2/20/2025  Interpreted By:  Lucas Morales, STUDY: MR BRAIN WO IV  CONTRAST;  2/19/2025 8:28 pm   INDICATION: Signs/Symptoms:weakness, falls.   COMPARISON: None.   ACCESSION NUMBER(S): [id number]   ORDERING CLINICIAN: STELLA FONTANEZ   TECHNIQUE: The brain was studied in the sagittal axial and coronal planes utilizing diffusion, gradient echo T2 weighted FLAIR, T1 and T2 weighted images   FINDINGS: There is slight prominence of the cortical sulci and sylvian fissures. There is mild ventricular dilatation.  There are scattered and confluence foci of abnormal signal within the periventricular and subcortical white matter bilaterally.  These are compatible with minimal small vessel ischemic changes.  These nonspecific findings could also be produced by a demyelinating or post inflammatory process.  The visualized skull base paranasal sinuses and orbital structures are unremarkable. Diffusion weighted images and associated ADC maps of the brain were unremarkable.  There is no evidence of diffusion restriction to suggest the presence of acute infarction. Gradient echo T2 weighted images fail to demonstrate hemosiderin deposition or other evidence of hemorrhage.   IMPRESSION * There is no evidence of mass, cerebral infarction or hemorrhage.     MACRO: none   Signed by: Lucas Moraels 2/20/2025 9:25 AM Dictation workstation:   [id number]    MR cervical spine wo IV contrast    Result Date: 2/20/2025  Interpreted By:  Lucas Morales, STUDY: MR CERVICAL SPINE WO IV CONTRAST;  2/19/2025 8:48 pm   INDICATION: Signs/Symptoms:Hyperreflexia, leg weakness.   COMPARISON: None.   ACCESSION NUMBER(S): [id number]   ORDERING CLINICIAN: STELLA FONTANEZ   TECHNIQUE: The cervical spine was studied in the sagittal and axial planes utilizing T1 and T2 weighted images.   FINDINGS: Examination is limited due to motion artifact. The craniovertebral junction is normal. The cord is normal in size and signal. The marrow signal is normal. Serial axial images reveal the following: C2/C3 Mild uncovertebral  joint and facet hypertrophy without canal or foraminal narrowing C3/C4 Right worse than left foraminal narrowing due to facet hypertrophy and bilateral uncovertebral joint hypertrophy. No measurable central canal stenosis C4/C5 Circumferential bulging intervertebral disc. Bilateral facet hypertrophy. Flattening of the thecal sac which likely measures a proximally 7 mm in AP dimension in the midline. No measurable canal stenosis. C5/C6 Marginal osteophyte formation and circumferential bulging intervertebral disc without canal or foraminal narrowing C6/C7 Bilateral uncovertebral joint hypertrophy. Bilateral facet hypertrophy with retained synovial fluid in the right-sided facet joint. No measurable canal stenosis or foraminal narrowing C7/T1 There is normal alignment and vertebral body height. The disc space is normal. There is no evidence of canal or foraminal narrowing. There is no evidence of bulging or herniated disc.       * Motion limited examination demonstrates cervical spondylosis as described   MACRO: none   Signed by: Lucas Morales 2/20/2025 9:21 AM Dictation workstation:   [id number]    US abdomen limited liver    Result Date: 2/19/2025  Interpreted By:  Vamsi Krishna, STUDY: US ABDOMEN LIMITED LIVER;  2/19/2025 3:40 pm   INDICATION: Signs/Symptoms:High bilirubin.     COMPARISON: None.   ACCESSION NUMBER(S): [id number]   ORDERING CLINICIAN: DEVANG ROMERO   TECHNIQUE: Real-time sonographic evaluation of the right upper quadrant was performed.   FINDINGS: LIVER: The liver is diffusely echogenic in appearance with impaired acoustic penetration, consistent with diffuse fatty infiltration.  Liver is enlarged measuring 19.6 cm in sagittal dimension.   GALLBLADDER: The gallbladder is nondistended and demonstrates no evidence of gallstones, wall thickening or surrounding fluid. Sonographic Decker's sign is negative.   BILIARY TREE: Common bile duct is not dilated measuring 2 mm in diameter.    PANCREAS: The pancreas is poorly visualized due to overlying bowel gas.   RIGHT KIDNEY: Right kidney measures  10.3cm in length.  No right hydronephrosis is seen.       Hepatomegaly and probable fatty infiltration of the liver.   Unremarkable gallbladder. No biliary dilation.   Sub visualization of the pancreas due to bowel gas.   MACRO: None.   Signed by: Vamsi Krishna 2/19/2025 3:50 PM Dictation workstation:   [id number]    XR knee 4+ views bilateral    Result Date: 2/18/2025  Interpreted By:  Flavio Arceo, STUDY: XR KNEE 4+ VIEWS BILATERAL; 2/18/2025 4:14 pm   INDICATION: Signs/Symptoms:Pain and swelling both knees   COMPARISON: None.   ACCESSION NUMBER(S): [id number]   ORDERING CLINICIAN: DEVANG ROMERO   TECHNIQUE: Number of films: 8 view radiographs of the knees bilaterally.   FINDINGS: No fractures or destructive lesions are identified. The joint spaces and articular surfaces are maintained. The alignment is anatomic. The soft tissues are unremarkable. There is small right and moderate right suprapatellar joint effusion.       Small right and moderate left suprapatellar joint effusions without underlying fracture or subluxation. No radiographic evidence of acute osteomyelitis.   Signed by: Flavio Arceo 2/18/2025 4:57 PM Dictation workstation:   [id number]    XR ankle bilateral complete minimum 3 views    Result Date: 2/18/2025  Interpreted By:  Flavio Arceo, STUDY: XR ANKLE BILATERAL COMPLETE MINIMUM 3 VIEWS 2/18/2025 4:14 pm   INDICATION: Signs/Symptoms:Rule out arthritis vs infection Injury/Pain   COMPARISON: Left knee from 2017   ACCESSION NUMBER(S): [id number]   ORDERING CLINICIAN: DEVANG ROMERO   TECHNIQUE: Number of films: 6 view radiographs of the ankles bilaterally.   FINDINGS: There is no fracture, blastic or lytic bone lesion. No abnormal periosteal reaction is identified. The ankle mortise and subtalar joints are intact. The alignment is anatomic. Small calcaneal spurs  are seen bilaterally. Mild bilateral soft tissue swelling is noted, without abnormal gas collections or radiopaque foreign bodies.       Mild bilateral soft tissue swelling. No radiographic evidence of acute osteomyelitis.   Signed by: Flavio Arceo 2/18/2025 4:55 PM Dictation workstation:   [id number]    Vascular US Lower Extremity Venous Duplex Bilateral    Result Date: 2/18/2025  Interpreted By:  Vamsi Krishna, STUDY: VASC US LOWER EXTREMITY VENOUS DUPLEX BILATERAL  2/18/2025 3:55 pm   INDICATION: Swelling and pain for 5 days.   ,R60.9 Edema, unspecified   COMPARISON: 09/06/2023.   ACCESSION NUMBER(S): [id number]   ORDERING CLINICIAN: DEVANG ROMERO   TECHNIQUE: Routine ultrasound of the  bilateral lower extremity was performed with duplex Doppler (color and spectral) evaluation.  Static images were obtained for remote interpretation.   FINDINGS: THIGH VEINS: The  bilateral common femoral, femoral, popliteal, proximal medial saphenous, and deep femoral veins are patent and free of thrombus. The veins are normally compressible.  They demonstrate normal phasic flow and augmentation response.   Within the right popliteal fossa there is a small hypoechoic cystic lesion or fluid collection measuring 1.4 x 0.5 x 0.6 cm.   CALF VEINS: The paired peroneal and posterior tibial calf veins are patent.         No evidence of deep venous thrombosis of the  bilateral lower extremity.   Nonspecific small hypoechoic cystic lesion or fluid collection of the right popliteal fossa.       MACRO: None.   Signed by: Vamsi Krishna 2/18/2025 4:01 PM Dictation workstation:   [id number]    XR tibia fibula left 2 views    Result Date: 2/18/2025  Interpreted By:  Ruddy Gage, STUDY: XR TIBIA FIBULA LEFT 2 VIEWS; 2/18/2025 9:32 am   INDICATION: Signs/Symptoms:swelling, pain.   COMPARISON: None.   ACCESSION NUMBER(S): [id number]   ORDERING CLINICIAN: BIPIN DE LA GARZA   TECHNIQUE: Left tib fib two views   FINDINGS: No  fractures or destructive lesions are identified. There is a small plantar calcaneal spur incidentally noted.       No acute pathologic findings are identified.   MACRO: none   Signed by: Ruddy Gage 2/18/2025 10:08 AM Dictation workstation:   [id number]     Assessment/Plan   Left leg cellulitis, resolving  Bilateral knee and ankle swelling/pain-left knee workup remarkable for gout        IV Unasyn  Monitor temperature and WBC  Supportive care  De-escalate to Augmentin for total 14 days    Total time spent caring for the patient today was 20 minutes. This includes time spent before the visit reviewing the chart, time spent during the visit, and time spent after the visit on documentation.     Flora Jiménez, APRN-CNP

## 2025-02-24 NOTE — PROGRESS NOTES
Occupational Therapy    OT Treatment    Patient Name: Jake Banerjee  MRN: [id number]  Department: [address]  Room: [address] [place] [address]-  Today's Date: 2/24/2025  Time Calculation  Start Time: 1000  Stop Time: 1045  Time Calculation (min): 45 min        Assessment:  OT Assessment: Pt performing at supervision levels,  limited with transfers and mobility due to knee pain..  Prognosis: Good  Barriers to Discharge Home: Caregiver assistance, Physical needs  Caregiver Assistance: Patient lives alone and/or does not have reliable caregiver assistance  Physical Needs: 24hr mobility assistance needed, Intermittent ADL assistance needed, High falls risk due to function or environment  Evaluation/Treatment Tolerance: Patient limited by pain  End of Session Communication: Bedside nurse  End of Session Patient Position: Up in chair (With PT staff end of session.)  OT Assessment Results: Decreased ADL status, Decreased safe judgment during ADL, Decreased endurance, Decreased functional mobility, Decreased gross motor control, Decreased IADLs  Prognosis: Good  Evaluation/Treatment Tolerance: Patient limited by pain  Plan:  Treatment Interventions: ADL retraining, Functional transfer training, UE strengthening/ROM, Endurance training, Patient/family training, Equipment evaluation/education, Neuromuscular reeducation, Compensatory technique education  OT Frequency: 3 times per week  OT Discharge Recommendations: Moderate intensity level of continued care  Equipment Recommended upon Discharge: Wheeled walker  OT Recommended Transfer Status: Dependent  OT - OK to Discharge: Yes  Treatment Interventions: ADL retraining, Functional transfer training, UE strengthening/ROM, Endurance training, Patient/family training, Equipment evaluation/education, Neuromuscular reeducation, Compensatory technique education    Subjective   Previous Visit Info:  OT Last Visit  OT Received On: 02/24/25  General:  General  Reason for Referral: impaired mobility d/t acute  ieopathic gaout  Referred By: Dr. Jackson  Past Medical History Relevant to Rehab: gout, alcohol abuse, Left knee aspiration from October of last year demonstrated uric acid crystals  Prior to Session Communication: Bedside nurse  Patient Position Received: Up in chair, Alarm on  General Comment: Agreeable to treatment.  Precautions:  LE Weight Bearing Status: Weight Bearing as Tolerated  Medical Precautions: Fall precautions  Precautions Comment: WBAT BLE    Pain:  Pain Assessment  Pain Assessment: 0-10  0-10 (Numeric) Pain Score: 8  Pain Location: Knee  Pain Orientation: Right, Left  Pain Interventions: Distraction (no complains of pain during session)    Objective    Cognition:  Cognition  Overall Cognitive Status: Within Functional Limits  Orientation Level: Oriented X4  Cognition Comments: pt tends to perserverate on physicians and having to do stairs with PT staff.  Insight: Mild  Impulsive: Mildly  Processing Speed: Delayed     Activities of Daily Living: Grooming  Grooming Level of Assistance: Close supervision  Grooming Where Assessed: Standing sinkside  Grooming Comments: shaving face, in stance x 10 minutes, fair balance    UE Bathing  UE Bathing Level of Assistance: Setup, Close supervision  UE Bathing Where Assessed: Standing sinkside  UE Bathing Comments: sponge bathing    LE Bathing  LE Bathing Level of Assistance: Close supervision, Setup  LE Bathing Where Assessed: Standing sinkside  LE Bathing Comments: diana area    Toileting  Toileting Level of Assistance: Close supervision  Where Assessed: Toilet  Toileting Comments: hygiene only, seated     Bed Mobility/Transfers: Transfer 1  Transfer From 1: Chair with arms to  Transfer to 1: Chair without arms  Technique 1: Sit to stand, Stand to sit  Transfer Device 1: Walker  Transfer Level of Assistance 1: Contact guard  Transfers 2  Transfer From 2: Chair with arms to  Transfer to 2: Wheelchair  Technique 2: To right, To left  Transfer Device 2:  Walker  Transfer Level of Assistance 2: Contact guard  Trials/Comments 2: cues for safe hand placement    Toilet Transfers  Toilet Transfer From: Rolling walker  Toilet Transfer Type: To and from  Toilet Transfer to: Standard toilet  Toilet Transfer Technique: To right, To left  Toilet Transfers: Contact guard  Toilet Transfers Comments: additonal time, effort due to pain in knees    Functional Mobility:  Functional Mobility 1  Surface 1: Level tile  Device 1: Rolling walker  Assistance 1: Contact guard  Quality of Functional Mobility 1:  (cautious due to knee pain)  Comments 1: short household distance in room x2  Standing Balance:  Dynamic Standing Balance  Dynamic Standing-Balance Support: No upper extremity supported  Dynamic Standing-Level of Assistance: Distant supervision  Dynamic Standing-Balance: Forward lean, Reaching for objects, Reaching across midline  Dynamic Standing-Comments: ~ 30 minutes, fair balance  Outcome Measures:[facility] Daily Activity  Putting on and taking off regular lower body clothing: A little  Bathing (including washing, rinsing, drying): A little  Putting on and taking off regular upper body clothing: A little  Toileting, which includes using toilet, bedpan or urinal: A little  Taking care of personal grooming such as brushing teeth: None  Eating Meals: None  Daily Activity - Total Score: 20    Education Documentation  ADL Training, taught by STEF Becker at 2/24/2025 11:09 AM.  Learner: Patient  Readiness: Acceptance  Method: Explanation  Response: Verbalizes Understanding, Demonstrated Understanding  Comment: safe transfer training    Education Comments  No comments found.      Goals:  Encounter Problems       Encounter Problems (Active)       OT Goals       UB ADLs (Progressing)       Start:  02/19/25    Expected End:  03/19/25       Patient will complete UB ADL tasks, with set-up using AE need in order to increase patient's safety and independence with self-care tasks.           LB ADLs (Progressing)       Start:  02/19/25    Expected End:  03/19/25       Patient will complete LB ADL tasks, with stand by assist using AE need in order to increase patient's safety and independence with self-care tasks.          Functional Transfers (Progressing)       Start:  02/19/25    Expected End:  03/19/25       Patient will complete functional transfers with stand by assist using least restrictive device, in order to increase patient's safety and independence with daily tasks.          Standing Tolerance  (Progressing)       Start:  02/19/25    Expected End:  03/19/25       Patient will demonstrate the ability to stand at least >/= 1 minute in order to increase safety and independence with daily tasks.         Activity Tolerance  (Progressing)       Start:  02/19/25    Expected End:  03/19/25       Patient will demonstrate the ability to participate in functional activity at least >/= 25 minutes in order to increase patient's safety and independence with daily tasks.

## 2025-02-24 NOTE — DISCHARGE SUMMARY
Discharge Diagnosis  Acute idiopathic gout of multiple sites    Issues Requiring Follow-Up  Gout    Discharge Meds     Medication List      START taking these medications     oxyCODONE 5 mg immediate release tablet; Commonly known as: Roxicodone;   Take 1 tablet (5 mg) by mouth every 8 hours if needed for moderate pain (4   - 6).   predniSONE 10 mg tablet; Commonly known as: Deltasone; Take 3 tablets   (30 mg) by mouth once daily for 3 days, THEN 2 tablets (20 mg) once daily   for 3 days, THEN 1 tablet (10 mg) once daily for 3 days.; Start taking on:   February 25, 2025     CHANGE how you take these medications     colchicine 0.6 mg tablet; Take 1 tablet (0.6 mg) by mouth 2 times a   day.; What changed: how much to take, when to take this, reasons to take   this     STOP taking these medications     dexAMETHasone 2 mg tablet; Commonly known as: Decadron       Test Results Pending At Discharge  Pending Labs       Order Current Status    Pathologist Review-Crystals In process    Sterile Fluid Culture/Smear Preliminary result            Hospital Course   Admitted for gout of left knee and inability to ambulate. Was treated with Prednisone and empiric IV Abx for possible cellulitis. Colchicine was increased. Pt was evaluated by Ortho and left knee was aspirated. To continue Augmentin on discharge and follow-up with Ortho. Pt refusing SNF after much encouragement he still continues to refuse. To discharge home with [facility]. Medically stable for discharge.      Pertinent Physical Exam At Time of Discharge  Physical Exam  HENT:      Head: Normocephalic and atraumatic.      Nose: Nose normal.      Mouth/Throat:      Mouth: Mucous membranes are moist.      Pharynx: Oropharynx is clear.   Eyes:      Extraocular Movements: Extraocular movements intact.      Pupils: Pupils are equal, round, and reactive to light.   Cardiovascular:      Rate and Rhythm: Normal rate and regular rhythm.      Pulses: Normal pulses.   Pulmonary:       Effort: Pulmonary effort is normal.      Breath sounds: Normal breath sounds.   Abdominal:      General: Abdomen is flat. Bowel sounds are normal.      Palpations: Abdomen is soft.   Musculoskeletal:         General: Normal range of motion.   Skin:     General: Skin is warm and dry.      Capillary Refill: Capillary refill takes less than 2 seconds.   Neurological:      General: No focal deficit present.      Mental Status: He is oriented to person, place, and time.   Psychiatric:         Mood and Affect: Mood normal.         Outpatient Follow-Up  No future appointments.      Sylvie Fernandes, TOMER-CNP

## 2025-02-24 NOTE — PROGRESS NOTES
Physical Therapy    Physical Therapy Treatment    Patient Name: Jake Banerjee  MRN: [id number]  Department: [address]  Room: [address] [place] [address]-  Today's Date: 2/24/2025  Time Calculation  Start Time: 0725  Stop Time: 0758  Start Time: 1045 ( second session for stairs and gait)  Stop time 11:15  Time Calculation (min): 33 min         Assessment/Plan   PT Assessment  PT Assessment Results: Decreased strength, Decreased range of motion, Decreased endurance, Impaired balance, Decreased mobility, Decreased cognition, Impaired judgement, Decreased safety awareness, Decreased skin integrity, Pain  Rehab Prognosis: Fair  Barriers to Discharge Home: Caregiver assistance, Cognition needs, Physical needs  Caregiver Assistance: Caregiver assistance needed per identified barriers - however, level of patient's required assistance exceeds assistance available at home  Cognition Needs: Other (Comment)  Physical Needs: High falls risk due to function or environment, 24hr ADL assistance needed, 24hr mobility assistance needed  Evaluation/Treatment Tolerance: Patient limited by fatigue, Patient limited by pain (with mobility)  Medical Staff Made Aware: Yes  Strengths: Premorbid level of function  Barriers to Participation: Comorbidities  End of Session Communication: Bedside nurse  Assessment Comment: Patient able to walk 10' x 2 with antalgic gait with Min A with fatique/pain with mobility  End of Session Patient Position: Up in chair, Alarm on (Needs at reach)     PT Plan  Treatment/Interventions: Bed mobility, Transfer training, Gait training, Balance training, Neuromuscular re-education, Strengthening, Endurance training, Therapeutic exercise, Therapeutic activity  PT Plan: Ongoing PT  PT Frequency: 3 times per week  PT Discharge Recommendations: Moderate intensity level of continued care  Equipment Recommended upon Discharge: Wheeled walker  PT Recommended Transfer Status: Assist x1, Assistive device  PT - OK to Discharge: Yes      General  Visit Information:   PT  Visit  PT Received On: 02/24/25  General  Reason for Referral: impaired mobility d/t acute ieopathic gaout  Referred By: Dr. Jackson  Past Medical History Relevant to Rehab: gout, alcohol abuse, Left knee aspiration from October of last year demonstrated uric acid crystals  Prior to Session Communication: Bedside nurse  Patient Position Received: Bed, 3 rail up, Alarm on  Preferred Learning Style: verbal, visual  General Comment: Cleared by nurse to see. Patient agreeable to therapy    Subjective   Precautions:  Precautions  LE Weight Bearing Status: Weight Bearing as Tolerated  Medical Precautions: Fall precautions  Precautions Comment: WBAT BLE     Date/Time Vitals Session Patient Position Pulse Resp SpO2 BP MAP (mmHg)    02/24/25 1117 --  --  83  20  100 %  115/72  86                 Objective   Pain:  Pain Assessment  Pain Assessment: 0-10  0-10 (Numeric) Pain Score: 0 - No pain  Cognition:  Cognition  Overall Cognitive Status: Impaired  Orientation Level: Oriented X4  Cognition Comments: Repeating story of how he got here  Safety/Judgement:  (Decreased safety awareness)  Insight: Mild  Impulsive: Mildly  Processing Speed: Delayed  Coordination:  Coordination Comment: slow, effortful movements Using B UEs to move B LEs  Postural Control:  Static Sitting Balance  Static Sitting-Level of Assistance: Close supervision  Static Sitting-Comment/Number of Minutes: Sitting EOB x 2 minutes  Dynamic Standing Balance  Dynamic Standing-Balance Support: Bilateral upper extremity supported  Dynamic Standing-Level of Assistance: Minimum assistance  Dynamic Standing-Comments: Min A with gait with RW  Extremity/Trunk Assessments:    Activity Tolerance:  Activity Tolerance  Endurance: Decreased tolerance for upright activites  Activity Tolerance Comments: Limited by fatique/pain with mobilty  Treatments:  Therapeutic Exercise  Therapeutic Exercise Performed: Yes  Therapeutic Exercise Activity 1: B LE ankle  pumps ,quad/gluteal sets, heelslides, SAQs,hip abduction/adduction x 15    Bed Mobility  Bed Mobility: Yes  Bed Mobility 1  Bed Mobility 1: Supine to sitting  Level of Assistance 1: Close supervision  Bed Mobility Comments 1: Patient using B UEs to mobe B LEs to/ over the EOB with clos esupervision Increased time/ effort to complete    Ambulation/Gait Training  Ambulation/Gait Training Performed: Yes  Ambulation/Gait Training 1  Surface 1: Level tile  Device 1: Rolling walker  Assistance 1: Minimum assistance  Quality of Gait 1: Wide base of support, Inconsistent stride length, Shuffling gait, Forward flexed posture  Comments/Distance (ft) 1: Patient able to ambulate 10' x 2 with RW with Min A with WBOS, slow,small shuffling steps with flexed trunk with cues to stay in frame of RW for upright posture, Standing rest between between walks Up to chair for breakfast    Steps One rail on right up 4 steps x 2 trials with Min A with moderate cueing for proper sequencing Down 4 steps x 2 with rail on left with Min A with moderate cueing for proper sequencing Patient wanting to do his way.Not listening for proper sequencing. Increased gait 60' x 1 with RW with contact guard assist Patient reports he has wide RW at home Wanted to issue narrow RW but patient declined issuing narrow RW for home use. Wanted crutches Declined use of crutches Patient would be unsafe at this time.          Transfers  Transfer: Yes  Transfer 1  Transfer From 1: Bed to  Transfer to 1: Stand  Technique 1: Sit to stand  Transfer Device 1: Walker  Transfer Level of Assistance 1: Minimum assistance  Trials/Comments 1: STS from bed with MIn A to steady  Transfers 2  Transfer From 2: Stand to  Transfer to 2: Sit, Chair with arms  Technique 2: Stand to sit  Transfer Device 2: Walker  Transfer Level of Assistance 2: Minimum assistance  Trials/Comments 2: STS to bedside chair with Min A with cues for safe hand placement    Outcome Measures:  [facility] Basic  Mobility  Turning from your back to your side while in a flat bed without using bedrails: A little  Moving from lying on your back to sitting on the side of a flat bed without using bedrails: A little  Moving to and from bed to chair (including a wheelchair): A little  Standing up from a chair using your arms (e.g. wheelchair or bedside chair): A little  To walk in hospital room: A lot  Climbing 3-5 steps with railing: Total  Basic Mobility - Total Score: 15    Education Documentation  Precautions, taught by Mery Bautista PTA at 2/24/2025  8:20 AM.  Learner: Patient  Readiness: Acceptance  Method: Explanation  Response: Verbalizes Understanding    Body Mechanics, taught by Mery Bautista PTA at 2/24/2025  8:20 AM.  Learner: Patient  Readiness: Acceptance  Method: Explanation  Response: Verbalizes Understanding    Home Exercise Program, taught by Mery Bautista PTA at 2/24/2025  8:20 AM.  Learner: Patient  Readiness: Acceptance  Method: Explanation  Response: Verbalizes Understanding    Mobility Training, taught by Mery Bautista PTA at 2/24/2025  8:20 AM.  Learner: Patient  Readiness: Acceptance  Method: Explanation  Response: Verbalizes Understanding    Education Comments  No comments found.        OP EDUCATION:       Encounter Problems       Encounter Problems (Active)       Balance       Standing Balance (Progressing)       Start:  02/18/25    Expected End:  03/04/25       Pt will demonstrate good static standing balance to promote safe participation with out of bed activity, transfers, and mobility           Standing Balance (Progressing)       Start:  02/18/25    Expected End:  03/04/25       Pt will demonstrate good static standing balance to promote safe participation with out of bed activity, transfers, and mobility              Mobility       Ambulation (Progressing)       Start:  02/18/25    Expected End:  03/04/25       Pt will ambulate 50' modified independent assist with LRD to promote  safe home mobility           Entry Stair Negotiation (Progressing)       Start:  02/18/25    Expected End:  03/04/25       Pt will ascend/descend 3 stairs with rail(s) on L and modified independent assist to promote safe entry and exit in home environment                PT Transfers       Supine to sit (Progressing)       Start:  02/18/25    Expected End:  03/04/25       Pt will transfer supine to sitting at edge of bed with modified independent assist to promote acute care out of bed activity           Sit to stand (Progressing)       Start:  02/18/25    Expected End:  03/04/25       Pt will transfer sit to standing position with modified independent assist and walker to promote safe out of bed activity              Safety       Safe Mobility Techniques (Progressing)       Start:  02/18/25    Expected End:  03/04/25       Pt will correctly identify and demonstrate safe mobility techniques to reduce their risks for falls during their acute care stay

## 2025-02-24 NOTE — HH CARE COORDINATION
Home Care received a Referral for Physical Therapy and Occupational Therapy. We have processed the referral for a Start of Care on 2/26/25.     If you have any questions or concerns, please feel free to contact us at [phone]. Follow the prompts, enter your five digit zip code, and you will be directed to your care team on [facility].

## 2025-02-25 LAB
BACTERIA FLD CULT: NORMAL
GRAM STN SPEC: NORMAL
GRAM STN SPEC: NORMAL

## 2025-03-27 ENCOUNTER — HOSPITAL ENCOUNTER (EMERGENCY)
Facility: HOSPITAL | Age: 70
Discharge: HOME | End: 2025-03-27
Payer: MEDICAID

## 2025-03-27 VITALS
OXYGEN SATURATION: 100 % | HEART RATE: 82 BPM | SYSTOLIC BLOOD PRESSURE: 122 MMHG | RESPIRATION RATE: 16 BRPM | TEMPERATURE: 97.9 F | BODY MASS INDEX: 23.99 KG/M2 | HEIGHT: 69 IN | WEIGHT: 162 LBS | DIASTOLIC BLOOD PRESSURE: 61 MMHG

## 2025-03-27 DIAGNOSIS — M1A.0620 CHRONIC GOUT OF LEFT KNEE, UNSPECIFIED CAUSE: Primary | ICD-10-CM

## 2025-03-27 PROCEDURE — 99283 EMERGENCY DEPT VISIT LOW MDM: CPT

## 2025-03-27 PROCEDURE — 2500000001 HC RX 250 WO HCPCS SELF ADMINISTERED DRUGS (ALT 637 FOR MEDICARE OP)

## 2025-03-27 RX ORDER — COLCHICINE 0.6 MG/1
0.6 TABLET ORAL ONCE
Status: COMPLETED | OUTPATIENT
Start: 2025-03-27 | End: 2025-03-27

## 2025-03-27 RX ORDER — COLCHICINE 0.6 MG/1
0.6 TABLET ORAL DAILY
Qty: 30 TABLET | Refills: 11 | Status: SHIPPED | OUTPATIENT
Start: 2025-03-27 | End: 2026-03-27

## 2025-03-27 RX ADMIN — COLCHICINE 0.6 MG: 0.6 TABLET, FILM COATED ORAL at 18:28

## 2025-03-27 ASSESSMENT — COLUMBIA-SUICIDE SEVERITY RATING SCALE - C-SSRS
1. IN THE PAST MONTH, HAVE YOU WISHED YOU WERE DEAD OR WISHED YOU COULD GO TO SLEEP AND NOT WAKE UP?: NO
2. HAVE YOU ACTUALLY HAD ANY THOUGHTS OF KILLING YOURSELF?: NO
6. HAVE YOU EVER DONE ANYTHING, STARTED TO DO ANYTHING, OR PREPARED TO DO ANYTHING TO END YOUR LIFE?: NO

## 2025-03-27 ASSESSMENT — PAIN DESCRIPTION - PAIN TYPE: TYPE: CHRONIC PAIN

## 2025-03-27 ASSESSMENT — PAIN - FUNCTIONAL ASSESSMENT: PAIN_FUNCTIONAL_ASSESSMENT: 0-10

## 2025-03-27 ASSESSMENT — PAIN SCALES - GENERAL: PAINLEVEL_OUTOF10: 7

## 2025-03-30 NOTE — ED PROVIDER NOTES
HPI   Chief Complaint   Patient presents with    Knee Pain       HPI  Patient is a 69-year-old male who presents to ED for left knee pain.  Patient was discharged on 2/24 for acute idiopathic gout.  Patient was unable to ambulate at that time and that was why he was admitted, he was strongly encouraged to consider SNF placement, patient refused this repeatedly, after his discharge arrangements were made for follow-up with orthopedics.  In the interim patient did not follow-up with orthopedics or primary care.  Patient did not take his medication as directed either.  Patient states all he does is sit in his chair all day.  Patient presents today for a continuation of this problem.  Patient is unable to clearly define what his goals are for today.  He is ambulatory.  He does have gout.  Much effort was made to help the patient to the best of our ability however the patient continuously refused all avenues of resolution.      Patient History   Past Medical History:   Diagnosis Date    Foreign body in cornea, left eye, initial encounter 11/02/2017    Acute foreign body of left cornea     Past Surgical History:   Procedure Laterality Date    HERNIA REPAIR  10/18/2017    Hernia Repair     No family history on file.  Social History     Tobacco Use    Smoking status: Never    Smokeless tobacco: Never   Vaping Use    Vaping status: Never Used   Substance Use Topics    Alcohol use: Not Currently     Alcohol/week: 1.0 standard drink of alcohol     Types: 1 Cans of beer per week    Drug use: Not on file       Physical Exam   ED Triage Vitals [03/27/25 1748]   Temperature Heart Rate Respirations BP   36.6 °C (97.9 °F) 82 16 122/61      Pulse Ox Temp Source Heart Rate Source Patient Position   100 % Oral Monitor --      BP Location FiO2 (%)     -- --       Physical Exam  Vitals reviewed.   HENT:      Head: Normocephalic.   Eyes:      Extraocular Movements: Extraocular movements intact.   Cardiovascular:      Rate and Rhythm:  Normal rate.   Pulmonary:      Effort: Pulmonary effort is normal.   Abdominal:      General: Abdomen is flat.   Musculoskeletal:      Cervical back: Normal range of motion.      Left knee: Swelling present.   Skin:     General: Skin is warm and dry.   Neurological:      Mental Status: He is alert and oriented to person, place, and time.   Psychiatric:         Mood and Affect: Mood normal.         Behavior: Behavior normal.           ED Course & MDM   Diagnoses as of 03/30/25 1418   Chronic gout of left knee, unspecified cause                 No data recorded     [place] Coma Scale Score: 15 (03/27/25 1750 : Tatiana Lainez RN)                           Medical Decision Making  Parts of this chart have been completed using voice recognition software. Please excuse any errors of transcription.  My thought process and reason for plan has been formulated from the time that I saw the patient until the time of disposition and is not specific to one specific moment during their visit and furthermore my MDM encompasses this entire chart and not only this text box.    HPI:   A medically appropriate HPI was obtained, outlined above.    Jake Banerjee is a  69 y.o. male    Chief Complaint   Patient presents with    Knee Pain       Past Medical History:   Diagnosis Date    Foreign body in cornea, left eye, initial encounter 11/02/2017    Acute foreign body of left cornea       Past Surgical History:   Procedure Laterality Date    HERNIA REPAIR  10/18/2017    Hernia Repair       Social History     Tobacco Use    Smoking status: Never    Smokeless tobacco: Never   Vaping Use    Vaping status: Never Used   Substance Use Topics    Alcohol use: Not Currently     Alcohol/week: 1.0 standard drink of alcohol     Types: 1 Cans of beer per week       No family history on file.    Allergies   Allergen Reactions    Sulfa (Sulfonamide Antibiotics) Rash       Current Outpatient Medications   Medication Instructions    colchicine 0.6 mg, oral, 2  times daily    colchicine 0.6 mg, oral, Daily    oxyCODONE (ROXICODONE) 5 mg, oral, Every 8 hours PRN   for details    Exam:   No data found.    A medically appropriate exam performed, outlined above, given the known history and presentation.    EKG/Cardiac monitor:   If EKG was done and, it was interpreted by attending physician, see their note for ED course for more detail.    Medications given during visit:  Medications   colchicine tablet 0.6 mg (0.6 mg oral Given 3/27/25 6955)        Diagnostic/tests:  Labs Reviewed - No data to display     No orders to display          MDM Summary:  Patient appears to be noncompliant with appointments and medications.  Patient refused  consult in the ED today.  Advised patient to seek outpatient follow-up with  for housing insecurity.    We have discussed the diagnosis and risks, and we agree with discharging home to follow-up with appropriate physician as directed. We also discussed returning to the Emergency Department immediately if new or worsening symptoms occur. We have discussed the symptoms which are most concerning that necessitate immediate return. Pt symptoms have been well controlled here and the patient is safe for discharge with appropriate outpatient follow up. The patient has verbalized understanding to return to ER without delay for new or worsening pains or for any other symptoms or concerns. I utilized the discharge clinical management tool provided [employer] to help estimate risk of negative outcome for this patient.      Disposition:  ED Prescriptions       Medication Sig Dispense Start Date End Date Auth. Provider    colchicine 0.6 mg tablet Take 1 tablet (0.6 mg) by mouth once daily. 30 tablet 3/27/2025 3/27/2026 Justo Weeks PA-C              Procedure  Procedures     Justo Weeks PA-C  03/30/25 1424